# Patient Record
Sex: MALE | Race: BLACK OR AFRICAN AMERICAN | NOT HISPANIC OR LATINO | Employment: FULL TIME | URBAN - METROPOLITAN AREA
[De-identification: names, ages, dates, MRNs, and addresses within clinical notes are randomized per-mention and may not be internally consistent; named-entity substitution may affect disease eponyms.]

---

## 2018-06-17 ENCOUNTER — HOSPITAL ENCOUNTER (INPATIENT)
Facility: HOSPITAL | Age: 43
LOS: 6 days | Discharge: HOME | DRG: 247 | End: 2018-06-23
Attending: EMERGENCY MEDICINE | Admitting: INTERNAL MEDICINE
Payer: COMMERCIAL

## 2018-06-17 DIAGNOSIS — I21.02 ST ELEVATION MYOCARDIAL INFARCTION INVOLVING LEFT ANTERIOR DESCENDING (LAD) CORONARY ARTERY: ICD-10-CM

## 2018-06-17 DIAGNOSIS — I21.3 ST ELEVATION (STEMI) MYOCARDIAL INFARCTION: ICD-10-CM

## 2018-06-17 DIAGNOSIS — I21.4 NSTEMI (NON-ST ELEVATED MYOCARDIAL INFARCTION): ICD-10-CM

## 2018-06-17 DIAGNOSIS — I21.02 ST ELEVATION MYOCARDIAL INFARCTION INVOLVING LEFT ANTERIOR DESCENDING CORONARY ARTERY: ICD-10-CM

## 2018-06-17 DIAGNOSIS — I21.3 ST ELEVATION MYOCARDIAL INFARCTION (STEMI), UNSPECIFIED ARTERY: Primary | ICD-10-CM

## 2018-06-17 DIAGNOSIS — I21.3 STEMI (ST ELEVATION MYOCARDIAL INFARCTION): ICD-10-CM

## 2018-06-17 DIAGNOSIS — R07.9 CHEST PAIN: ICD-10-CM

## 2018-06-17 DIAGNOSIS — R07.89 CHEST DISCOMFORT: ICD-10-CM

## 2018-06-17 LAB
ALBUMIN SERPL BCP-MCNC: 3 G/DL
ALBUMIN SERPL BCP-MCNC: 3.4 G/DL
ALBUMIN SERPL BCP-MCNC: 3.7 G/DL
ALP SERPL-CCNC: 60 U/L
ALP SERPL-CCNC: 65 U/L
ALP SERPL-CCNC: 75 U/L
ALT SERPL W/O P-5'-P-CCNC: 21 U/L
ALT SERPL W/O P-5'-P-CCNC: 24 U/L
ALT SERPL W/O P-5'-P-CCNC: 26 U/L
ANION GAP SERPL CALC-SCNC: 12 MMOL/L
ANION GAP SERPL CALC-SCNC: 13 MMOL/L
ANION GAP SERPL CALC-SCNC: 14 MMOL/L
APTT BLDCRRT: 122.5 SEC
AST SERPL-CCNC: 46 U/L
AST SERPL-CCNC: 51 U/L
AST SERPL-CCNC: 52 U/L
BASOPHILS # BLD AUTO: 0.01 K/UL
BASOPHILS # BLD AUTO: 0.04 K/UL
BASOPHILS # BLD AUTO: 0.05 K/UL
BASOPHILS # BLD AUTO: 0.05 K/UL
BASOPHILS NFR BLD: 0.1 %
BASOPHILS NFR BLD: 0.5 %
BASOPHILS NFR BLD: 0.6 %
BASOPHILS NFR BLD: 0.6 %
BILIRUB SERPL-MCNC: 0.4 MG/DL
BILIRUB SERPL-MCNC: 0.5 MG/DL
BILIRUB SERPL-MCNC: 0.6 MG/DL
BNP SERPL-MCNC: 384 PG/ML
BUN SERPL-MCNC: 5 MG/DL
BUN SERPL-MCNC: 6 MG/DL
BUN SERPL-MCNC: 6 MG/DL
CALCIUM SERPL-MCNC: 10 MG/DL
CALCIUM SERPL-MCNC: 8.6 MG/DL
CALCIUM SERPL-MCNC: 9.4 MG/DL
CHLORIDE SERPL-SCNC: 94 MMOL/L
CHLORIDE SERPL-SCNC: 98 MMOL/L
CHLORIDE SERPL-SCNC: 99 MMOL/L
CO2 SERPL-SCNC: 22 MMOL/L
CO2 SERPL-SCNC: 23 MMOL/L
CO2 SERPL-SCNC: 25 MMOL/L
CREAT SERPL-MCNC: 0.8 MG/DL
CREAT SERPL-MCNC: 0.9 MG/DL
CREAT SERPL-MCNC: 1.2 MG/DL
D DIMER PPP IA.FEU-MCNC: 0.36 MG/L FEU
DIFFERENTIAL METHOD: ABNORMAL
EOSINOPHIL # BLD AUTO: 0.2 K/UL
EOSINOPHIL # BLD AUTO: 0.3 K/UL
EOSINOPHIL # BLD AUTO: 0.3 K/UL
EOSINOPHIL # BLD AUTO: 0.4 K/UL
EOSINOPHIL NFR BLD: 3 %
EOSINOPHIL NFR BLD: 3.3 %
EOSINOPHIL NFR BLD: 3.3 %
EOSINOPHIL NFR BLD: 4.5 %
ERYTHROCYTE [DISTWIDTH] IN BLOOD BY AUTOMATED COUNT: 12.2 %
ERYTHROCYTE [DISTWIDTH] IN BLOOD BY AUTOMATED COUNT: 12.2 %
ERYTHROCYTE [DISTWIDTH] IN BLOOD BY AUTOMATED COUNT: 12.3 %
ERYTHROCYTE [DISTWIDTH] IN BLOOD BY AUTOMATED COUNT: 12.5 %
EST. GFR  (AFRICAN AMERICAN): >60 ML/MIN/1.73 M^2
EST. GFR  (NON AFRICAN AMERICAN): >60 ML/MIN/1.73 M^2
FACT X PPP CHRO-ACNC: 0.23 IU/ML
FACT X PPP CHRO-ACNC: 0.81 IU/ML
GLUCOSE SERPL-MCNC: 360 MG/DL
GLUCOSE SERPL-MCNC: 385 MG/DL
GLUCOSE SERPL-MCNC: 478 MG/DL
HCT VFR BLD AUTO: 36.9 %
HCT VFR BLD AUTO: 36.9 %
HCT VFR BLD AUTO: 38.9 %
HCT VFR BLD AUTO: 42.2 %
HGB BLD-MCNC: 12.1 G/DL
HGB BLD-MCNC: 12.1 G/DL
HGB BLD-MCNC: 13.5 G/DL
HGB BLD-MCNC: 14.4 G/DL
IMM GRANULOCYTES # BLD AUTO: 0.04 K/UL
IMM GRANULOCYTES # BLD AUTO: 0.04 K/UL
IMM GRANULOCYTES NFR BLD AUTO: 0.5 %
IMM GRANULOCYTES NFR BLD AUTO: 0.5 %
INR PPP: 1.1
INR PPP: 1.1
LYMPHOCYTES # BLD AUTO: 2.4 K/UL
LYMPHOCYTES # BLD AUTO: 2.4 K/UL
LYMPHOCYTES # BLD AUTO: 2.6 K/UL
LYMPHOCYTES # BLD AUTO: 2.6 K/UL
LYMPHOCYTES NFR BLD: 28.2 %
LYMPHOCYTES NFR BLD: 28.2 %
LYMPHOCYTES NFR BLD: 31.9 %
LYMPHOCYTES NFR BLD: 32.2 %
MCH RBC QN AUTO: 28.1 PG
MCH RBC QN AUTO: 29 PG
MCHC RBC AUTO-ENTMCNC: 32.8 G/DL
MCHC RBC AUTO-ENTMCNC: 32.8 G/DL
MCHC RBC AUTO-ENTMCNC: 34.1 G/DL
MCHC RBC AUTO-ENTMCNC: 34.7 G/DL
MCV RBC AUTO: 81 FL
MCV RBC AUTO: 85 FL
MCV RBC AUTO: 86 FL
MCV RBC AUTO: 86 FL
MONOCYTES # BLD AUTO: 0.7 K/UL
MONOCYTES # BLD AUTO: 0.8 K/UL
MONOCYTES # BLD AUTO: 0.9 K/UL
MONOCYTES # BLD AUTO: 0.9 K/UL
MONOCYTES NFR BLD: 10.2 %
MONOCYTES NFR BLD: 10.2 %
MONOCYTES NFR BLD: 8.7 %
MONOCYTES NFR BLD: 9.2 %
NEUTROPHILS # BLD AUTO: 4.5 K/UL
NEUTROPHILS # BLD AUTO: 4.5 K/UL
NEUTROPHILS # BLD AUTO: 4.9 K/UL
NEUTROPHILS # BLD AUTO: 4.9 K/UL
NEUTROPHILS NFR BLD: 53.7 %
NEUTROPHILS NFR BLD: 56 %
NEUTROPHILS NFR BLD: 57.2 %
NEUTROPHILS NFR BLD: 57.2 %
NRBC BLD-RTO: 0 /100 WBC
NRBC BLD-RTO: 0 /100 WBC
PLATELET # BLD AUTO: 351 K/UL
PLATELET # BLD AUTO: 351 K/UL
PLATELET # BLD AUTO: 371 K/UL
PLATELET # BLD AUTO: 392 K/UL
PMV BLD AUTO: 10.1 FL
PMV BLD AUTO: 10.1 FL
PMV BLD AUTO: 10.2 FL
PMV BLD AUTO: 10.4 FL
POC ACTIVATED CLOTTING TIME K: 153 SEC (ref 74–137)
POCT GLUCOSE: 221 MG/DL (ref 70–110)
POCT GLUCOSE: 313 MG/DL (ref 70–110)
POCT GLUCOSE: 326 MG/DL (ref 70–110)
POCT GLUCOSE: 332 MG/DL (ref 70–110)
POTASSIUM SERPL-SCNC: 3.8 MMOL/L
POTASSIUM SERPL-SCNC: 4 MMOL/L
POTASSIUM SERPL-SCNC: 4 MMOL/L
PROT SERPL-MCNC: 6.4 G/DL
PROT SERPL-MCNC: 7 G/DL
PROT SERPL-MCNC: 8 G/DL
PROTHROMBIN TIME: 11 SEC
PROTHROMBIN TIME: 11.7 SEC
RBC # BLD AUTO: 4.3 M/UL
RBC # BLD AUTO: 4.3 M/UL
RBC # BLD AUTO: 4.8 M/UL
RBC # BLD AUTO: 4.97 M/UL
SAMPLE: ABNORMAL
SODIUM SERPL-SCNC: 133 MMOL/L
SODIUM SERPL-SCNC: 133 MMOL/L
SODIUM SERPL-SCNC: 134 MMOL/L
TROPONIN I SERPL DL<=0.01 NG/ML-MCNC: 4.64 NG/ML
TROPONIN I SERPL DL<=0.01 NG/ML-MCNC: 4.76 NG/ML
TROPONIN I SERPL DL<=0.01 NG/ML-MCNC: 8.74 NG/ML
WBC # BLD AUTO: 8.02 K/UL
WBC # BLD AUTO: 8.28 K/UL
WBC # BLD AUTO: 8.55 K/UL
WBC # BLD AUTO: 8.55 K/UL

## 2018-06-17 PROCEDURE — C1887 CATHETER, GUIDING: HCPCS

## 2018-06-17 PROCEDURE — 93010 ELECTROCARDIOGRAM REPORT: CPT | Mod: ,,, | Performed by: INTERNAL MEDICINE

## 2018-06-17 PROCEDURE — 84484 ASSAY OF TROPONIN QUANT: CPT | Mod: 91

## 2018-06-17 PROCEDURE — 93307 TTE W/O DOPPLER COMPLETE: CPT | Mod: 26,,, | Performed by: INTERNAL MEDICINE

## 2018-06-17 PROCEDURE — C8923 2D TTE W OR W/O FOL W/CON,CO: HCPCS

## 2018-06-17 PROCEDURE — 63600175 PHARM REV CODE 636 W HCPCS

## 2018-06-17 PROCEDURE — 25000003 PHARM REV CODE 250: Performed by: STUDENT IN AN ORGANIZED HEALTH CARE EDUCATION/TRAINING PROGRAM

## 2018-06-17 PROCEDURE — 99900035 HC TECH TIME PER 15 MIN (STAT)

## 2018-06-17 PROCEDURE — 25000003 PHARM REV CODE 250: Performed by: INTERNAL MEDICINE

## 2018-06-17 PROCEDURE — 85379 FIBRIN DEGRADATION QUANT: CPT

## 2018-06-17 PROCEDURE — 85610 PROTHROMBIN TIME: CPT

## 2018-06-17 PROCEDURE — 93005 ELECTROCARDIOGRAM TRACING: CPT

## 2018-06-17 PROCEDURE — 83880 ASSAY OF NATRIURETIC PEPTIDE: CPT

## 2018-06-17 PROCEDURE — 99152 MOD SED SAME PHYS/QHP 5/>YRS: CPT | Mod: ,,, | Performed by: INTERNAL MEDICINE

## 2018-06-17 PROCEDURE — 25500020 PHARM REV CODE 255

## 2018-06-17 PROCEDURE — 27000221 HC OXYGEN, UP TO 24 HOURS

## 2018-06-17 PROCEDURE — 85025 COMPLETE CBC W/AUTO DIFF WBC: CPT | Mod: 91

## 2018-06-17 PROCEDURE — 85025 COMPLETE CBC W/AUTO DIFF WBC: CPT

## 2018-06-17 PROCEDURE — 27000014 CATH LAB PROCEDURE

## 2018-06-17 PROCEDURE — 85730 THROMBOPLASTIN TIME PARTIAL: CPT

## 2018-06-17 PROCEDURE — 25000003 PHARM REV CODE 250: Performed by: EMERGENCY MEDICINE

## 2018-06-17 PROCEDURE — 85610 PROTHROMBIN TIME: CPT | Mod: 91

## 2018-06-17 PROCEDURE — 80053 COMPREHEN METABOLIC PANEL: CPT | Mod: 91

## 2018-06-17 PROCEDURE — 94761 N-INVAS EAR/PLS OXIMETRY MLT: CPT

## 2018-06-17 PROCEDURE — B2111ZZ FLUOROSCOPY OF MULTIPLE CORONARY ARTERIES USING LOW OSMOLAR CONTRAST: ICD-10-PCS | Performed by: INTERNAL MEDICINE

## 2018-06-17 PROCEDURE — 85520 HEPARIN ASSAY: CPT | Mod: 91

## 2018-06-17 PROCEDURE — 99291 CRITICAL CARE FIRST HOUR: CPT | Mod: 25

## 2018-06-17 PROCEDURE — 85520 HEPARIN ASSAY: CPT

## 2018-06-17 PROCEDURE — 99255 IP/OBS CONSLTJ NEW/EST HI 80: CPT | Mod: 25,,, | Performed by: INTERNAL MEDICINE

## 2018-06-17 PROCEDURE — 93454 CORONARY ARTERY ANGIO S&I: CPT | Mod: 26,,, | Performed by: INTERNAL MEDICINE

## 2018-06-17 PROCEDURE — 84484 ASSAY OF TROPONIN QUANT: CPT

## 2018-06-17 PROCEDURE — 20000000 HC ICU ROOM

## 2018-06-17 PROCEDURE — 80053 COMPREHEN METABOLIC PANEL: CPT

## 2018-06-17 PROCEDURE — 25000003 PHARM REV CODE 250

## 2018-06-17 PROCEDURE — 85347 COAGULATION TIME ACTIVATED: CPT

## 2018-06-17 PROCEDURE — 96374 THER/PROPH/DIAG INJ IV PUSH: CPT

## 2018-06-17 PROCEDURE — 63600175 PHARM REV CODE 636 W HCPCS: Performed by: INTERNAL MEDICINE

## 2018-06-17 PROCEDURE — 63600175 PHARM REV CODE 636 W HCPCS: Performed by: EMERGENCY MEDICINE

## 2018-06-17 RX ORDER — METFORMIN HYDROCHLORIDE 500 MG/1
1000 TABLET ORAL 2 TIMES DAILY WITH MEALS
Status: ON HOLD | COMMUNITY
End: 2018-06-23 | Stop reason: HOSPADM

## 2018-06-17 RX ORDER — ASPIRIN 81 MG/1
81 TABLET ORAL DAILY
COMMUNITY

## 2018-06-17 RX ORDER — LORAZEPAM 2 MG/ML
0.5 INJECTION INTRAMUSCULAR EVERY 6 HOURS PRN
Status: DISCONTINUED | OUTPATIENT
Start: 2018-06-17 | End: 2018-06-17

## 2018-06-17 RX ORDER — SODIUM CHLORIDE 9 MG/ML
INJECTION, SOLUTION INTRAVENOUS CONTINUOUS
Status: DISCONTINUED | OUTPATIENT
Start: 2018-06-17 | End: 2018-06-20

## 2018-06-17 RX ORDER — INSULIN ASPART 100 [IU]/ML
0-5 INJECTION, SOLUTION INTRAVENOUS; SUBCUTANEOUS EVERY 6 HOURS PRN
Status: DISCONTINUED | OUTPATIENT
Start: 2018-06-17 | End: 2018-06-18

## 2018-06-17 RX ORDER — INSULIN ASPART 100 [IU]/ML
INJECTION, SUSPENSION SUBCUTANEOUS
Status: ON HOLD | COMMUNITY
End: 2018-06-23 | Stop reason: HOSPADM

## 2018-06-17 RX ORDER — NITROGLYCERIN 0.4 MG/1
0.4 TABLET SUBLINGUAL
Status: DISCONTINUED | OUTPATIENT
Start: 2018-06-17 | End: 2018-06-23 | Stop reason: HOSPADM

## 2018-06-17 RX ORDER — SODIUM CHLORIDE 9 MG/ML
1000 INJECTION, SOLUTION INTRAVENOUS
Status: COMPLETED | OUTPATIENT
Start: 2018-06-17 | End: 2018-06-17

## 2018-06-17 RX ORDER — LISINOPRIL 2.5 MG/1
2.5 TABLET ORAL DAILY
Status: ON HOLD | COMMUNITY
End: 2018-06-23 | Stop reason: HOSPADM

## 2018-06-17 RX ORDER — MORPHINE SULFATE 10 MG/ML
4 INJECTION INTRAMUSCULAR; INTRAVENOUS; SUBCUTANEOUS
Status: DISCONTINUED | OUTPATIENT
Start: 2018-06-17 | End: 2018-06-17

## 2018-06-17 RX ORDER — HEPARIN SODIUM 10000 [USP'U]/100ML
15 INJECTION, SOLUTION INTRAVENOUS CONTINUOUS
Status: DISCONTINUED | OUTPATIENT
Start: 2018-06-17 | End: 2018-06-17

## 2018-06-17 RX ORDER — ASPIRIN 325 MG
325 TABLET ORAL
Status: COMPLETED | OUTPATIENT
Start: 2018-06-17 | End: 2018-06-17

## 2018-06-17 RX ORDER — MORPHINE SULFATE 10 MG/ML
2 INJECTION INTRAMUSCULAR; INTRAVENOUS; SUBCUTANEOUS EVERY 4 HOURS PRN
Status: DISCONTINUED | OUTPATIENT
Start: 2018-06-17 | End: 2018-06-23 | Stop reason: HOSPADM

## 2018-06-17 RX ORDER — HEPARIN SODIUM 10000 [USP'U]/100ML
5000 INJECTION, SOLUTION INTRAVENOUS CONTINUOUS
Status: DISCONTINUED | OUTPATIENT
Start: 2018-06-17 | End: 2018-06-17

## 2018-06-17 RX ORDER — GLUCAGON 1 MG
1 KIT INJECTION
Status: DISCONTINUED | OUTPATIENT
Start: 2018-06-17 | End: 2018-06-18

## 2018-06-17 RX ORDER — HEPARIN SODIUM 10000 [USP'U]/100ML
21 INJECTION, SOLUTION INTRAVENOUS CONTINUOUS
Status: DISCONTINUED | OUTPATIENT
Start: 2018-06-17 | End: 2018-06-22

## 2018-06-17 RX ORDER — NAPROXEN SODIUM 220 MG/1
81 TABLET, FILM COATED ORAL DAILY
Status: DISCONTINUED | OUTPATIENT
Start: 2018-06-18 | End: 2018-06-23 | Stop reason: HOSPADM

## 2018-06-17 RX ORDER — ATORVASTATIN CALCIUM 20 MG/1
80 TABLET, FILM COATED ORAL NIGHTLY
Status: DISCONTINUED | OUTPATIENT
Start: 2018-06-17 | End: 2018-06-23 | Stop reason: HOSPADM

## 2018-06-17 RX ORDER — ATORVASTATIN CALCIUM 20 MG/1
80 TABLET, FILM COATED ORAL DAILY
Status: DISCONTINUED | OUTPATIENT
Start: 2018-06-18 | End: 2018-06-17

## 2018-06-17 RX ORDER — INSULIN GLARGINE 100 [IU]/ML
INJECTION, SOLUTION SUBCUTANEOUS
Status: ON HOLD | COMMUNITY
End: 2018-06-23 | Stop reason: HOSPADM

## 2018-06-17 RX ORDER — SODIUM CHLORIDE 0.9 % (FLUSH) 0.9 %
3 SYRINGE (ML) INJECTION
Status: DISCONTINUED | OUTPATIENT
Start: 2018-06-17 | End: 2018-06-23 | Stop reason: HOSPADM

## 2018-06-17 RX ADMIN — NITROGLYCERIN 0.4 MG: 0.4 TABLET SUBLINGUAL at 01:06

## 2018-06-17 RX ADMIN — INSULIN ASPART 2 UNITS: 100 INJECTION, SOLUTION INTRAVENOUS; SUBCUTANEOUS at 11:06

## 2018-06-17 RX ADMIN — HEPARIN SODIUM AND DEXTROSE 15 UNITS/KG/HR: 10000; 5 INJECTION INTRAVENOUS at 07:06

## 2018-06-17 RX ADMIN — SODIUM CHLORIDE 1000 ML: 0.9 INJECTION, SOLUTION INTRAVENOUS at 01:06

## 2018-06-17 RX ADMIN — SODIUM CHLORIDE: 0.9 INJECTION, SOLUTION INTRAVENOUS at 09:06

## 2018-06-17 RX ADMIN — ASPIRIN 325 MG ORAL TABLET 325 MG: 325 PILL ORAL at 01:06

## 2018-06-17 RX ADMIN — MORPHINE SULFATE 4 MG: 10 INJECTION INTRAVENOUS at 01:06

## 2018-06-17 RX ADMIN — LORAZEPAM 0.5 MG: 2 INJECTION INTRAMUSCULAR; INTRAVENOUS at 03:06

## 2018-06-17 RX ADMIN — INSULIN ASPART 4 UNITS: 100 INJECTION, SOLUTION INTRAVENOUS; SUBCUTANEOUS at 04:06

## 2018-06-17 RX ADMIN — ATORVASTATIN CALCIUM 80 MG: 20 TABLET, FILM COATED ORAL at 11:06

## 2018-06-17 RX ADMIN — HEPARIN SODIUM AND DEXTROSE 15 UNITS/KG/HR: 10000; 5 INJECTION INTRAVENOUS at 03:06

## 2018-06-17 RX ADMIN — SODIUM CHLORIDE: 0.9 INJECTION, SOLUTION INTRAVENOUS at 03:06

## 2018-06-17 NOTE — ED TRIAGE NOTES
Pt. Ambulates to room with complaints of intermittent chest pain for the past 1 week. Pt. States that chest pain worsen while at [employer] today, and describe it as sharp pain to his left chest wall that radiates to his left shoulder and left arm. Pt. States that he thought he had gas discomfort and took  some GAS X to no relieve.Pt. States he had a negative stress test done in 2005.

## 2018-06-17 NOTE — ED PROVIDER NOTES
Encounter Date: 6/17/2018       History     Chief Complaint   Patient presents with    Chest Pain     States he feels pressure in his chest x 1 week.  States it hasn't gotten better     HPI   42-year-old gentleman with a past medical history of hypertension, diabetes presents with mild to moderate midsternal chest discomfort that radiates to the left arm.  It occurs intermittently ×1 week.  He initially noted it when he was walking.  But it now intermittently has the discomfort while even when he is eating.  Currently occurred today at [employer] and that is what concerned him enough to come into the emergency department.  Denies nausea, vomiting, shortness of breath, lightheadedness.    Review of patient's allergies indicates:  No Known Allergies  Past Medical History:   Diagnosis Date    Diabetes mellitus     Hypertension      Past Surgical History:   Procedure Laterality Date    HERNIA REPAIR       History reviewed. No pertinent family history.  Social History   Substance Use Topics    Smoking status: Never Smoker    Smokeless tobacco: Current User     Types: Snuff    Alcohol use No      Comment: occasion    chews tobacco  Review of Systems  ROS per history of present illness  Constitutional: Negative for activity change, appetite change, diaphoresis and unexpected weight change.   HENT: Negative for dental problem, drooling, ear pain and hearing loss.    Eyes: Negative for pain, discharge, redness and itching.   Musculoskeletal: Negative for arthralgias, gait problem, joint swelling and neck stiffness.   Skin: Negative for color change, pallor, rash and wound.   Allergic/Immunologic: Negative for environmental allergies, food allergies and immunocompromised state.   Neurological: Negative for tremors, seizures, facial asymmetry and speech difficulty.   Hematological: Negative for adenopathy. Does not bruise/bleed easily.   Psychiatric/Behavioral: Negative for agitation and dysphoric mood.   All other systems  "reviewed and are negative.      Physical Exam     Initial Vitals [06/17/18 1306]   BP Pulse Resp Temp SpO2   133/78 104 16 98.3 °F (36.8 °C) 97 %      MAP       --         Physical Exam  ED Triage Vitals [06/17/18 1306]   Enc Vitals Group      /78      Pulse 104      Resp 16      Temp 98.3 °F (36.8 °C)      Temp src Oral      SpO2 97 %      Weight 160 lb      Height 5' 7"      Head Circumference       Peak Flow       Pain Score       Pain Loc       Pain Edu?       Excl. in GC?        Vital signs and nursing assessment noted: tachycardia, relatively normal BP    GEN:   Mild distress, A & Ox3, atraumatic, well appearing, nontoxic appearing  HEENT:  PERRLA, EOMI, moist membranes, nl conjunctiva, no scleral icterus, no nystagmus, no nodes/nodules, soft, supple, FROM, no tracheal deviation, nexus negative  CV:   Tachycardia no m/r/g, 2+ radial pulses, <2sec cap refill, no obvious JVD  RESP:  CTA B, no w/r/r, equal and bilateral chest rise, no respiratory distress  ABD:   soft, Nontender, Nondistended, +BS, no guarding/rebound  BACK:  FROM, no midline tenderness, no paraspinal tenderness  :   Deferred  EXT:   FROM, STALEY x 4, no edema, no calf tenderness, no swelling  LYMPH:  no gross adenopathy  NEURO:  CN II-XII grossly intact, no obvious motor/sensory deficit, no tremor, negative Romberg,  nl gait/coordination  PSYCH:   no SI/HI, no anxiety, nl mood/affect, nl judgement/thought process  SKIN:  Warm, dry, intact, no rashes/lesions or masses, nl color, no pallor    ED Course   Procedures  Labs Reviewed   CBC W/ AUTO DIFFERENTIAL - Abnormal; Notable for the following:        Result Value    Platelets 392 (*)     All other components within normal limits   COMPREHENSIVE METABOLIC PANEL - Abnormal; Notable for the following:     Sodium 133 (*)     Chloride 94 (*)     Glucose 478 (*)     AST 52 (*)     All other components within normal limits    Narrative:        GLUCOSE critical result(s) called and verbal readback " obtained   from OUMAR ADEN. , 2018 14:16   TROPONIN I - Abnormal; Notable for the following:     Troponin I 4.644 (*)     All other components within normal limits   B-TYPE NATRIURETIC PEPTIDE - Abnormal; Notable for the following:      (*)     All other components within normal limits   POCT GLUCOSE - Abnormal; Notable for the following:     POCT Glucose 221 (*)     All other components within normal limits   D DIMER, QUANTITATIVE   POCT GLUCOSE MONITORING CONTINUOUS          X-Ray Chest PA And Lateral    (Results Pending)                   MEDICAL DECISION MAKINM PMHx hypertension, diabetes presents with midsternal chest pain on and off ×1 week.  Exam shows mild distress with tachycardia.    Chest pain differential includes but not exclusive to myocardial infarction, pulmonary embolism, aortic dissection, costochondritis, GERD, pneumonia, malingering    Treatment plan includes physical exam, cardiac monitoring, labs, imaging studies,  and supportive care.  Labs and imaging studies reviewed and independently interpreted:         ED Course as of  1502   Sun 2018   1305   +STEMI v2-v4 (possible v1) with TWI I, AvL  NSR with HR   Nl conduction, nl intervals  Evidence of LVH and repolarization  Qwave in inferior leads  +PVCs  Nl axis  No EKG to compare to.   EKG 12-lead [NO]   1310 Cardiology consult:  [NO]   1316 Per cardiology: Give ASA. Hold plavix and heparin. Is going to cath lab ASAP.  [NO]   1352 elevated POCT Glucose: (!) 221 [NO]   1352 Unremarkable WBC: 8.02 [NO]   1352 Unremarkable Hemoglobin: 14.4 [NO]   1353 Aspirin, glycerin, morphine, IV fluids ordered.  [NO]   1353 Unremarkable D-Dimer: 0.36 [NO]      ED Course User Index  [NO] Tasia Rowland MD    elevated troponin, elevated BNP  Critical care was necessary to treat or prevent imminent or life-threatening deterioration.   Critical care time: 31 min  Time spent at the bedside, reviewing test results, discussing  the case with staff and/or consult(s), documenting the medical record and time spent with family members discussing treatment and management decisions.    The time involved in the performance of separately reportable procedures was not counted toward critical care time.      Patient improved after treatment .    Family and patient updated on care.  Pt agrees with assessment, disposition and treatment plan and has no further questions or complaints at this time. Given return precautions and demonstrates understanding.    Reason for inpatient admission: heart cath, serial enzymes, cardiac monitoring. Further plan per inpatient team. Further medical treatment such as ACE, beta blockade, antiplatelet therapy per consult and inpatient team    Somewhat atypical and possibly related to subendocardial with hypertension  Patient has mild troponin elevation without any new EKG changes  Currently chest pain-free with advanced age and history of noncompliance and poor understanding would currently pursue conservative treatment      Clinical Impression:   The primary encounter diagnosis was ST elevation myocardial infarction (STEMI), unspecified artery. Diagnoses of Chest discomfort and Chest pain were also pertinent to this visit.      Disposition:   Disposition: Admitted  Condition: Atilio Rowland MD  06/17/18 7611

## 2018-06-17 NOTE — H&P
Chief Complaint   Patient presents with    Chest Pain       States he feels pressure in his chest x 1 week.  States it hasn't gotten better      HPI   42-year-old gentleman with a past medical history of hypertension, diabetes presents with mild to moderate midsternal chest discomfort that radiates to the left arm.  It occurs intermittently ×1 week.  He initially noted it when he was walking.  But it now intermittently has the discomfort while even when he is eating.  Currently occurred today at [employer] and that is what concerned him enough to come into the emergency department.  Denies nausea, vomiting, shortness of breath, lightheadedness.     Review of patient's allergies indicates:  No Known Allergies       Past Medical History:   Diagnosis Date    Diabetes mellitus      Hypertension              Past Surgical History:   Procedure Laterality Date    HERNIA REPAIR          History reviewed. No pertinent family history.         Social History   Substance Use Topics    Smoking status: Never Smoker    Smokeless tobacco: Current User       Types: Snuff    Alcohol use No         Comment: occasion    chews tobacco  Review of Systems  ROS per history of present illness  Constitutional: Negative for activity change, appetite change, diaphoresis and unexpected weight change.   HENT: Negative for dental problem, drooling, ear pain and hearing loss.    Eyes: Negative for pain, discharge, redness and itching.   Musculoskeletal: Negative for arthralgias, gait problem, joint swelling and neck stiffness.   Skin: Negative for color change, pallor, rash and wound.   Allergic/Immunologic: Negative for environmental allergies, food allergies and immunocompromised state.   Neurological: Negative for tremors, seizures, facial asymmetry and speech difficulty.   Hematological: Negative for adenopathy. Does not bruise/bleed easily.   Psychiatric/Behavioral: Negative for agitation and dysphoric mood.   All other systems  "reviewed and are negative.        Physical Exam             Initial Vitals [06/17/18 1306]   BP Pulse Resp Temp SpO2   133/78 104 16 98.3 °F (36.8 °C) 97 %       MAP           --              Physical Exam      ED Triage Vitals [06/17/18 1306]   Enc Vitals Group      /78      Pulse 104      Resp 16      Temp 98.3 °F (36.8 °C)      Temp src Oral      SpO2 97 %      Weight 160 lb      Height 5' 7"      Head Circumference        Peak Flow        Pain Score        Pain Loc        Pain Edu?        Excl. in GC?           Vital signs and nursing assessment noted: tachycardia, relatively normal BP     GEN:               Mild distress, A & Ox3, atraumatic, well appearing, nontoxic appearing  HEENT:           PERRLA, EOMI, moist membranes, nl conjunctiva, no scleral icterus, no nystagmus, no nodes/nodules, soft, supple, FROM, no tracheal deviation, nexus negative  CV:                  Tachycardia no m/r/g, 2+ radial pulses, <2sec cap refill, no obvious JVD  RESP:             CTA B, no w/r/r, equal and bilateral chest rise, no respiratory distress  ABD:                soft, Nontender, Nondistended, +BS, no guarding/rebound  BACK:             FROM, no midline tenderness, no paraspinal tenderness  :                  Deferred  EXT:                FROM, STALEY x 4, no edema, no calf tenderness, no swelling  LYMPH:           no gross adenopathy  NEURO:          CN II-XII grossly intact, no obvious motor/sensory deficit, no tremor, negative Romberg,  nl gait/coordination  PSYCH:           no SI/HI, no anxiety, nl mood/affect, nl judgement/thought process  SKIN:               Warm, dry, intact, no rashes/lesions or masses, nl color, no pallor     ED Course   Procedures        Labs Reviewed   CBC W/ AUTO DIFFERENTIAL - Abnormal; Notable for the following:        Result Value      Platelets 392 (*)       All other components within normal limits   COMPREHENSIVE METABOLIC PANEL - Abnormal; Notable for the following:      Sodium 133 " (*)       Chloride 94 (*)       Glucose 478 (*)       AST 52 (*)       All other components within normal limits     Narrative:         GLUCOSE critical result(s) called and verbal readback obtained   from OUMAR ADEN. , 06/17/2018 14:16   TROPONIN I - Abnormal; Notable for the following:      Troponin I 4.644 (*)       All other components within normal limits   B-TYPE NATRIURETIC PEPTIDE - Abnormal; Notable for the following:       (*)       All other components within normal limits   POCT GLUCOSE - Abnormal; Notable for the following:      POCT Glucose 221 (*)       All other components within normal limits   D DIMER, QUANTITATIVE   POCT GLUCOSE MONITORING CONTINUOUS          Clinical Impression:   The primary encounter diagnosis was ST elevation myocardial infarction (STEMI), unspecified artery.      Cardiac Cath:  Date of Procedure:  06/17/2018    A. Indication/Pre-Operative Diagnosis     The patient is a 42 year old male that was referred for emergency catheterization by Dr. Karel Patten for ACS (STEMI) and SFA/PFA saddle embolus..     B. Summary/Post-Operative Diagnosis       Three vessel coronary artery disease.    Saddle thrombus of right SFA/PFA bifurcation with normal distal flow.    Suspected LV Apical Thrombus, no LV gram performed, ECHO not done yet.    C. HPI     I have reviewed the history and physical completed on 06/17/2018. The patient has been examined and I concur with the findings from 06/17/2018.     Patient history was obtained from the patient.     Height: 67 in.      Weight: 160 lbs.      BMI: 25.10 kg/m2    Laboratory data revealed:     06/17/2018 CREAT  1.2, GLU  478, HCT  42.2, HGB  14.4, K  4.0, NA  133, PLT  392, WBCIR  8.02, BUN  6            ACS Enzymes:     06/17/2018 TROP  4.644              D. Angiographic Results     Diagnostic:        - Left Main Coronary Artery:             The LM has luminal irregularities. There is RAF 3 flow.     - Left Anterior Descending  Artery:             The proximal LAD is occluded (100). There is RAF 0 flow. Collateral filling from right coronary artery.     - Left Circumflex Artery:             The LCX has a 90% stenosis. There is RAF 3 flow.     - Right Coronary Artery:             The RCA has a 90% stenosis. There is RAF 3 flow.     - Common Femoral Artery:             The right CFA is normal.     - Superficial Femoral Artery:             The right proximal SFA has a 70% stenosis.                     Lesion Details:   There is severe thrombus. Saddle thrombus.     - Profunda Femoral Artery:             The right proximal PFA has a 70% stenosis.                     Lesion Details:   There is severe thrombus. Saddle Thrombus    E. Details of Procedure     PROCEDURES PERFORMED: Placement of Closure Device and Coronary Angio    ANESTHESIA: Conscious sedation was achieved with 50 mcg of FENTANYL and 1 mcg of MIDAZOLAM (VERSED) 1MG/ML. Local anesthesia was achieved with 20 ml of LIDOCAINE 1% 20ML . Moderate conscious sedation was performed and cardiorespiratory functions were   monitored the entire procedure by Kylie Trejo RN. Sedation began at 02:05 PM and concluded at 02:21 PM, totalling 15.9 minutes.     PRIMARY SURGEON: Hong Rodriguez MD    COMPLICATIONS: There were no complications.    Medications given on sterile field: Lidocaine 1% 20ml (20 ml).    Medications given during procedure: Heparin Bag 1000 Units/500ml, Midazolam (versed) 1mg/ml (1 mcg), Fentanyl (50 mcg), Heparin 1000 Units/ml (5000 units) and Heparin 20366 Units/250m (units/hr) (5000 ml/hr).    The patient was brought to the catheterization laboratory. The Acist Kit Syr Reusable was flushed and connected to contrast. The Acist Kit Hand Control High Pressur was flushed and connected to contrast. The Acist Kit Manifold Low Press Tubing was   flushed and connected to contrast. The Pads Defib / Rts were applied to chest. Right femoral artery prepped and draped. After  local anesthesia, a Sheath 6f 11cm was inserted into the right femoral artery. Angiography performed to evaluate for closure   device in the right femoral artery. A Wire Bmw J  .014 X 190 Cm was inserted into the Aorta. A Cath Dxterity 6fr Jl4 was inserted into the ostial LM. The Wire Bmw J  .014 X 190 Cm was removed. Angiography performed in multiple views in the left coronary   arteries. The Cath Dxterity 6fr Jl4 was removed. A Cath 6fr Jr4.0 was inserted into the ostial RCA. Angiography performed in multiple views in the right coronary arteries. The Cath 6fr Jr4.0 was removed. The Sheath 6f 11cm was removed. A Perclose   Proglide 6fr was inserted into the right femoral artery. A Perclose Proglide 6fr was deployed into the right femoral artery. The Tegaderm 4x4 was applied to right groin. Total Contrast Omni 350/ 150ml injected was 66.0 ml. Total Contrast Omni 350/ 150ml   used was 150.0 ml.       Fluoroscopy Time 1.1 minutes   Radiation Dose 606.5 mGy   Contrast Injected 66 ml Contrast Omni 350/ 150ml   Contrast Used  150 ml Contrast Omni 350/ 150ml            Procedure log documented by Laura Alarcon RN and verified by Hong Rodriguez MD    ESTIMATED BLOOD LOSS is < 50 cc.    SPECIMEN: No specimen.     F. Recommendations     1. Routine post-cath care.  2. Maximize medical management.  3. ASA 81mg.  4. Statin therapy.  5. Heparin Drip for ACS and for SFA/PFA thrombus.   6. ECHO.  7. Transfer to [facility].   8. Discussed with Dr. Saravia who will accept patient in transfer to the CCU team.   9. Discussed with Dr. Conner who will consult on patient for CABG as option 1. If not surgical candidate, consider LAD angioplasty with staged Cx and RCA angioplasty as option 2.                Disposition:   Results discussed with patient and family and patient transferred to [facility] for higher level of care.

## 2018-06-17 NOTE — ED NOTES
Pt. tranpsorted to floor via stretcher on portable monitors along with [place] monitors, on 2L oxygen with  Fluids infusing and  Emily RN to assist. pt. Belongings and Family at bedside. Pt is aaox4.

## 2018-06-17 NOTE — NURSING
1500-pt arrived to unit, bedside report received from Iraida. Placed on continuous cardiac monitoring. Denies chest pain and SOB, on 2 L nasal cannula. Told in handoff MD wanted pt on 5,000 units of heparin continuous.  Pt received with 25,000 unit in 250 cc concentration heparin infusing @ 50 cc/hr per Cath lab RN. No order noted. I called Cath lab called back verify order and verbal from Ingrid relayed from Dr. Patten to continue heparin at current rate for next 6 hours. After this conversation, I spoke with Pharmacy and order re-verified with Dr. Patten he would like gtt going at 15 units/kg/hr and only wanted a 5,000 unit bolus.  Notified of error. Stat labs ordered.  Will obtain ACT and pause heparin if > 400. Verified additional orders w/ MD. Per Dr. Patten cardiothoracic surgery to see pt tomorrow am, cardiac diet ordered. Notified MD glucose > 400 on admit, sliding scale ordered. IVF's started as well.   Pt tearful and very nervous, plan of care explained, notified of impending transfer to [facility]. Reassurance and support provided. Family at bedside updated.  1630-Labs results reviewed.  1700- Awaited control for ACT per RT, POCT ACT = 143 done @ bedside.

## 2018-06-17 NOTE — PLAN OF CARE
Problem: Patient Care Overview  Goal: Plan of Care Review  Outcome: Ongoing (interventions implemented as appropriate)  Pt received from cath lab, VSS/afebrile/ 2 L NC.  Received from cath lab with heparin gtt infusing (see RN note). Pt denies CP and SOB. Awaiting bed assignment from [facility]. Ok for cardiac diet per MD, able to void per urinal. R groin dressing CDI, without redness or hematoma. Safety and skin integrity maintained with precautions in place.

## 2018-06-18 PROBLEM — I10 HTN (HYPERTENSION): Status: ACTIVE | Noted: 2018-06-18

## 2018-06-18 PROBLEM — I21.3 STEMI (ST ELEVATION MYOCARDIAL INFARCTION): Status: ACTIVE | Noted: 2018-06-17

## 2018-06-18 PROBLEM — I21.3 STEMI (ST ELEVATION MYOCARDIAL INFARCTION): Status: ACTIVE | Noted: 2018-06-18

## 2018-06-18 LAB
ALBUMIN SERPL BCP-MCNC: 2.8 G/DL
ALP SERPL-CCNC: 64 U/L
ALT SERPL W/O P-5'-P-CCNC: 18 U/L
ANION GAP SERPL CALC-SCNC: 8 MMOL/L
AST SERPL-CCNC: 42 U/L
BASOPHILS # BLD AUTO: 0.03 K/UL
BASOPHILS NFR BLD: 0.4 %
BILIRUB SERPL-MCNC: 0.7 MG/DL
BUN SERPL-MCNC: 4 MG/DL
CALCIUM SERPL-MCNC: 8.6 MG/DL
CHLORIDE SERPL-SCNC: 102 MMOL/L
CO2 SERPL-SCNC: 25 MMOL/L
CORONARY STENOSIS: ABNORMAL
CREAT SERPL-MCNC: 0.6 MG/DL
DIFFERENTIAL METHOD: ABNORMAL
EOSINOPHIL # BLD AUTO: 0.2 K/UL
EOSINOPHIL NFR BLD: 2.7 %
ERYTHROCYTE [DISTWIDTH] IN BLOOD BY AUTOMATED COUNT: 12.3 %
EST. GFR  (AFRICAN AMERICAN): >60 ML/MIN/1.73 M^2
EST. GFR  (NON AFRICAN AMERICAN): >60 ML/MIN/1.73 M^2
ESTIMATED AVG GLUCOSE: 321 MG/DL
FACT X PPP CHRO-ACNC: 0.33 IU/ML
GLUCOSE SERPL-MCNC: 271 MG/DL
HBA1C MFR BLD HPLC: 12.8 %
HCT VFR BLD AUTO: 36 %
HGB BLD-MCNC: 12.1 G/DL
IMM GRANULOCYTES # BLD AUTO: 0.02 K/UL
IMM GRANULOCYTES NFR BLD AUTO: 0.3 %
LYMPHOCYTES # BLD AUTO: 2.1 K/UL
LYMPHOCYTES NFR BLD: 27.1 %
MAGNESIUM SERPL-MCNC: 1.6 MG/DL
MCH RBC QN AUTO: 28.4 PG
MCHC RBC AUTO-ENTMCNC: 33.6 G/DL
MCV RBC AUTO: 85 FL
MONOCYTES # BLD AUTO: 0.9 K/UL
MONOCYTES NFR BLD: 11.5 %
NEUTROPHILS # BLD AUTO: 4.6 K/UL
NEUTROPHILS NFR BLD: 58 %
NRBC BLD-RTO: 0 /100 WBC
PERIPHERAL STENOSIS: ABNORMAL
PHOSPHATE SERPL-MCNC: 2.5 MG/DL
PLATELET # BLD AUTO: 330 K/UL
PMV BLD AUTO: 10.5 FL
POCT GLUCOSE: 204 MG/DL (ref 70–110)
POCT GLUCOSE: 253 MG/DL (ref 70–110)
POCT GLUCOSE: 260 MG/DL (ref 70–110)
POCT GLUCOSE: 263 MG/DL (ref 70–110)
POCT GLUCOSE: 273 MG/DL (ref 70–110)
POTASSIUM SERPL-SCNC: 4 MMOL/L
PROT SERPL-MCNC: 6.2 G/DL
RBC # BLD AUTO: 4.26 M/UL
SODIUM SERPL-SCNC: 135 MMOL/L
TROPONIN I SERPL DL<=0.01 NG/ML-MCNC: 6.93 NG/ML
TROPONIN I SERPL DL<=0.01 NG/ML-MCNC: 7.6 NG/ML
TROPONIN I SERPL DL<=0.01 NG/ML-MCNC: 8.39 NG/ML
WBC # BLD AUTO: 7.9 K/UL

## 2018-06-18 PROCEDURE — 85025 COMPLETE CBC W/AUTO DIFF WBC: CPT

## 2018-06-18 PROCEDURE — 84100 ASSAY OF PHOSPHORUS: CPT

## 2018-06-18 PROCEDURE — 63600175 PHARM REV CODE 636 W HCPCS: Performed by: INTERNAL MEDICINE

## 2018-06-18 PROCEDURE — 80053 COMPREHEN METABOLIC PANEL: CPT

## 2018-06-18 PROCEDURE — 63600175 PHARM REV CODE 636 W HCPCS: Performed by: STUDENT IN AN ORGANIZED HEALTH CARE EDUCATION/TRAINING PROGRAM

## 2018-06-18 PROCEDURE — 94761 N-INVAS EAR/PLS OXIMETRY MLT: CPT

## 2018-06-18 PROCEDURE — 93306 TTE W/DOPPLER COMPLETE: CPT

## 2018-06-18 PROCEDURE — 83735 ASSAY OF MAGNESIUM: CPT

## 2018-06-18 PROCEDURE — 20000000 HC ICU ROOM

## 2018-06-18 PROCEDURE — 25000003 PHARM REV CODE 250: Performed by: STUDENT IN AN ORGANIZED HEALTH CARE EDUCATION/TRAINING PROGRAM

## 2018-06-18 PROCEDURE — 85520 HEPARIN ASSAY: CPT

## 2018-06-18 PROCEDURE — 99223 1ST HOSP IP/OBS HIGH 75: CPT | Mod: ,,, | Performed by: INTERNAL MEDICINE

## 2018-06-18 PROCEDURE — 93306 TTE W/DOPPLER COMPLETE: CPT | Mod: 26,,, | Performed by: INTERNAL MEDICINE

## 2018-06-18 PROCEDURE — 84484 ASSAY OF TROPONIN QUANT: CPT

## 2018-06-18 PROCEDURE — 83036 HEMOGLOBIN GLYCOSYLATED A1C: CPT

## 2018-06-18 RX ORDER — MAGNESIUM SULFATE HEPTAHYDRATE 40 MG/ML
2 INJECTION, SOLUTION INTRAVENOUS ONCE
Status: COMPLETED | OUTPATIENT
Start: 2018-06-18 | End: 2018-06-18

## 2018-06-18 RX ORDER — INSULIN ASPART 100 [IU]/ML
0-5 INJECTION, SOLUTION INTRAVENOUS; SUBCUTANEOUS
Status: DISCONTINUED | OUTPATIENT
Start: 2018-06-18 | End: 2018-06-20

## 2018-06-18 RX ORDER — LISINOPRIL 2.5 MG/1
2.5 TABLET ORAL DAILY
Status: DISCONTINUED | OUTPATIENT
Start: 2018-06-18 | End: 2018-06-20

## 2018-06-18 RX ORDER — IBUPROFEN 200 MG
24 TABLET ORAL
Status: DISCONTINUED | OUTPATIENT
Start: 2018-06-18 | End: 2018-06-20

## 2018-06-18 RX ORDER — INSULIN ASPART 100 [IU]/ML
1-10 INJECTION, SOLUTION INTRAVENOUS; SUBCUTANEOUS
Status: DISCONTINUED | OUTPATIENT
Start: 2018-06-18 | End: 2018-06-18

## 2018-06-18 RX ORDER — GLUCAGON 1 MG
1 KIT INJECTION
Status: DISCONTINUED | OUTPATIENT
Start: 2018-06-18 | End: 2018-06-20

## 2018-06-18 RX ORDER — IBUPROFEN 200 MG
16 TABLET ORAL
Status: DISCONTINUED | OUTPATIENT
Start: 2018-06-18 | End: 2018-06-20

## 2018-06-18 RX ORDER — IBUPROFEN 200 MG
24 TABLET ORAL
Status: DISCONTINUED | OUTPATIENT
Start: 2018-06-18 | End: 2018-06-18

## 2018-06-18 RX ORDER — GLUCAGON 1 MG
1 KIT INJECTION
Status: DISCONTINUED | OUTPATIENT
Start: 2018-06-18 | End: 2018-06-18

## 2018-06-18 RX ORDER — IBUPROFEN 200 MG
16 TABLET ORAL
Status: DISCONTINUED | OUTPATIENT
Start: 2018-06-18 | End: 2018-06-18

## 2018-06-18 RX ORDER — METOPROLOL TARTRATE 25 MG/1
12.5 TABLET ORAL 2 TIMES DAILY
Status: DISCONTINUED | OUTPATIENT
Start: 2018-06-18 | End: 2018-06-19

## 2018-06-18 RX ADMIN — ATORVASTATIN CALCIUM 80 MG: 20 TABLET, FILM COATED ORAL at 08:06

## 2018-06-18 RX ADMIN — INSULIN ASPART 3 UNITS: 100 INJECTION, SOLUTION INTRAVENOUS; SUBCUTANEOUS at 09:06

## 2018-06-18 RX ADMIN — MAGNESIUM SULFATE IN WATER 2 G: 40 INJECTION, SOLUTION INTRAVENOUS at 01:06

## 2018-06-18 RX ADMIN — HEPARIN SODIUM AND DEXTROSE 17 UNITS/KG/HR: 10000; 5 INJECTION INTRAVENOUS at 07:06

## 2018-06-18 RX ADMIN — INSULIN ASPART 1 UNITS: 100 INJECTION, SOLUTION INTRAVENOUS; SUBCUTANEOUS at 08:06

## 2018-06-18 RX ADMIN — Medication 12.5 MG: at 01:06

## 2018-06-18 RX ADMIN — Medication 12.5 MG: at 08:06

## 2018-06-18 RX ADMIN — HEPARIN SODIUM AND DEXTROSE 17 UNITS/KG/HR: 10000; 5 INJECTION INTRAVENOUS at 02:06

## 2018-06-18 RX ADMIN — INSULIN DETEMIR 9 UNITS: 100 INJECTION, SOLUTION SUBCUTANEOUS at 09:06

## 2018-06-18 RX ADMIN — INSULIN ASPART 3 UNITS: 100 INJECTION, SOLUTION INTRAVENOUS; SUBCUTANEOUS at 12:06

## 2018-06-18 RX ADMIN — Medication 12.5 MG: at 09:06

## 2018-06-18 RX ADMIN — LISINOPRIL 2.5 MG: 2.5 TABLET ORAL at 10:06

## 2018-06-18 RX ADMIN — ASPIRIN 81 MG CHEWABLE TABLET 81 MG: 81 TABLET CHEWABLE at 09:06

## 2018-06-18 RX ADMIN — INSULIN ASPART 2 UNITS: 100 INJECTION, SOLUTION INTRAVENOUS; SUBCUTANEOUS at 06:06

## 2018-06-18 NOTE — PLAN OF CARE
Problem: Patient Care Overview  Goal: Plan of Care Review  Outcome: Ongoing (interventions implemented as appropriate)  VSS. Afebrile. 2 L NC sats 99%. A&Ox4. Denies chest pain currently. 2D echo done by Dr. Escalona. CTS consult in AM. Heparin gtt currently at 17 units/kg/hour on heparin gtt titrating protocol. NS gtt at 110 ml/hr. Pt is a one person standby assist with bathroom privileges. Pt repositions self independently, small rash on LLE; no other skin breakdown noted. Plan of care reviewed with patient and spouse; all questions answered. Will continue to monitor closely.

## 2018-06-18 NOTE — HPI
Mr. Vinson is a 43yo M with PMH of HTN, DM who presented to the ED with chest pain. STEMI with LHC showing multivessel disease. CTS consulted to evaluate for CABG

## 2018-06-18 NOTE — ASSESSMENT & PLAN NOTE
41 y/o M with STEMI, triple vessel disease on cardiac cath including proximal LAD. On medical management, heparin, statin, ASA. Wall motion abnormalities noted on Echo, as well as poor R-wave progression on ECG.     -ASA 81mg after loading with ASA on admission.   -High dose lipitor   -continue heparin gtt   -trend troponin   -Lopressor 12.5mg Q12h  -Lisinopril 2.5mg daily   -2D echo with color flow revealed EF 28%  -CTS consulted for CABG evaluation. Appreciate their assistance.     Plan:  1. Cardiovascular: as above  2. Respiratory: no issues   3. Renal: trend creatinine   4. Neurology: no issues   5. Gi/Nutrition: cardiac, diabetic and low sodium diet   6. Hematology: no issues   7. Infectious disease: no issues   8. Muscleskeletal: no issues   9. Endocrine: POCT glucose Q6h and moderate dose sliding scale   10. Prophylaxis: On systemic AC with heparin gtt

## 2018-06-18 NOTE — NURSING
Pt arrived to SICU from [facility]. Connected to monitor and oriented to room. Pt alert and oriented x4. Heparin gtt currently at 15 units/kg/hr, NS gtt currently at 110 ml/hr. Small rash on left lower leg, ointment applied. No other skin breakdown noted. CTS and SI resident notified of patients arrival.

## 2018-06-18 NOTE — SUBJECTIVE & OBJECTIVE
No current facility-administered medications on file prior to encounter.      No current outpatient prescriptions on file prior to encounter.       Review of patient's allergies indicates:  No Known Allergies    Past Medical History:   Diagnosis Date    Diabetes mellitus     Hypertension      Past Surgical History:   Procedure Laterality Date    HERNIA REPAIR       Family History     None        Social History Main Topics    Smoking status: Never Smoker    Smokeless tobacco: Current User     Types: Snuff    Alcohol use No      Comment: occasion    Drug use: No    Sexual activity: Yes     Review of Systems   Constitutional: Negative for appetite change, chills and fever.   HENT: Negative for trouble swallowing.    Eyes: Negative for visual disturbance.   Respiratory: Negative for chest tightness and shortness of breath.    Cardiovascular: Negative for chest pain and palpitations.   Gastrointestinal: Negative for abdominal pain.   Genitourinary: Negative for difficulty urinating.   Neurological: Negative for seizures and light-headedness.     Objective:     Vital Signs (Most Recent):  Temp: 99.2 °F (37.3 °C) (06/18/18 0700)  Pulse: 98 (06/18/18 1015)  Resp: (!) 22 (06/18/18 1015)  BP: 119/86 (06/18/18 1000)  SpO2: 96 % (06/18/18 1015) Vital Signs (24h Range):  Temp:  [98.3 °F (36.8 °C)-99.4 °F (37.4 °C)] 99.2 °F (37.3 °C)  Pulse:  [] 98  Resp:  [8-48] 22  SpO2:  [92 %-100 %] 96 %  BP: ()/(55-86) 119/86     Weight: 89.4 kg (197 lb 1.5 oz)  Body mass index is 29.97 kg/m².    SpO2: 96 %  O2 Device (Oxygen Therapy): nasal cannula     Intake/Output - Last 3 Shifts       06/16 0700 - 06/17 0659 06/17 0700 - 06/18 0659 06/18 0700 - 06/19 0659    P.O.  240     I.V. (mL/kg)  393.9 (4.4)     Total Intake(mL/kg)  633.9 (7.1)     Urine (mL/kg/hr)  300     Total Output   300      Net   +333.9             Urine Occurrence  1 x 800 x           Lines/Drains/Airways     Peripheral Intravenous Line                  Peripheral IV - Single Lumen 06/17/18 1322 Left Antecubital less than 1 day         Peripheral IV - Single Lumen 06/17/18 1323 Right Antecubital less than 1 day                 STS Risk Score:   Procedure: CAB Only    Risk of Mortality: 0.409%    Morbidity or Mortality: 8.928%    Long Length of Stay: 2.445%    Short Length of Stay: 63.299%    Permanent Stroke: 0.715%    Prolonged Ventilation: 5.6%    DSW Infection: 0.339%    Renal Failure: 0.65%    Reoperation: 3.773%    Physical Exam   Constitutional: He is oriented to person, place, and time. He appears well-developed and well-nourished.   Eyes: Conjunctivae are normal.   Cardiovascular: Normal rate and regular rhythm.    Pulmonary/Chest: Effort normal.   Abdominal: Soft.   Scar at umbilicus   Neurological: He is alert and oriented to person, place, and time.   Skin: Skin is warm and dry.       Significant Labs:  Cardiac markers:   Recent Labs  Lab 06/18/18  0853   TROPONINI 8.389*     CBC:   Recent Labs  Lab 06/18/18  0531   WBC 7.90   RBC 4.26*   HGB 12.1*   HCT 36.0*      MCV 85   MCH 28.4   MCHC 33.6     CMP:   Recent Labs  Lab 06/18/18  0853   *   CALCIUM 8.6*   ALBUMIN 2.8*   PROT 6.2   *   K 4.0   CO2 25      BUN 4*   CREATININE 0.6   ALKPHOS 64   ALT 18   AST 42*   BILITOT 0.7       Significant Diagnostics:  ECHO 6/18/18:  1 - Moderately to severely depressed left ventricular systolic function (EF upper 20s to low 30s).     2 - Mildly depressed right ventricular systolic function    [facility] 6/17/18:     - Left Main Coronary Artery:             The LM has luminal irregularities. There is RAF 3 flow.     - Left Anterior Descending Artery:             The proximal LAD is occluded (100). There is RAF 0 flow. Collateral filling from right coronary artery.     - Left Circumflex Artery:             The LCX has a 90% stenosis. There is RAF 3 flow.     - Right Coronary Artery:             The RCA has a 90% stenosis. There is RAF 3  flow.     - Common Femoral Artery:             The right CFA is normal.     - Superficial Femoral Artery:             The right proximal SFA has a 70% stenosis.                     Lesion Details:   There is severe thrombus. Saddle thrombus.     - Profunda Femoral Artery:             The right proximal PFA has a 70% stenosis.                     Lesion Details:   There is severe thrombus. Saddle Thrombus

## 2018-06-18 NOTE — ASSESSMENT & PLAN NOTE
41yo M with STEMI and multivessel disease    - Dr. Kolb to review angio, please await addendum for plan  - Hgb A1c pending

## 2018-06-18 NOTE — PROGRESS NOTES
[facility]  Cardiology  Progress Note    Patient Name: Kathy Vinson  MRN: [id number]  Admission Date: 6/17/2018  Hospital Length of Stay: 1 days  Code Status: Full Code   Attending Physician: Palu Saravia MD   Primary Care Physician: Melida Martinez MD  Expected Discharge Date:   Principal Problem: STEMI    Subjective:     Hospital Course:   Admitted with STEMI after presenting with chest pain. LHC done on day of admission revealed triple vessel disease. No angioplasty was done and CTS was consulted for CABG. Repeat 2D echo with EF of 28%.     Interval History:   NAEON. Doing well this am. Denies any chest pain. Continues on heparin gtt. CTS to evaluate patient today for CABG.     ROS   Constitutional: no fever or chills  ENT: no nasal congestion or sore throat  Respiratory: no cough or shortness of breath  Cardiovascular: no chest pain or palpitations  Gastrointestinal: no nausea or vomiting, no abdominal pain or abdominal distention   Genitourinary: no hematuria or dysuria  Integument/Breast: no rash or pruritis  Hematologic/Lymphatic: no easy bruising or lymphadenopathy  Musculoskeletal: no arthralgias or myalgias  Neurological: no seizures or tremors  Endocrine: no heat or cold intolerance    Objective:     Vital Signs (Most Recent):  Temp: 99.2 °F (37.3 °C) (06/18/18 0700)  Pulse: 98 (06/18/18 1015)  Resp: (!) 22 (06/18/18 1015)  BP: 119/86 (06/18/18 1000)  SpO2: 96 % (06/18/18 1015) Vital Signs (24h Range):  Temp:  [98.3 °F (36.8 °C)-99.4 °F (37.4 °C)] 99.2 °F (37.3 °C)  Pulse:  [] 98  Resp:  [8-48] 22  SpO2:  [92 %-100 %] 96 %  BP: ()/(55-86) 119/86     Weight: 89.4 kg (197 lb 1.5 oz)  Body mass index is 29.97 kg/m².     SpO2: 96 %  O2 Device (Oxygen Therapy): nasal cannula      Intake/Output Summary (Last 24 hours) at 06/18/18 1104  Last data filed at 06/17/18 1900   Gross per 24 hour   Intake           633.87 ml   Output              300 ml   Net           333.87 ml        Lines/Drains/Airways     Peripheral Intravenous Line                 Peripheral IV - Single Lumen 06/17/18 1322 Left Antecubital less than 1 day         Peripheral IV - Single Lumen 06/17/18 1323 Right Antecubital less than 1 day                Physical Exam  General: well developed, well nourished, appears to be in NAD  Eyes: conjunctivae/corneas clear. PERRL. EOMI  Neck: supple, symmetrical, trachea midline, no JVD  Cardiovascular: Heart: regular rate and rhythm, S1, S2 normal, no murmur, click, rub or gallop.  Lungs: + bilateral basilar crackles, normal respiratory effort  Chest Wall: no tenderness  Abdomen/Rectal: Abdomen: Non distended. + BS. No masses. No TTP.   Extremities: no edema, no redness or tenderness in the calves or thighs. Pulses: 2+ and symmetric  Skin: Skin color, texture, turgor normal. No rashes or lesions  Musculoskeletal: full range of motion of joints  Lymph Nodes: No cervical or supraclavicular adenopathy  Psych/Behavioral: Normal. Alert and oriented, appropriate affect.    Significant Labs:   Recent Results (from the past 24 hour(s))   POCT glucose    Collection Time: 06/17/18  1:19 PM   Result Value Ref Range    POCT Glucose 221 (H) 70 - 110 mg/dL   CBC auto differential    Collection Time: 06/17/18  1:20 PM   Result Value Ref Range    WBC 8.02 3.90 - 12.70 K/uL    RBC 4.97 4.60 - 6.20 M/uL    Hemoglobin 14.4 14.0 - 18.0 g/dL    Hematocrit 42.2 40.0 - 54.0 %    MCV 85 82 - 98 fL    MCH 29.0 27.0 - 31.0 pg    MCHC 34.1 32.0 - 36.0 g/dL    RDW 12.5 11.5 - 14.5 %    Platelets 392 (H) 150 - 350 K/uL    MPV 10.4 9.2 - 12.9 fL    Gran # (ANC) 4.5 1.8 - 7.7 K/uL    Lymph # 2.6 1.0 - 4.8 K/uL    Mono # 0.7 0.3 - 1.0 K/uL    Eos # 0.2 0.0 - 0.5 K/uL    Baso # 0.01 0.00 - 0.20 K/uL    Gran% 56.0 38.0 - 73.0 %    Lymph% 32.2 18.0 - 48.0 %    Mono% 8.7 4.0 - 15.0 %    Eosinophil% 3.0 0.0 - 8.0 %    Basophil% 0.1 0.0 - 1.9 %    Differential Method Automated    Comprehensive metabolic panel     Collection Time: 06/17/18  1:20 PM   Result Value Ref Range    Sodium 133 (L) 136 - 145 mmol/L    Potassium 4.0 3.5 - 5.1 mmol/L    Chloride 94 (L) 95 - 110 mmol/L    CO2 25 23 - 29 mmol/L    Glucose 478 (HH) 70 - 110 mg/dL    BUN, Bld 6 6 - 20 mg/dL    Creatinine 1.2 0.5 - 1.4 mg/dL    Calcium 10.0 8.7 - 10.5 mg/dL    Total Protein 8.0 6.0 - 8.4 g/dL    Albumin 3.7 3.5 - 5.2 g/dL    Total Bilirubin 0.6 0.1 - 1.0 mg/dL    Alkaline Phosphatase 75 55 - 135 U/L    AST 52 (H) 10 - 40 U/L    ALT 26 10 - 44 U/L    Anion Gap 14 8 - 16 mmol/L    eGFR if African American >60 >60 mL/min/1.73 m^2    eGFR if non African American >60 >60 mL/min/1.73 m^2   Troponin I #1    Collection Time: 06/17/18  1:20 PM   Result Value Ref Range    Troponin I 4.644 (H) 0.000 - 0.026 ng/mL   B-Type natriuretic peptide (BNP)    Collection Time: 06/17/18  1:20 PM   Result Value Ref Range     (H) 0 - 99 pg/mL   D dimer, quantitative    Collection Time: 06/17/18  1:20 PM   Result Value Ref Range    D-Dimer 0.36 <0.50 mg/L FEU   Cath lab procedure    Collection Time: 06/17/18  2:00 PM   Result Value Ref Range    Coronary Stenosis >= 50% (A)     Peripheral Stenosis >= 50% (A)    APTT    Collection Time: 06/17/18  3:30 PM   Result Value Ref Range    aPTT 122.5 (H) 21.0 - 32.0 sec   CBC auto differential    Collection Time: 06/17/18  3:30 PM   Result Value Ref Range    WBC 8.28 3.90 - 12.70 K/uL    RBC 4.80 4.60 - 6.20 M/uL    Hemoglobin 13.5 (L) 14.0 - 18.0 g/dL    Hematocrit 38.9 (L) 40.0 - 54.0 %    MCV 81 (L) 82 - 98 fL    MCH 28.1 27.0 - 31.0 pg    MCHC 34.7 32.0 - 36.0 g/dL    RDW 12.3 11.5 - 14.5 %    Platelets 371 (H) 150 - 350 K/uL    MPV 10.2 9.2 - 12.9 fL    Gran # (ANC) 4.5 1.8 - 7.7 K/uL    Lymph # 2.6 1.0 - 4.8 K/uL    Mono # 0.8 0.3 - 1.0 K/uL    Eos # 0.4 0.0 - 0.5 K/uL    Baso # 0.04 0.00 - 0.20 K/uL    Gran% 53.7 38.0 - 73.0 %    Lymph% 31.9 18.0 - 48.0 %    Mono% 9.2 4.0 - 15.0 %    Eosinophil% 4.5 0.0 - 8.0 %    Basophil% 0.5  0.0 - 1.9 %    Differential Method Automated    Comprehensive metabolic panel    Collection Time: 06/17/18  3:30 PM   Result Value Ref Range    Sodium 134 (L) 136 - 145 mmol/L    Potassium 4.0 3.5 - 5.1 mmol/L    Chloride 99 95 - 110 mmol/L    CO2 22 (L) 23 - 29 mmol/L    Glucose 360 (H) 70 - 110 mg/dL    BUN, Bld 5 (L) 6 - 20 mg/dL    Creatinine 0.9 0.5 - 1.4 mg/dL    Calcium 9.4 8.7 - 10.5 mg/dL    Total Protein 7.0 6.0 - 8.4 g/dL    Albumin 3.4 (L) 3.5 - 5.2 g/dL    Total Bilirubin 0.5 0.1 - 1.0 mg/dL    Alkaline Phosphatase 65 55 - 135 U/L    AST 46 (H) 10 - 40 U/L    ALT 24 10 - 44 U/L    Anion Gap 13 8 - 16 mmol/L    eGFR if African American >60 >60 mL/min/1.73 m^2    eGFR if non African American >60 >60 mL/min/1.73 m^2   Protime-INR    Collection Time: 06/17/18  3:30 PM   Result Value Ref Range    Prothrombin Time 11.7 9.0 - 12.5 sec    INR 1.1 0.8 - 1.2   Troponin I #2    Collection Time: 06/17/18  3:54 PM   Result Value Ref Range    Troponin I 4.756 (H) 0.000 - 0.026 ng/mL   Anti-Xa Heparin Monitoring    Collection Time: 06/17/18  4:31 PM   Result Value Ref Range    Heparin Anti-Xa 0.81 (H) 0.30 - 0.70 IU/mL   POCT glucose    Collection Time: 06/17/18  4:50 PM   Result Value Ref Range    POCT Glucose 313 (H) 70 - 110 mg/dL   ISTAT ACT-K    Collection Time: 06/17/18  4:52 PM   Result Value Ref Range    POC ACTIVATED CLOTTING TIME K 153 (H) 74 - 137 sec    Sample UNK    CBC auto differential    Collection Time: 06/17/18 10:26 PM   Result Value Ref Range    WBC 8.55 3.90 - 12.70 K/uL    RBC 4.30 (L) 4.60 - 6.20 M/uL    Hemoglobin 12.1 (L) 14.0 - 18.0 g/dL    Hematocrit 36.9 (L) 40.0 - 54.0 %    MCV 86 82 - 98 fL    MCH 28.1 27.0 - 31.0 pg    MCHC 32.8 32.0 - 36.0 g/dL    RDW 12.2 11.5 - 14.5 %    Platelets 351 (H) 150 - 350 K/uL    MPV 10.1 9.2 - 12.9 fL    Immature Granulocytes 0.5 0.0 - 0.5 %    Gran # (ANC) 4.9 1.8 - 7.7 K/uL    Immature Grans (Abs) 0.04 0.00 - 0.04 K/uL    Lymph # 2.4 1.0 - 4.8 K/uL     Mono # 0.9 0.3 - 1.0 K/uL    Eos # 0.3 0.0 - 0.5 K/uL    Baso # 0.05 0.00 - 0.20 K/uL    nRBC 0 0 /100 WBC    Gran% 57.2 38.0 - 73.0 %    Lymph% 28.2 18.0 - 48.0 %    Mono% 10.2 4.0 - 15.0 %    Eosinophil% 3.3 0.0 - 8.0 %    Basophil% 0.6 0.0 - 1.9 %    Differential Method Automated    Comprehensive metabolic panel    Collection Time: 06/17/18 10:26 PM   Result Value Ref Range    Sodium 133 (L) 136 - 145 mmol/L    Potassium 3.8 3.5 - 5.1 mmol/L    Chloride 98 95 - 110 mmol/L    CO2 23 23 - 29 mmol/L    Glucose 385 (H) 70 - 110 mg/dL    BUN, Bld 6 6 - 20 mg/dL    Creatinine 0.8 0.5 - 1.4 mg/dL    Calcium 8.6 (L) 8.7 - 10.5 mg/dL    Total Protein 6.4 6.0 - 8.4 g/dL    Albumin 3.0 (L) 3.5 - 5.2 g/dL    Total Bilirubin 0.4 0.1 - 1.0 mg/dL    Alkaline Phosphatase 60 55 - 135 U/L    AST 51 (H) 10 - 40 U/L    ALT 21 10 - 44 U/L    Anion Gap 12 8 - 16 mmol/L    eGFR if African American >60.0 >60 mL/min/1.73 m^2    eGFR if non African American >60.0 >60 mL/min/1.73 m^2   Troponin I    Collection Time: 06/17/18 10:26 PM   Result Value Ref Range    Troponin I 8.739 (H) 0.000 - 0.026 ng/mL   Anti-Xa Heparin Monitoring    Collection Time: 06/17/18 10:26 PM   Result Value Ref Range    Heparin Anti-Xa 0.23 (L) 0.30 - 0.70 IU/mL   CBC auto differential    Collection Time: 06/17/18 10:26 PM   Result Value Ref Range    WBC 8.55 3.90 - 12.70 K/uL    RBC 4.30 (L) 4.60 - 6.20 M/uL    Hemoglobin 12.1 (L) 14.0 - 18.0 g/dL    Hematocrit 36.9 (L) 40.0 - 54.0 %    MCV 86 82 - 98 fL    MCH 28.1 27.0 - 31.0 pg    MCHC 32.8 32.0 - 36.0 g/dL    RDW 12.2 11.5 - 14.5 %    Platelets 351 (H) 150 - 350 K/uL    MPV 10.1 9.2 - 12.9 fL    Immature Granulocytes 0.5 0.0 - 0.5 %    Gran # (ANC) 4.9 1.8 - 7.7 K/uL    Immature Grans (Abs) 0.04 0.00 - 0.04 K/uL    Lymph # 2.4 1.0 - 4.8 K/uL    Mono # 0.9 0.3 - 1.0 K/uL    Eos # 0.3 0.0 - 0.5 K/uL    Baso # 0.05 0.00 - 0.20 K/uL    nRBC 0 0 /100 WBC    Gran% 57.2 38.0 - 73.0 %    Lymph% 28.2 18.0 - 48.0 %     Mono% 10.2 4.0 - 15.0 %    Eosinophil% 3.3 0.0 - 8.0 %    Basophil% 0.6 0.0 - 1.9 %    Differential Method Automated    Protime-INR    Collection Time: 06/17/18 10:26 PM   Result Value Ref Range    Prothrombin Time 11.0 9.0 - 12.5 sec    INR 1.1 0.8 - 1.2   POCT glucose    Collection Time: 06/17/18 10:53 PM   Result Value Ref Range    POCT Glucose 332 (H) 70 - 110 mg/dL   POCT glucose    Collection Time: 06/17/18 11:18 PM   Result Value Ref Range    POCT Glucose 326 (H) 70 - 110 mg/dL   Anti-Xa Heparin Monitoring    Collection Time: 06/18/18  5:31 AM   Result Value Ref Range    Heparin Anti-Xa 0.33 0.30 - 0.70 IU/mL   CBC auto differential    Collection Time: 06/18/18  5:31 AM   Result Value Ref Range    WBC 7.90 3.90 - 12.70 K/uL    RBC 4.26 (L) 4.60 - 6.20 M/uL    Hemoglobin 12.1 (L) 14.0 - 18.0 g/dL    Hematocrit 36.0 (L) 40.0 - 54.0 %    MCV 85 82 - 98 fL    MCH 28.4 27.0 - 31.0 pg    MCHC 33.6 32.0 - 36.0 g/dL    RDW 12.3 11.5 - 14.5 %    Platelets 330 150 - 350 K/uL    MPV 10.5 9.2 - 12.9 fL    Immature Granulocytes 0.3 0.0 - 0.5 %    Gran # (ANC) 4.6 1.8 - 7.7 K/uL    Immature Grans (Abs) 0.02 0.00 - 0.04 K/uL    Lymph # 2.1 1.0 - 4.8 K/uL    Mono # 0.9 0.3 - 1.0 K/uL    Eos # 0.2 0.0 - 0.5 K/uL    Baso # 0.03 0.00 - 0.20 K/uL    nRBC 0 0 /100 WBC    Gran% 58.0 38.0 - 73.0 %    Lymph% 27.1 18.0 - 48.0 %    Mono% 11.5 4.0 - 15.0 %    Eosinophil% 2.7 0.0 - 8.0 %    Basophil% 0.4 0.0 - 1.9 %    Differential Method Automated    POCT glucose    Collection Time: 06/18/18  5:31 AM   Result Value Ref Range    POCT Glucose 263 (H) 70 - 110 mg/dL   POCT glucose    Collection Time: 06/18/18  7:48 AM   Result Value Ref Range    POCT Glucose 273 (H) 70 - 110 mg/dL   2D echo with color flow doppler    Collection Time: 06/18/18  8:00 AM   Result Value Ref Range    EF 28 (A) 55 - 65   Troponin I    Collection Time: 06/18/18  8:53 AM   Result Value Ref Range    Troponin I 8.389 (H) 0.000 - 0.026 ng/mL   Comprehensive  metabolic panel    Collection Time: 06/18/18  8:53 AM   Result Value Ref Range    Sodium 135 (L) 136 - 145 mmol/L    Potassium 4.0 3.5 - 5.1 mmol/L    Chloride 102 95 - 110 mmol/L    CO2 25 23 - 29 mmol/L    Glucose 271 (H) 70 - 110 mg/dL    BUN, Bld 4 (L) 6 - 20 mg/dL    Creatinine 0.6 0.5 - 1.4 mg/dL    Calcium 8.6 (L) 8.7 - 10.5 mg/dL    Total Protein 6.2 6.0 - 8.4 g/dL    Albumin 2.8 (L) 3.5 - 5.2 g/dL    Total Bilirubin 0.7 0.1 - 1.0 mg/dL    Alkaline Phosphatase 64 55 - 135 U/L    AST 42 (H) 10 - 40 U/L    ALT 18 10 - 44 U/L    Anion Gap 8 8 - 16 mmol/L    eGFR if African American >60.0 >60 mL/min/1.73 m^2    eGFR if non African American >60.0 >60 mL/min/1.73 m^2         Significant Imaging:   Obtaining CXR today.    Assessment and Plan:     Brief HPI:       STEMI (ST elevation myocardial infarction)    43 y/o M with STEMI, triple vessel disease on cardiac cath including proximal LAD. On medical management, heparin, statin, ASA. Wall motion abnormalities noted on Echo, as well as poor R-wave progression on ECG.     -ASA 81mg after loading with ASA on admission.   -High dose lipitor   -continue heparin gtt   -trend troponin   -2D echo with color flow revealed EF 28%  -CTS consulted for CABG evaluation. Appreciate their assistance.   - observe for signs of ADHF. Started on lisinopril 2.5mg and lopressor 12.5mg bid.     Plan:  1. Cardiovascular: as above  2. Respiratory: no issues   3. Renal: trend creatinine   4. Neurology: no issues   5. Gi/Nutrition: cardiac, diabetic and low sodium diet   6. Hematology: no issues   7. Infectious disease: no issues   8. Muscleskeletal: no issues   9. Endocrine: POCT glucose Q6h and moderate dose sliding scale   10. Prophylaxis: On systemic AC with heparin gtt              VTE Risk Mitigation         Ordered     heparin 25,000 units in dextrose 5% 250 mL (100 units/mL) bolus from bag; ADDITIONAL PRN BOLUS  As needed (PRN)      06/17/18 2225     heparin 25,000 units in dextrose  5% 250 mL (100 units/mL) bolus from bag; ADDITIONAL PRN BOLUS  As needed (PRN)      06/17/18 2205     IP VTE LOW RISK PATIENT  Once      06/17/18 2202     heparin 25,000 units in dextrose 5% 250 mL (100 units/mL) infusion (heparin infusion)  Continuous      06/17/18 1924          Gunner Thomas MD  Cardiology  [facility]

## 2018-06-18 NOTE — SUBJECTIVE & OBJECTIVE
Interval History:   NAEON. Doing well this am. Denies any chest pain. Continues on heparin gtt. CTS to evaluate patient today for CABG.     ROS   Constitutional: no fever or chills  ENT: no nasal congestion or sore throat  Respiratory: no cough or shortness of breath  Cardiovascular: no chest pain or palpitations  Gastrointestinal: no nausea or vomiting, no abdominal pain or abdominal distention   Genitourinary: no hematuria or dysuria  Integument/Breast: no rash or pruritis  Hematologic/Lymphatic: no easy bruising or lymphadenopathy  Musculoskeletal: no arthralgias or myalgias  Neurological: no seizures or tremors  Endocrine: no heat or cold intolerance    Objective:     Vital Signs (Most Recent):  Temp: 99.2 °F (37.3 °C) (06/18/18 0700)  Pulse: 98 (06/18/18 1015)  Resp: (!) 22 (06/18/18 1015)  BP: 119/86 (06/18/18 1000)  SpO2: 96 % (06/18/18 1015) Vital Signs (24h Range):  Temp:  [98.3 °F (36.8 °C)-99.4 °F (37.4 °C)] 99.2 °F (37.3 °C)  Pulse:  [] 98  Resp:  [8-48] 22  SpO2:  [92 %-100 %] 96 %  BP: ()/(55-86) 119/86     Weight: 89.4 kg (197 lb 1.5 oz)  Body mass index is 29.97 kg/m².     SpO2: 96 %  O2 Device (Oxygen Therapy): nasal cannula      Intake/Output Summary (Last 24 hours) at 06/18/18 1104  Last data filed at 06/17/18 1900   Gross per 24 hour   Intake           633.87 ml   Output              300 ml   Net           333.87 ml       Lines/Drains/Airways     Peripheral Intravenous Line                 Peripheral IV - Single Lumen 06/17/18 1322 Left Antecubital less than 1 day         Peripheral IV - Single Lumen 06/17/18 1323 Right Antecubital less than 1 day                Physical Exam  General: well developed, well nourished, appears to be in NAD  Eyes: conjunctivae/corneas clear. PERRL. EOMI  Neck: supple, symmetrical, trachea midline, no JVD  Cardiovascular: Heart: regular rate and rhythm, S1, S2 normal, no murmur, click, rub or gallop.  Lungs: + bilateral basilar crackles, normal  respiratory effort  Chest Wall: no tenderness  Abdomen/Rectal: Abdomen: Non distended. + BS. No masses. No TTP.   Extremities: no edema, no redness or tenderness in the calves or thighs. Pulses: 2+ and symmetric  Skin: Skin color, texture, turgor normal. No rashes or lesions  Musculoskeletal: full range of motion of joints  Lymph Nodes: No cervical or supraclavicular adenopathy  Psych/Behavioral: Normal. Alert and oriented, appropriate affect.    Significant Labs:   Recent Results (from the past 24 hour(s))   POCT glucose    Collection Time: 06/17/18  1:19 PM   Result Value Ref Range    POCT Glucose 221 (H) 70 - 110 mg/dL   CBC auto differential    Collection Time: 06/17/18  1:20 PM   Result Value Ref Range    WBC 8.02 3.90 - 12.70 K/uL    RBC 4.97 4.60 - 6.20 M/uL    Hemoglobin 14.4 14.0 - 18.0 g/dL    Hematocrit 42.2 40.0 - 54.0 %    MCV 85 82 - 98 fL    MCH 29.0 27.0 - 31.0 pg    MCHC 34.1 32.0 - 36.0 g/dL    RDW 12.5 11.5 - 14.5 %    Platelets 392 (H) 150 - 350 K/uL    MPV 10.4 9.2 - 12.9 fL    Gran # (ANC) 4.5 1.8 - 7.7 K/uL    Lymph # 2.6 1.0 - 4.8 K/uL    Mono # 0.7 0.3 - 1.0 K/uL    Eos # 0.2 0.0 - 0.5 K/uL    Baso # 0.01 0.00 - 0.20 K/uL    Gran% 56.0 38.0 - 73.0 %    Lymph% 32.2 18.0 - 48.0 %    Mono% 8.7 4.0 - 15.0 %    Eosinophil% 3.0 0.0 - 8.0 %    Basophil% 0.1 0.0 - 1.9 %    Differential Method Automated    Comprehensive metabolic panel    Collection Time: 06/17/18  1:20 PM   Result Value Ref Range    Sodium 133 (L) 136 - 145 mmol/L    Potassium 4.0 3.5 - 5.1 mmol/L    Chloride 94 (L) 95 - 110 mmol/L    CO2 25 23 - 29 mmol/L    Glucose 478 (HH) 70 - 110 mg/dL    BUN, Bld 6 6 - 20 mg/dL    Creatinine 1.2 0.5 - 1.4 mg/dL    Calcium 10.0 8.7 - 10.5 mg/dL    Total Protein 8.0 6.0 - 8.4 g/dL    Albumin 3.7 3.5 - 5.2 g/dL    Total Bilirubin 0.6 0.1 - 1.0 mg/dL    Alkaline Phosphatase 75 55 - 135 U/L    AST 52 (H) 10 - 40 U/L    ALT 26 10 - 44 U/L    Anion Gap 14 8 - 16 mmol/L    eGFR if African American >60  >60 mL/min/1.73 m^2    eGFR if non African American >60 >60 mL/min/1.73 m^2   Troponin I #1    Collection Time: 06/17/18  1:20 PM   Result Value Ref Range    Troponin I 4.644 (H) 0.000 - 0.026 ng/mL   B-Type natriuretic peptide (BNP)    Collection Time: 06/17/18  1:20 PM   Result Value Ref Range     (H) 0 - 99 pg/mL   D dimer, quantitative    Collection Time: 06/17/18  1:20 PM   Result Value Ref Range    D-Dimer 0.36 <0.50 mg/L FEU   Cath lab procedure    Collection Time: 06/17/18  2:00 PM   Result Value Ref Range    Coronary Stenosis >= 50% (A)     Peripheral Stenosis >= 50% (A)    APTT    Collection Time: 06/17/18  3:30 PM   Result Value Ref Range    aPTT 122.5 (H) 21.0 - 32.0 sec   CBC auto differential    Collection Time: 06/17/18  3:30 PM   Result Value Ref Range    WBC 8.28 3.90 - 12.70 K/uL    RBC 4.80 4.60 - 6.20 M/uL    Hemoglobin 13.5 (L) 14.0 - 18.0 g/dL    Hematocrit 38.9 (L) 40.0 - 54.0 %    MCV 81 (L) 82 - 98 fL    MCH 28.1 27.0 - 31.0 pg    MCHC 34.7 32.0 - 36.0 g/dL    RDW 12.3 11.5 - 14.5 %    Platelets 371 (H) 150 - 350 K/uL    MPV 10.2 9.2 - 12.9 fL    Gran # (ANC) 4.5 1.8 - 7.7 K/uL    Lymph # 2.6 1.0 - 4.8 K/uL    Mono # 0.8 0.3 - 1.0 K/uL    Eos # 0.4 0.0 - 0.5 K/uL    Baso # 0.04 0.00 - 0.20 K/uL    Gran% 53.7 38.0 - 73.0 %    Lymph% 31.9 18.0 - 48.0 %    Mono% 9.2 4.0 - 15.0 %    Eosinophil% 4.5 0.0 - 8.0 %    Basophil% 0.5 0.0 - 1.9 %    Differential Method Automated    Comprehensive metabolic panel    Collection Time: 06/17/18  3:30 PM   Result Value Ref Range    Sodium 134 (L) 136 - 145 mmol/L    Potassium 4.0 3.5 - 5.1 mmol/L    Chloride 99 95 - 110 mmol/L    CO2 22 (L) 23 - 29 mmol/L    Glucose 360 (H) 70 - 110 mg/dL    BUN, Bld 5 (L) 6 - 20 mg/dL    Creatinine 0.9 0.5 - 1.4 mg/dL    Calcium 9.4 8.7 - 10.5 mg/dL    Total Protein 7.0 6.0 - 8.4 g/dL    Albumin 3.4 (L) 3.5 - 5.2 g/dL    Total Bilirubin 0.5 0.1 - 1.0 mg/dL    Alkaline Phosphatase 65 55 - 135 U/L    AST 46 (H) 10 - 40  U/L    ALT 24 10 - 44 U/L    Anion Gap 13 8 - 16 mmol/L    eGFR if African American >60 >60 mL/min/1.73 m^2    eGFR if non African American >60 >60 mL/min/1.73 m^2   Protime-INR    Collection Time: 06/17/18  3:30 PM   Result Value Ref Range    Prothrombin Time 11.7 9.0 - 12.5 sec    INR 1.1 0.8 - 1.2   Troponin I #2    Collection Time: 06/17/18  3:54 PM   Result Value Ref Range    Troponin I 4.756 (H) 0.000 - 0.026 ng/mL   Anti-Xa Heparin Monitoring    Collection Time: 06/17/18  4:31 PM   Result Value Ref Range    Heparin Anti-Xa 0.81 (H) 0.30 - 0.70 IU/mL   POCT glucose    Collection Time: 06/17/18  4:50 PM   Result Value Ref Range    POCT Glucose 313 (H) 70 - 110 mg/dL   ISTAT ACT-K    Collection Time: 06/17/18  4:52 PM   Result Value Ref Range    POC ACTIVATED CLOTTING TIME K 153 (H) 74 - 137 sec    Sample UNK    CBC auto differential    Collection Time: 06/17/18 10:26 PM   Result Value Ref Range    WBC 8.55 3.90 - 12.70 K/uL    RBC 4.30 (L) 4.60 - 6.20 M/uL    Hemoglobin 12.1 (L) 14.0 - 18.0 g/dL    Hematocrit 36.9 (L) 40.0 - 54.0 %    MCV 86 82 - 98 fL    MCH 28.1 27.0 - 31.0 pg    MCHC 32.8 32.0 - 36.0 g/dL    RDW 12.2 11.5 - 14.5 %    Platelets 351 (H) 150 - 350 K/uL    MPV 10.1 9.2 - 12.9 fL    Immature Granulocytes 0.5 0.0 - 0.5 %    Gran # (ANC) 4.9 1.8 - 7.7 K/uL    Immature Grans (Abs) 0.04 0.00 - 0.04 K/uL    Lymph # 2.4 1.0 - 4.8 K/uL    Mono # 0.9 0.3 - 1.0 K/uL    Eos # 0.3 0.0 - 0.5 K/uL    Baso # 0.05 0.00 - 0.20 K/uL    nRBC 0 0 /100 WBC    Gran% 57.2 38.0 - 73.0 %    Lymph% 28.2 18.0 - 48.0 %    Mono% 10.2 4.0 - 15.0 %    Eosinophil% 3.3 0.0 - 8.0 %    Basophil% 0.6 0.0 - 1.9 %    Differential Method Automated    Comprehensive metabolic panel    Collection Time: 06/17/18 10:26 PM   Result Value Ref Range    Sodium 133 (L) 136 - 145 mmol/L    Potassium 3.8 3.5 - 5.1 mmol/L    Chloride 98 95 - 110 mmol/L    CO2 23 23 - 29 mmol/L    Glucose 385 (H) 70 - 110 mg/dL    BUN, Bld 6 6 - 20 mg/dL     Creatinine 0.8 0.5 - 1.4 mg/dL    Calcium 8.6 (L) 8.7 - 10.5 mg/dL    Total Protein 6.4 6.0 - 8.4 g/dL    Albumin 3.0 (L) 3.5 - 5.2 g/dL    Total Bilirubin 0.4 0.1 - 1.0 mg/dL    Alkaline Phosphatase 60 55 - 135 U/L    AST 51 (H) 10 - 40 U/L    ALT 21 10 - 44 U/L    Anion Gap 12 8 - 16 mmol/L    eGFR if African American >60.0 >60 mL/min/1.73 m^2    eGFR if non African American >60.0 >60 mL/min/1.73 m^2   Troponin I    Collection Time: 06/17/18 10:26 PM   Result Value Ref Range    Troponin I 8.739 (H) 0.000 - 0.026 ng/mL   Anti-Xa Heparin Monitoring    Collection Time: 06/17/18 10:26 PM   Result Value Ref Range    Heparin Anti-Xa 0.23 (L) 0.30 - 0.70 IU/mL   CBC auto differential    Collection Time: 06/17/18 10:26 PM   Result Value Ref Range    WBC 8.55 3.90 - 12.70 K/uL    RBC 4.30 (L) 4.60 - 6.20 M/uL    Hemoglobin 12.1 (L) 14.0 - 18.0 g/dL    Hematocrit 36.9 (L) 40.0 - 54.0 %    MCV 86 82 - 98 fL    MCH 28.1 27.0 - 31.0 pg    MCHC 32.8 32.0 - 36.0 g/dL    RDW 12.2 11.5 - 14.5 %    Platelets 351 (H) 150 - 350 K/uL    MPV 10.1 9.2 - 12.9 fL    Immature Granulocytes 0.5 0.0 - 0.5 %    Gran # (ANC) 4.9 1.8 - 7.7 K/uL    Immature Grans (Abs) 0.04 0.00 - 0.04 K/uL    Lymph # 2.4 1.0 - 4.8 K/uL    Mono # 0.9 0.3 - 1.0 K/uL    Eos # 0.3 0.0 - 0.5 K/uL    Baso # 0.05 0.00 - 0.20 K/uL    nRBC 0 0 /100 WBC    Gran% 57.2 38.0 - 73.0 %    Lymph% 28.2 18.0 - 48.0 %    Mono% 10.2 4.0 - 15.0 %    Eosinophil% 3.3 0.0 - 8.0 %    Basophil% 0.6 0.0 - 1.9 %    Differential Method Automated    Protime-INR    Collection Time: 06/17/18 10:26 PM   Result Value Ref Range    Prothrombin Time 11.0 9.0 - 12.5 sec    INR 1.1 0.8 - 1.2   POCT glucose    Collection Time: 06/17/18 10:53 PM   Result Value Ref Range    POCT Glucose 332 (H) 70 - 110 mg/dL   POCT glucose    Collection Time: 06/17/18 11:18 PM   Result Value Ref Range    POCT Glucose 326 (H) 70 - 110 mg/dL   Anti-Xa Heparin Monitoring    Collection Time: 06/18/18  5:31 AM   Result  Value Ref Range    Heparin Anti-Xa 0.33 0.30 - 0.70 IU/mL   CBC auto differential    Collection Time: 06/18/18  5:31 AM   Result Value Ref Range    WBC 7.90 3.90 - 12.70 K/uL    RBC 4.26 (L) 4.60 - 6.20 M/uL    Hemoglobin 12.1 (L) 14.0 - 18.0 g/dL    Hematocrit 36.0 (L) 40.0 - 54.0 %    MCV 85 82 - 98 fL    MCH 28.4 27.0 - 31.0 pg    MCHC 33.6 32.0 - 36.0 g/dL    RDW 12.3 11.5 - 14.5 %    Platelets 330 150 - 350 K/uL    MPV 10.5 9.2 - 12.9 fL    Immature Granulocytes 0.3 0.0 - 0.5 %    Gran # (ANC) 4.6 1.8 - 7.7 K/uL    Immature Grans (Abs) 0.02 0.00 - 0.04 K/uL    Lymph # 2.1 1.0 - 4.8 K/uL    Mono # 0.9 0.3 - 1.0 K/uL    Eos # 0.2 0.0 - 0.5 K/uL    Baso # 0.03 0.00 - 0.20 K/uL    nRBC 0 0 /100 WBC    Gran% 58.0 38.0 - 73.0 %    Lymph% 27.1 18.0 - 48.0 %    Mono% 11.5 4.0 - 15.0 %    Eosinophil% 2.7 0.0 - 8.0 %    Basophil% 0.4 0.0 - 1.9 %    Differential Method Automated    POCT glucose    Collection Time: 06/18/18  5:31 AM   Result Value Ref Range    POCT Glucose 263 (H) 70 - 110 mg/dL   POCT glucose    Collection Time: 06/18/18  7:48 AM   Result Value Ref Range    POCT Glucose 273 (H) 70 - 110 mg/dL   2D echo with color flow doppler    Collection Time: 06/18/18  8:00 AM   Result Value Ref Range    EF 28 (A) 55 - 65   Troponin I    Collection Time: 06/18/18  8:53 AM   Result Value Ref Range    Troponin I 8.389 (H) 0.000 - 0.026 ng/mL   Comprehensive metabolic panel    Collection Time: 06/18/18  8:53 AM   Result Value Ref Range    Sodium 135 (L) 136 - 145 mmol/L    Potassium 4.0 3.5 - 5.1 mmol/L    Chloride 102 95 - 110 mmol/L    CO2 25 23 - 29 mmol/L    Glucose 271 (H) 70 - 110 mg/dL    BUN, Bld 4 (L) 6 - 20 mg/dL    Creatinine 0.6 0.5 - 1.4 mg/dL    Calcium 8.6 (L) 8.7 - 10.5 mg/dL    Total Protein 6.2 6.0 - 8.4 g/dL    Albumin 2.8 (L) 3.5 - 5.2 g/dL    Total Bilirubin 0.7 0.1 - 1.0 mg/dL    Alkaline Phosphatase 64 55 - 135 U/L    AST 42 (H) 10 - 40 U/L    ALT 18 10 - 44 U/L    Anion Gap 8 8 - 16 mmol/L    eGFR  if African American >60.0 >60 mL/min/1.73 m^2    eGFR if non African American >60.0 >60 mL/min/1.73 m^2         Significant Imaging:   Obtaining CXR today.

## 2018-06-18 NOTE — ASSESSMENT & PLAN NOTE
43 y/o M with NSTEMi, triple vessel disease on cardiac cath including proximal LAD. On medical management, heparin, statin, ASA. Wall motion abnormalities noted on Echo, as well as poor R-wave progression on ECG.     -ASA 81mg   -High dose lipitor   -Heparin Gtt   -troponin trend   -Lopressor 12.5mg Q12h  -Lisinopril 2.5mg daily   -2D echo with color flow  -CTS consult     Plan:  1. Cardiovascular: as above  2. Respiratory: no issues   3. Renal: trend creatinine   4. Neurology: no issues   5. Gi/Nutrition: cardiac diet   6. Hematology: no issues   7. Infectious disease: no issues   8. Muscleskeletal: no issues   9. Endocrine: POCT glucose Q6h and moderate dose sliding scale   10. Prophylaxis: On system AC

## 2018-06-18 NOTE — HPI
42-year-old gentleman admitted to CCU with NSTEMi, triple vessel disease.    Past medical history of hypertension, diabetes presents with mild to moderate midsternal chest discomfort that radiates to the left arm.  It occurs intermittently ×1 week, usually after meals lasting 1-2 hours.  He initially noted it when he was eating.  Since yesterday chest discomfort at rest while sleeping and continuing into today morning. Today at [facility] the CP continued and concerned him enough to come into the emergency department.  Denies nausea, vomiting, shortness of breath, lightheadedness.    Cardiac cath today   Left Main Coronary Artery:             The LM has luminal irregularities. There is RAF 3 flow.     - Left Anterior Descending Artery:             The proximal LAD is occluded (100). There is RAF 0 flow. Collateral filling from right coronary artery.     - Left Circumflex Artery:             The LCX has a 90% stenosis. There is RAF 3 flow.     - Right Coronary Artery:             The RCA has a 90% stenosis. There is RAF 3 flow.     - Common Femoral Artery:             The right CFA is normal.     - Superficial Femoral Artery:             The right proximal SFA has a 70% stenosis.                     Lesion Details:   There is severe thrombus. Saddle thrombus.     - Profunda Femoral Artery:             The right proximal PFA has a 70% stenosis.                     Lesion Details:   There is severe thrombus. Saddle Thrombus    Bedside echo done by me using contrast: EF 30-35%, RV normal function. Mid-anteroseptum akinetic, mid to distal inferospetum akinetic, apex akinetic and distal anterolateral wall is akinetic, LV apex appears aneurysmal.  No thrombus noted.

## 2018-06-18 NOTE — SUBJECTIVE & OBJECTIVE
Past Medical History:   Diagnosis Date    Diabetes mellitus     Hypertension        Past Surgical History:   Procedure Laterality Date    HERNIA REPAIR         Review of patient's allergies indicates:  No Known Allergies    No current facility-administered medications on file prior to encounter.      No current outpatient prescriptions on file prior to encounter.     Family History     None        Social History Main Topics    Smoking status: Never Smoker    Smokeless tobacco: Current User     Types: Snuff    Alcohol use No      Comment: occasion    Drug use: No    Sexual activity: Yes     Review of Systems   All other systems reviewed and are negative.    Objective:     Vital Signs (Most Recent):  Temp: 97.6 °F (36.4 °C) (06/17/18 2100)  Pulse: 102 (06/17/18 2322)  Resp: (!) 21 (06/17/18 2322)  BP: 132/83 (06/17/18 2100)  SpO2: 99 % (06/17/18 2322) Vital Signs (24h Range):  Temp:  [97.6 °F (36.4 °C)-98.8 °F (37.1 °C)] 97.6 °F (36.4 °C)  Pulse:  [100-130] 102  Resp:  [16-33] 21  SpO2:  [97 %-100 %] 99 %  BP: (105-140)/(62-85) 132/83     Weight: 89.4 kg (197 lb 1.5 oz)  Body mass index is 29.97 kg/m².    SpO2: 99 %  O2 Device (Oxygen Therapy): nasal cannula      Intake/Output Summary (Last 24 hours) at 06/17/18 2331  Last data filed at 06/17/18 1900   Gross per 24 hour   Intake           633.87 ml   Output              300 ml   Net           333.87 ml       Lines/Drains/Airways     Peripheral Intravenous Line                 Peripheral IV - Single Lumen 06/17/18 1322 Left Antecubital less than 1 day         Peripheral IV - Single Lumen 06/17/18 1323 Right Antecubital less than 1 day                Physical Exam   Constitutional: He is oriented to person, place, and time. He appears well-developed and well-nourished. No distress.   HENT:   Head: Normocephalic and atraumatic.   Eyes: No scleral icterus.   Cardiovascular: Regular rhythm and normal heart sounds.    Tachycardic    Pulmonary/Chest: Effort normal  and breath sounds normal. No respiratory distress. He has no wheezes. He has no rales.   Musculoskeletal: He exhibits no edema.   Neurological: He is alert and oriented to person, place, and time.   Skin: Skin is warm.       Significant Labs: All pertinent lab results from the last 24 hours have been reviewed.    Significant Imaging: Sinus tachycardia 101 bpm  BERNA in V3-V4, with PVCs

## 2018-06-18 NOTE — PLAN OF CARE
06/18/18 1059   Discharge Assessment   Assessment Type Discharge Planning Assessment   Confirmed/corrected address and phone number on facesheet? Yes   Assessment information obtained from? Patient;Medical Record   Expected Length of Stay (days) 3   Communicated expected length of stay with patient/caregiver yes   Prior to hospitilization cognitive status: Alert/Oriented   Prior to hospitalization functional status: Independent   Current cognitive status: Alert/Oriented   Current Functional Status: Independent   Facility Arrived From: [facility]   Lives With spouse;child(alyse), dependent   Able to Return to Prior Arrangements yes   Is patient able to care for self after discharge? Yes   Patient's perception of discharge disposition home or selfcare   Readmission Within The Last 30 Days no previous admission in last 30 days   Patient currently being followed by outpatient case management? No   Patient currently receives any other outside agency services? No   Equipment Currently Used at Home none   Do you have any problems affording any of your prescribed medications? TBD   Is the patient taking medications as prescribed? yes   Does the patient have transportation home? Yes   Transportation Available family or friend will provide   Does the patient receive services at the [facility]? No   Discharge Plan A Home with family   Patient/Family In Agreement With Plan yes   Transferred from [facility] with NSTEMI. Lives with wife and children and is independent in his ADLs. Plan is to DC home. No DC needs identified.

## 2018-06-18 NOTE — HOSPITAL COURSE
Admitted with STEMI after presenting with chest pain. LHC done on day of admission revealed triple vessel disease and a clot in the common femoral artery. Patient was started on anticoagulation. No angioplasty was done and CTS was consulted for CABG, who requested viability test.  Viability test revealed transient defect of the inferior wall, correlating with viable myocardium. Repeat 2D echo with EF of 28%. On 6/22, patient underwent  PCI and had RYLIE placed to RCA and LAD without any complication.  He was stable for discharge home on 6/23 without CP or SOB.

## 2018-06-18 NOTE — H&P
[facility]  Cardiology  History and Physical     Patient Name: Kathy Vinson  MRN: [id number]  Admission Date: 6/17/2018  Code Status: Full Code   Attending Provider: Paul Saravia MD   Primary Care Physician: Melida Martinez MD  Principal Problem:<principal problem not specified>    Patient information was obtained from patient and ER records.  I have reviewed the Fellow's note in its entirety, interviewed and examined the patient and agree with findings.    Subjective:     Chief Complaint:  NSTEMi     HPI:  42-year-old gentleman admitted to CCU with NSTEMi, triple vessel disease.    Past medical history of hypertension, diabetes presents with mild to moderate midsternal chest discomfort that radiates to the left arm.  It occurs intermittently ×1 week, usually after meals lasting 1-2 hours.  He initially noted it when he was eating.  Since yesterday chest discomfort at rest while sleeping and continuing into today morning. Today at [facility] the CP continued and concerned him enough to come into the emergency department.  Denies nausea, vomiting, shortness of breath, lightheadedness.    Cardiac cath today   Left Main Coronary Artery:             The LM has luminal irregularities. There is RAF 3 flow.     - Left Anterior Descending Artery:             The proximal LAD is occluded (100). There is RAF 0 flow. Collateral filling from right coronary artery.     - Left Circumflex Artery:             The LCX has a 90% stenosis. There is RAF 3 flow.     - Right Coronary Artery:             The RCA has a 90% stenosis. There is RAF 3 flow.     - Common Femoral Artery:             The right CFA is normal.     - Superficial Femoral Artery:             The right proximal SFA has a 70% stenosis.                     Lesion Details:   There is severe thrombus. Saddle thrombus.     - Profunda Femoral Artery:             The right proximal PFA has a 70% stenosis.                     Lesion Details:   There is  severe thrombus. Saddle Thrombus    Bedside echo done by me using contrast: EF 30-35%, RV normal function. Mid-anteroseptum akinetic, mid to distal inferospetum akinetic, apex akinetic and distal anterolateral wall is akinetic, LV apex appears aneurysmal.  No thrombus noted.    Past Medical History:   Diagnosis Date    Diabetes mellitus     Hypertension        Past Surgical History:   Procedure Laterality Date    HERNIA REPAIR         Review of patient's allergies indicates:  No Known Allergies    No current facility-administered medications on file prior to encounter.      No current outpatient prescriptions on file prior to encounter.     Family History     None        Social History Main Topics    Smoking status: Never Smoker    Smokeless tobacco: Current User     Types: Snuff    Alcohol use No      Comment: occasion    Drug use: No    Sexual activity: Yes     Review of Systems   All other systems reviewed and are negative.    Objective:     Vital Signs (Most Recent):  Temp: 97.6 °F (36.4 °C) (06/17/18 2100)  Pulse: 102 (06/17/18 2322)  Resp: (!) 21 (06/17/18 2322)  BP: 132/83 (06/17/18 2100)  SpO2: 99 % (06/17/18 2322) Vital Signs (24h Range):  Temp:  [97.6 °F (36.4 °C)-98.8 °F (37.1 °C)] 97.6 °F (36.4 °C)  Pulse:  [100-130] 102  Resp:  [16-33] 21  SpO2:  [97 %-100 %] 99 %  BP: (105-140)/(62-85) 132/83     Weight: 89.4 kg (197 lb 1.5 oz)  Body mass index is 29.97 kg/m².    SpO2: 99 %  O2 Device (Oxygen Therapy): nasal cannula      Intake/Output Summary (Last 24 hours) at 06/17/18 2331  Last data filed at 06/17/18 1900   Gross per 24 hour   Intake           633.87 ml   Output              300 ml   Net           333.87 ml       Lines/Drains/Airways     Peripheral Intravenous Line                 Peripheral IV - Single Lumen 06/17/18 1322 Left Antecubital less than 1 day         Peripheral IV - Single Lumen 06/17/18 1323 Right Antecubital less than 1 day                Physical Exam   Constitutional: He is  oriented to person, place, and time. He appears well-developed and well-nourished. No distress.   HENT:   Head: Normocephalic and atraumatic.   Eyes: No scleral icterus.   Cardiovascular: Regular rhythm and normal heart sounds.    Tachycardic    Pulmonary/Chest: Effort normal and breath sounds normal. No respiratory distress. He has no wheezes. He has no rales.   Musculoskeletal: He exhibits no edema.   Neurological: He is alert and oriented to person, place, and time.   Skin: Skin is warm.   Abdomen: Non-tender, normoactive BS, no hepatosplenomegaly    Significant Labs: All pertinent lab results from the last 24 hours have been reviewed.    Significant Imaging: Sinus tachycardia 101 bpm  BERNA in V3-V4, with PVCs  Poor R-wave progression    Assessment and Plan:     NSTEMI (non-ST elevated myocardial infarction)    41 y/o M with NSTEMi, triple vessel disease on cardiac cath including proximal LAD. On medical management, heparin, statin, ASA. Wall motion abnormalities noted on Echo, as well as poor R-wave progression on ECG.     -ASA 81mg   -High dose lipitor   -Heparin Gtt   -troponin trend   -Lopressor 12.5mg Q12h  -Lisinopril 2.5mg daily   -2D echo with color flow  -CTS consult     Plan:  1. Cardiovascular: as above  2. Respiratory: no issues   3. Renal: trend creatinine   4. Neurology: no issues   5. Gi/Nutrition: cardiac diet   6. Hematology: no issues   7. Infectious disease: no issues   8. Muscleskeletal: no issues   9. Endocrine: POCT glucose Q6h and moderate dose sliding scale   10. Prophylaxis: On system AC              VTE Risk Mitigation         Ordered     heparin 25,000 units in dextrose 5% 250 mL (100 units/mL) bolus from bag; ADDITIONAL PRN BOLUS  As needed (PRN)      06/17/18 2225     heparin 25,000 units in dextrose 5% 250 mL (100 units/mL) bolus from bag; ADDITIONAL PRN BOLUS  As needed (PRN)      06/17/18 2205     IP VTE LOW RISK PATIENT  Once      06/17/18 2202     heparin 25,000 units in dextrose  5% 250 mL (100 units/mL) infusion (heparin infusion)  Continuous      06/17/18 1924          Zeina Escalona MD  Cardiology   [facility]

## 2018-06-19 LAB
ANION GAP SERPL CALC-SCNC: 8 MMOL/L
BASOPHILS # BLD AUTO: 0.05 K/UL
BASOPHILS NFR BLD: 0.7 %
BUN SERPL-MCNC: 5 MG/DL
CALCIUM SERPL-MCNC: 9 MG/DL
CHLORIDE SERPL-SCNC: 104 MMOL/L
CO2 SERPL-SCNC: 25 MMOL/L
CREAT SERPL-MCNC: 0.8 MG/DL
DIFFERENTIAL METHOD: ABNORMAL
EOSINOPHIL # BLD AUTO: 0.1 K/UL
EOSINOPHIL NFR BLD: 2.1 %
ERYTHROCYTE [DISTWIDTH] IN BLOOD BY AUTOMATED COUNT: 12.3 %
EST. GFR  (AFRICAN AMERICAN): >60 ML/MIN/1.73 M^2
EST. GFR  (NON AFRICAN AMERICAN): >60 ML/MIN/1.73 M^2
FACT X PPP CHRO-ACNC: 0.2 IU/ML
FACT X PPP CHRO-ACNC: 0.27 IU/ML
FACT X PPP CHRO-ACNC: 1.64 IU/ML
GLUCOSE SERPL-MCNC: 187 MG/DL
HCT VFR BLD AUTO: 34.2 %
HGB BLD-MCNC: 11.2 G/DL
IMM GRANULOCYTES # BLD AUTO: 0.02 K/UL
IMM GRANULOCYTES NFR BLD AUTO: 0.3 %
LYMPHOCYTES # BLD AUTO: 2.3 K/UL
LYMPHOCYTES NFR BLD: 33.6 %
MAGNESIUM SERPL-MCNC: 1.7 MG/DL
MCH RBC QN AUTO: 28.2 PG
MCHC RBC AUTO-ENTMCNC: 32.7 G/DL
MCV RBC AUTO: 86 FL
MITRAL VALVE REGURGITATION: ABNORMAL
MONOCYTES # BLD AUTO: 0.7 K/UL
MONOCYTES NFR BLD: 10.6 %
NEUTROPHILS # BLD AUTO: 3.6 K/UL
NEUTROPHILS NFR BLD: 52.7 %
NRBC BLD-RTO: 0 /100 WBC
PLATELET # BLD AUTO: 305 K/UL
PMV BLD AUTO: 10.5 FL
POCT GLUCOSE: 267 MG/DL (ref 70–110)
POCT GLUCOSE: 273 MG/DL (ref 70–110)
POCT GLUCOSE: 283 MG/DL (ref 70–110)
POCT GLUCOSE: 304 MG/DL (ref 70–110)
POTASSIUM SERPL-SCNC: 3.7 MMOL/L
RBC # BLD AUTO: 3.97 M/UL
RETIRED EF AND QEF - SEE NOTES: 28 (ref 55–65)
RETIRED EF AND QEF - SEE NOTES: 28 (ref 55–65)
SODIUM SERPL-SCNC: 137 MMOL/L
TROPONIN I SERPL DL<=0.01 NG/ML-MCNC: 4.19 NG/ML
TROPONIN I SERPL DL<=0.01 NG/ML-MCNC: 4.63 NG/ML
TROPONIN I SERPL DL<=0.01 NG/ML-MCNC: 5.83 NG/ML
WBC # BLD AUTO: 6.82 K/UL

## 2018-06-19 PROCEDURE — 84484 ASSAY OF TROPONIN QUANT: CPT

## 2018-06-19 PROCEDURE — 84484 ASSAY OF TROPONIN QUANT: CPT | Mod: 91

## 2018-06-19 PROCEDURE — 27000221 HC OXYGEN, UP TO 24 HOURS

## 2018-06-19 PROCEDURE — 25000003 PHARM REV CODE 250: Performed by: STUDENT IN AN ORGANIZED HEALTH CARE EDUCATION/TRAINING PROGRAM

## 2018-06-19 PROCEDURE — 83735 ASSAY OF MAGNESIUM: CPT

## 2018-06-19 PROCEDURE — 85520 HEPARIN ASSAY: CPT | Mod: 91

## 2018-06-19 PROCEDURE — 20000000 HC ICU ROOM

## 2018-06-19 PROCEDURE — 85520 HEPARIN ASSAY: CPT

## 2018-06-19 PROCEDURE — 99232 SBSQ HOSP IP/OBS MODERATE 35: CPT | Mod: ,,, | Performed by: INTERNAL MEDICINE

## 2018-06-19 PROCEDURE — 63600175 PHARM REV CODE 636 W HCPCS: Performed by: INTERNAL MEDICINE

## 2018-06-19 PROCEDURE — 99233 SBSQ HOSP IP/OBS HIGH 50: CPT | Mod: ,,, | Performed by: THORACIC SURGERY (CARDIOTHORACIC VASCULAR SURGERY)

## 2018-06-19 PROCEDURE — 25000003 PHARM REV CODE 250: Performed by: INTERNAL MEDICINE

## 2018-06-19 PROCEDURE — 63600175 PHARM REV CODE 636 W HCPCS: Performed by: STUDENT IN AN ORGANIZED HEALTH CARE EDUCATION/TRAINING PROGRAM

## 2018-06-19 PROCEDURE — 85025 COMPLETE CBC W/AUTO DIFF WBC: CPT

## 2018-06-19 PROCEDURE — 94761 N-INVAS EAR/PLS OXIMETRY MLT: CPT

## 2018-06-19 PROCEDURE — 80048 BASIC METABOLIC PNL TOTAL CA: CPT

## 2018-06-19 RX ORDER — METOPROLOL TARTRATE 50 MG/1
50 TABLET ORAL 2 TIMES DAILY
Status: DISCONTINUED | OUTPATIENT
Start: 2018-06-19 | End: 2018-06-23 | Stop reason: HOSPADM

## 2018-06-19 RX ORDER — MAGNESIUM SULFATE HEPTAHYDRATE 40 MG/ML
2 INJECTION, SOLUTION INTRAVENOUS ONCE
Status: COMPLETED | OUTPATIENT
Start: 2018-06-19 | End: 2018-06-19

## 2018-06-19 RX ORDER — FUROSEMIDE 10 MG/ML
20 INJECTION INTRAMUSCULAR; INTRAVENOUS ONCE
Status: COMPLETED | OUTPATIENT
Start: 2018-06-19 | End: 2018-06-19

## 2018-06-19 RX ORDER — POTASSIUM CHLORIDE 20 MEQ/1
40 TABLET, EXTENDED RELEASE ORAL ONCE
Status: COMPLETED | OUTPATIENT
Start: 2018-06-19 | End: 2018-06-19

## 2018-06-19 RX ADMIN — SODIUM CHLORIDE: 0.9 INJECTION, SOLUTION INTRAVENOUS at 05:06

## 2018-06-19 RX ADMIN — LISINOPRIL 2.5 MG: 2.5 TABLET ORAL at 08:06

## 2018-06-19 RX ADMIN — INSULIN DETEMIR 9 UNITS: 100 INJECTION, SOLUTION SUBCUTANEOUS at 08:06

## 2018-06-19 RX ADMIN — FUROSEMIDE 20 MG: 10 INJECTION, SOLUTION INTRAVENOUS at 11:06

## 2018-06-19 RX ADMIN — POTASSIUM CHLORIDE 40 MEQ: 20 TABLET, EXTENDED RELEASE ORAL at 04:06

## 2018-06-19 RX ADMIN — INSULIN ASPART 2 UNITS: 100 INJECTION, SOLUTION INTRAVENOUS; SUBCUTANEOUS at 09:06

## 2018-06-19 RX ADMIN — INSULIN ASPART 3 UNITS: 100 INJECTION, SOLUTION INTRAVENOUS; SUBCUTANEOUS at 05:06

## 2018-06-19 RX ADMIN — MAGNESIUM SULFATE IN WATER 2 G: 40 INJECTION, SOLUTION INTRAVENOUS at 04:06

## 2018-06-19 RX ADMIN — INSULIN ASPART 3 UNITS: 100 INJECTION, SOLUTION INTRAVENOUS; SUBCUTANEOUS at 07:06

## 2018-06-19 RX ADMIN — METOPROLOL TARTRATE 50 MG: 50 TABLET, FILM COATED ORAL at 08:06

## 2018-06-19 RX ADMIN — ASPIRIN 81 MG CHEWABLE TABLET 81 MG: 81 TABLET CHEWABLE at 08:06

## 2018-06-19 RX ADMIN — HEPARIN SODIUM AND DEXTROSE 18 UNITS/KG/HR: 10000; 5 INJECTION INTRAVENOUS at 10:06

## 2018-06-19 RX ADMIN — METOPROLOL TARTRATE 50 MG: 50 TABLET, FILM COATED ORAL at 09:06

## 2018-06-19 RX ADMIN — INSULIN ASPART 3 UNITS: 100 INJECTION, SOLUTION INTRAVENOUS; SUBCUTANEOUS at 11:06

## 2018-06-19 RX ADMIN — ATORVASTATIN CALCIUM 80 MG: 20 TABLET, FILM COATED ORAL at 09:06

## 2018-06-19 NOTE — PLAN OF CARE
Problem: Patient Care Overview  Goal: Plan of Care Review  Outcome: Ongoing (interventions implemented as appropriate)  Pt remained free of falls or injuries this shift.  Pt remained comfortable and without chest pain throughout shift.  O2 no longer needed, pt sating 98-95 with no supplemental oxygen.  Heparin maintained at 17, labs to be drawn daily as per order.  BP medications given after consulting with MD.  CTS consult still in progress, Pt to be NPO after midnight in preparation for possible emergent stent placement tomorrow depending on CTS decision.  Pt comfortable, VSS, updated to POC.  Will continue to monitor closely

## 2018-06-19 NOTE — ASSESSMENT & PLAN NOTE
43 y/o M with STEMI, triple vessel disease on cardiac cath including proximal LAD. On medical management, heparin, statin, ASA. Wall motion abnormalities noted on Echo, as well as poor R-wave progression on ECG.     -ASA 81mg after loading with ASA on admission.   -High dose lipitor   -continue heparin gtt   -trend troponin   -Lopressor 12.5mg Q12h  -Lisinopril 2.5mg daily   -2D echo with color flow revealed EF 28%  - interventional PCI tommorow  - NPO at midnight     Plan:  1. Cardiovascular: as above  2. Respiratory: no issues   3. Renal: trend creatinine   4. Neurology: no issues   5. Gi/Nutrition: cardiac, diabetic and low sodium diet   6. Hematology: no issues   7. Infectious disease: no issues   8. Muscleskeletal: no issues   9. Endocrine: POCT glucose Q6h and moderate dose sliding scale   10. Prophylaxis: On systemic AC with heparin gtt

## 2018-06-19 NOTE — NURSING
Call to clarify to Dr. Odonnell regarding administering metoprolol with SBP ranging from 90's-110s and to clarify if heparin needs to be held in am. Okay to give metoprolol and do not hold heparin.

## 2018-06-19 NOTE — SUBJECTIVE & OBJECTIVE
Interval History:   NAEON. Doing well this am. Denies any chest pain. Continues on heparin gtt. Plan for PCI tommorow; NPO at midnight.    ROS     Constitutional: no fever or chills  ENT: no nasal congestion or sore throat  Respiratory: no cough or shortness of breath  Cardiovascular: no chest pain or palpitations  Gastrointestinal: no nausea or vomiting, no abdominal pain or abdominal distention   Genitourinary: no hematuria or dysuria  Integument/Breast: no rash or pruritis  Hematologic/Lymphatic: no easy bruising or lymphadenopathy  Musculoskeletal: no arthralgias or myalgias  Neurological: no seizures or tremors  Endocrine: no heat or cold intolerance    Objective:     Vital Signs (Most Recent):  Temp: 98.8 °F (37.1 °C) (06/19/18 0715)  Pulse: 85 (06/19/18 1030)  Resp: 18 (06/19/18 1030)  BP: (!) 105/59 (06/19/18 1030)  SpO2: (!) 94 % (06/19/18 1030) Vital Signs (24h Range):  Temp:  [98.6 °F (37 °C)-99 °F (37.2 °C)] 98.8 °F (37.1 °C)  Pulse:  [] 85  Resp:  [9-40] 18  SpO2:  [91 %-100 %] 94 %  BP: ()/(52-82) 105/59     Weight: 89.4 kg (197 lb 1.5 oz)  Body mass index is 29.97 kg/m².     SpO2: (!) 94 %  O2 Device (Oxygen Therapy): nasal cannula      Intake/Output Summary (Last 24 hours) at 06/19/18 1059  Last data filed at 06/19/18 0600   Gross per 24 hour   Intake          3110.81 ml   Output             1200 ml   Net          1910.81 ml       Lines/Drains/Airways     Peripheral Intravenous Line                 Peripheral IV - Single Lumen 06/17/18 1322 Left Antecubital 1 day         Peripheral IV - Single Lumen 06/17/18 1323 Right Antecubital 1 day                Physical Exam    General: well developed, well nourished, appears to be in NAD  Eyes: conjunctivae/corneas clear. PERRL. EOMI  Neck: supple, symmetrical, trachea midline, no JVD  Cardiovascular: Heart: regular rate and rhythm, S1, S2 normal, no murmur, click, rub or gallop.  Lungs: + bilateral basilar crackles, normal respiratory  effort  Chest Wall: no tenderness  Abdomen/Rectal: Abdomen: Non distended. + BS. No masses. No TTP.   Extremities: no edema, no redness or tenderness in the calves or thighs. Pulses: 2+ and symmetric  Skin: Skin color, texture, turgor normal. No rashes or lesions  Musculoskeletal: full range of motion of joints  Lymph Nodes: No cervical or supraclavicular adenopathy  Psych/Behavioral: Normal. Alert and oriented, appropriate affect.    Significant Labs:   Recent Results (from the past 24 hour(s))   Magnesium    Collection Time: 06/18/18 11:21 AM   Result Value Ref Range    Magnesium 1.6 1.6 - 2.6 mg/dL   Phosphorus    Collection Time: 06/18/18 11:21 AM   Result Value Ref Range    Phosphorus 2.5 (L) 2.7 - 4.5 mg/dL   POCT glucose    Collection Time: 06/18/18 12:30 PM   Result Value Ref Range    POCT Glucose 253 (H) 70 - 110 mg/dL   Troponin I    Collection Time: 06/18/18  2:17 PM   Result Value Ref Range    Troponin I 7.599 (H) 0.000 - 0.026 ng/mL   Troponin I    Collection Time: 06/18/18  5:17 PM   Result Value Ref Range    Troponin I 6.926 (H) 0.000 - 0.026 ng/mL   Hemoglobin A1c    Collection Time: 06/18/18  5:17 PM   Result Value Ref Range    Hemoglobin A1C 12.8 (H) 4.0 - 5.6 %    Estimated Avg Glucose 321 (H) 68 - 131 mg/dL   POCT glucose    Collection Time: 06/18/18  6:27 PM   Result Value Ref Range    POCT Glucose 204 (H) 70 - 110 mg/dL   POCT glucose    Collection Time: 06/18/18  8:45 PM   Result Value Ref Range    POCT Glucose 260 (H) 70 - 110 mg/dL   Troponin I    Collection Time: 06/19/18  5:47 AM   Result Value Ref Range    Troponin I 5.830 (H) 0.000 - 0.026 ng/mL   CBC auto differential    Collection Time: 06/19/18  5:47 AM   Result Value Ref Range    WBC 6.82 3.90 - 12.70 K/uL    RBC 3.97 (L) 4.60 - 6.20 M/uL    Hemoglobin 11.2 (L) 14.0 - 18.0 g/dL    Hematocrit 34.2 (L) 40.0 - 54.0 %    MCV 86 82 - 98 fL    MCH 28.2 27.0 - 31.0 pg    MCHC 32.7 32.0 - 36.0 g/dL    RDW 12.3 11.5 - 14.5 %    Platelets 305  150 - 350 K/uL    MPV 10.5 9.2 - 12.9 fL    Immature Granulocytes 0.3 0.0 - 0.5 %    Gran # (ANC) 3.6 1.8 - 7.7 K/uL    Immature Grans (Abs) 0.02 0.00 - 0.04 K/uL    Lymph # 2.3 1.0 - 4.8 K/uL    Mono # 0.7 0.3 - 1.0 K/uL    Eos # 0.1 0.0 - 0.5 K/uL    Baso # 0.05 0.00 - 0.20 K/uL    nRBC 0 0 /100 WBC    Gran% 52.7 38.0 - 73.0 %    Lymph% 33.6 18.0 - 48.0 %    Mono% 10.6 4.0 - 15.0 %    Eosinophil% 2.1 0.0 - 8.0 %    Basophil% 0.7 0.0 - 1.9 %    Differential Method Automated    Anti-Xa Heparin Monitoring    Collection Time: 06/19/18  5:47 AM   Result Value Ref Range    Heparin Anti-Xa 0.27 (L) 0.30 - 0.70 IU/mL   POCT glucose    Collection Time: 06/19/18  7:20 AM   Result Value Ref Range    POCT Glucose 267 (H) 70 - 110 mg/dL         Significant Imaging:   Obtaining CXR today.

## 2018-06-19 NOTE — PLAN OF CARE
Problem: Patient Care Overview  Goal: Individualization & Mutuality  6/17: Admitted to SICU, 2D Echo, heparin gtt     Nursing: Accucheck AC/HS   Outcome: Ongoing (interventions implemented as appropriate)  Alert and oriented. Remains tachycardic with occasional pvc's. 2l nc throughout night. PIV x 2 to right ac and left ac. Urinal without difficulty. Denies chest pain or sob. NPO since midnight. gtts : heparin and ns. Free from falls during shift.

## 2018-06-19 NOTE — PLAN OF CARE
Problem: Patient Care Overview  Goal: Plan of Care Review  Outcome: Ongoing (interventions implemented as appropriate)  Reviewed plan of care with patient and spouse. Pt AAOx4, moves all extremities and follows commands. Room air. Diabetic diet. Voids per urinal without difficulty. No BM. Free from skin breakdown. Pt independent, up in chair for most of day. See flow sheet for further assessment. VSS at this time, will continue to monitor.

## 2018-06-19 NOTE — NURSING
Contacted Dr. Odonnell regarding anti -Xa results. Order to continue heparin without bolus and increase rate 1 unit/kg

## 2018-06-19 NOTE — PROGRESS NOTES
[facility]  Cardiology  Progress Note    Patient Name: Kathy Vinson  MRN: [id number]  Admission Date: 6/17/2018  Hospital Length of Stay: 2 days  Code Status: Full Code   Attending Physician: Paul Saravia MD   Primary Care Physician: Melida Martinez MD  Expected Discharge Date:   Principal Problem:STEMI (ST elevation myocardial infarction)    Subjective:     Hospital Course:   Admitted with STEMI after presenting with chest pain. LHC done on day of admission revealed triple vessel disease. No angioplasty was done and CTS was consulted for CABG. Repeat 2D echo with EF of 28%.     Interval History:   NAEON. Doing well this am. Denies any chest pain. Continues on heparin gtt. Plan for PCI tommorow; NPO at midnight.    ROS     Constitutional: no fever or chills  ENT: no nasal congestion or sore throat  Respiratory: no cough or shortness of breath  Cardiovascular: no chest pain or palpitations  Gastrointestinal: no nausea or vomiting, no abdominal pain or abdominal distention   Genitourinary: no hematuria or dysuria  Integument/Breast: no rash or pruritis  Hematologic/Lymphatic: no easy bruising or lymphadenopathy  Musculoskeletal: no arthralgias or myalgias  Neurological: no seizures or tremors  Endocrine: no heat or cold intolerance    Objective:     Vital Signs (Most Recent):  Temp: 98.8 °F (37.1 °C) (06/19/18 0715)  Pulse: 85 (06/19/18 1030)  Resp: 18 (06/19/18 1030)  BP: (!) 105/59 (06/19/18 1030)  SpO2: (!) 94 % (06/19/18 1030) Vital Signs (24h Range):  Temp:  [98.6 °F (37 °C)-99 °F (37.2 °C)] 98.8 °F (37.1 °C)  Pulse:  [] 85  Resp:  [9-40] 18  SpO2:  [91 %-100 %] 94 %  BP: ()/(52-82) 105/59     Weight: 89.4 kg (197 lb 1.5 oz)  Body mass index is 29.97 kg/m².     SpO2: (!) 94 %  O2 Device (Oxygen Therapy): nasal cannula      Intake/Output Summary (Last 24 hours) at 06/19/18 1059  Last data filed at 06/19/18 0600   Gross per 24 hour   Intake          3110.81 ml   Output              1200 ml   Net          1910.81 ml       Lines/Drains/Airways     Peripheral Intravenous Line                 Peripheral IV - Single Lumen 06/17/18 1322 Left Antecubital 1 day         Peripheral IV - Single Lumen 06/17/18 1323 Right Antecubital 1 day                Physical Exam    General: well developed, well nourished, appears to be in NAD  Eyes: conjunctivae/corneas clear. PERRL. EOMI  Neck: supple, symmetrical, trachea midline, no JVD  Cardiovascular: Heart: regular rate and rhythm, S1, S2 normal, no murmur, click, rub or gallop.  Lungs: + bilateral basilar crackles, normal respiratory effort  Chest Wall: no tenderness  Abdomen/Rectal: Abdomen: Non distended. + BS. No masses. No TTP.   Extremities: no edema, no redness or tenderness in the calves or thighs. Pulses: 2+ and symmetric  Skin: Skin color, texture, turgor normal. No rashes or lesions  Musculoskeletal: full range of motion of joints  Lymph Nodes: No cervical or supraclavicular adenopathy  Psych/Behavioral: Normal. Alert and oriented, appropriate affect.    Significant Labs:   Recent Results (from the past 24 hour(s))   Magnesium    Collection Time: 06/18/18 11:21 AM   Result Value Ref Range    Magnesium 1.6 1.6 - 2.6 mg/dL   Phosphorus    Collection Time: 06/18/18 11:21 AM   Result Value Ref Range    Phosphorus 2.5 (L) 2.7 - 4.5 mg/dL   POCT glucose    Collection Time: 06/18/18 12:30 PM   Result Value Ref Range    POCT Glucose 253 (H) 70 - 110 mg/dL   Troponin I    Collection Time: 06/18/18  2:17 PM   Result Value Ref Range    Troponin I 7.599 (H) 0.000 - 0.026 ng/mL   Troponin I    Collection Time: 06/18/18  5:17 PM   Result Value Ref Range    Troponin I 6.926 (H) 0.000 - 0.026 ng/mL   Hemoglobin A1c    Collection Time: 06/18/18  5:17 PM   Result Value Ref Range    Hemoglobin A1C 12.8 (H) 4.0 - 5.6 %    Estimated Avg Glucose 321 (H) 68 - 131 mg/dL   POCT glucose    Collection Time: 06/18/18  6:27 PM   Result Value Ref Range    POCT Glucose 204 (H)  70 - 110 mg/dL   POCT glucose    Collection Time: 06/18/18  8:45 PM   Result Value Ref Range    POCT Glucose 260 (H) 70 - 110 mg/dL   Troponin I    Collection Time: 06/19/18  5:47 AM   Result Value Ref Range    Troponin I 5.830 (H) 0.000 - 0.026 ng/mL   CBC auto differential    Collection Time: 06/19/18  5:47 AM   Result Value Ref Range    WBC 6.82 3.90 - 12.70 K/uL    RBC 3.97 (L) 4.60 - 6.20 M/uL    Hemoglobin 11.2 (L) 14.0 - 18.0 g/dL    Hematocrit 34.2 (L) 40.0 - 54.0 %    MCV 86 82 - 98 fL    MCH 28.2 27.0 - 31.0 pg    MCHC 32.7 32.0 - 36.0 g/dL    RDW 12.3 11.5 - 14.5 %    Platelets 305 150 - 350 K/uL    MPV 10.5 9.2 - 12.9 fL    Immature Granulocytes 0.3 0.0 - 0.5 %    Gran # (ANC) 3.6 1.8 - 7.7 K/uL    Immature Grans (Abs) 0.02 0.00 - 0.04 K/uL    Lymph # 2.3 1.0 - 4.8 K/uL    Mono # 0.7 0.3 - 1.0 K/uL    Eos # 0.1 0.0 - 0.5 K/uL    Baso # 0.05 0.00 - 0.20 K/uL    nRBC 0 0 /100 WBC    Gran% 52.7 38.0 - 73.0 %    Lymph% 33.6 18.0 - 48.0 %    Mono% 10.6 4.0 - 15.0 %    Eosinophil% 2.1 0.0 - 8.0 %    Basophil% 0.7 0.0 - 1.9 %    Differential Method Automated    Anti-Xa Heparin Monitoring    Collection Time: 06/19/18  5:47 AM   Result Value Ref Range    Heparin Anti-Xa 0.27 (L) 0.30 - 0.70 IU/mL   POCT glucose    Collection Time: 06/19/18  7:20 AM   Result Value Ref Range    POCT Glucose 267 (H) 70 - 110 mg/dL         Significant Imaging:   Obtaining CXR today.    Assessment and Plan:     * STEMI (ST elevation myocardial infarction)    43 y/o M with STEMI, triple vessel disease on cardiac cath including proximal LAD. On medical management, heparin, statin, ASA. Wall motion abnormalities noted on Echo, as well as poor R-wave progression on ECG.     -ASA 81mg after loading with ASA on admission.   -High dose lipitor   -continue heparin gtt   -trend troponin   -Lopressor 12.5mg Q12h  -Lisinopril 2.5mg daily   -2D echo with color flow revealed EF 28%  - interventional PCI tommorow  - NPO at midnight      Plan:  1. Cardiovascular: as above  2. Respiratory: no issues   3. Renal: trend creatinine   4. Neurology: no issues   5. Gi/Nutrition: cardiac, diabetic and low sodium diet   6. Hematology: no issues   7. Infectious disease: no issues   8. Muscleskeletal: no issues   9. Endocrine: POCT glucose Q6h and moderate dose sliding scale   10. Prophylaxis: On systemic AC with heparin gtt              VTE Risk Mitigation         Ordered     heparin 25,000 units in dextrose 5% 250 mL (100 units/mL) bolus from bag; ADDITIONAL PRN BOLUS  As needed (PRN)      06/17/18 2225     heparin 25,000 units in dextrose 5% 250 mL (100 units/mL) bolus from bag; ADDITIONAL PRN BOLUS  As needed (PRN)      06/17/18 2205     IP VTE LOW RISK PATIENT  Once      06/17/18 2202     heparin 25,000 units in dextrose 5% 250 mL (100 units/mL) infusion (heparin infusion)  Continuous      06/17/18 1924          Umair Beltran MD  Cardiology  [facility]

## 2018-06-20 PROBLEM — E11.65 TYPE 2 DIABETES MELLITUS WITH HYPERGLYCEMIA: Status: ACTIVE | Noted: 2018-06-20

## 2018-06-20 PROBLEM — E11.9 DIABETES: Status: ACTIVE | Noted: 2018-06-20

## 2018-06-20 LAB
ANION GAP SERPL CALC-SCNC: 6 MMOL/L
BASOPHILS # BLD AUTO: 0.04 K/UL
BASOPHILS NFR BLD: 0.6 %
BUN SERPL-MCNC: 5 MG/DL
CALCIUM SERPL-MCNC: 8.4 MG/DL
CHLORIDE SERPL-SCNC: 104 MMOL/L
CO2 SERPL-SCNC: 25 MMOL/L
CREAT SERPL-MCNC: 0.7 MG/DL
DIFFERENTIAL METHOD: ABNORMAL
EOSINOPHIL # BLD AUTO: 0.2 K/UL
EOSINOPHIL NFR BLD: 2.7 %
ERYTHROCYTE [DISTWIDTH] IN BLOOD BY AUTOMATED COUNT: 12.4 %
EST. GFR  (AFRICAN AMERICAN): >60 ML/MIN/1.73 M^2
EST. GFR  (NON AFRICAN AMERICAN): >60 ML/MIN/1.73 M^2
FACT X PPP CHRO-ACNC: 0.21 IU/ML
FACT X PPP CHRO-ACNC: 0.25 IU/ML
FACT X PPP CHRO-ACNC: 0.28 IU/ML
GLUCOSE SERPL-MCNC: 266 MG/DL
HCT VFR BLD AUTO: 32.5 %
HGB BLD-MCNC: 10.8 G/DL
IMM GRANULOCYTES # BLD AUTO: 0.03 K/UL
IMM GRANULOCYTES NFR BLD AUTO: 0.4 %
LYMPHOCYTES # BLD AUTO: 2.7 K/UL
LYMPHOCYTES NFR BLD: 39.2 %
MAGNESIUM SERPL-MCNC: 1.7 MG/DL
MCH RBC QN AUTO: 28.4 PG
MCHC RBC AUTO-ENTMCNC: 33.2 G/DL
MCV RBC AUTO: 86 FL
MONOCYTES # BLD AUTO: 0.7 K/UL
MONOCYTES NFR BLD: 9.9 %
NEUTROPHILS # BLD AUTO: 3.2 K/UL
NEUTROPHILS NFR BLD: 47.2 %
NRBC BLD-RTO: 0 /100 WBC
PHOSPHATE SERPL-MCNC: 2.7 MG/DL
PLATELET # BLD AUTO: 282 K/UL
PMV BLD AUTO: 10.8 FL
POCT GLUCOSE: 222 MG/DL (ref 70–110)
POCT GLUCOSE: 284 MG/DL (ref 70–110)
POCT GLUCOSE: 317 MG/DL (ref 70–110)
POCT GLUCOSE: 328 MG/DL (ref 70–110)
POTASSIUM SERPL-SCNC: 3.9 MMOL/L
RBC # BLD AUTO: 3.8 M/UL
SODIUM SERPL-SCNC: 135 MMOL/L
TROPONIN I SERPL DL<=0.01 NG/ML-MCNC: 3.92 NG/ML
WBC # BLD AUTO: 6.79 K/UL

## 2018-06-20 PROCEDURE — 63600175 PHARM REV CODE 636 W HCPCS: Performed by: INTERNAL MEDICINE

## 2018-06-20 PROCEDURE — 63600175 PHARM REV CODE 636 W HCPCS: Performed by: NURSE PRACTITIONER

## 2018-06-20 PROCEDURE — 84484 ASSAY OF TROPONIN QUANT: CPT

## 2018-06-20 PROCEDURE — 25000003 PHARM REV CODE 250: Performed by: STUDENT IN AN ORGANIZED HEALTH CARE EDUCATION/TRAINING PROGRAM

## 2018-06-20 PROCEDURE — 84100 ASSAY OF PHOSPHORUS: CPT

## 2018-06-20 PROCEDURE — 27000221 HC OXYGEN, UP TO 24 HOURS

## 2018-06-20 PROCEDURE — 20000000 HC ICU ROOM

## 2018-06-20 PROCEDURE — 83735 ASSAY OF MAGNESIUM: CPT

## 2018-06-20 PROCEDURE — 85520 HEPARIN ASSAY: CPT | Mod: 91

## 2018-06-20 PROCEDURE — 25000003 PHARM REV CODE 250: Performed by: INTERNAL MEDICINE

## 2018-06-20 PROCEDURE — 63600175 PHARM REV CODE 636 W HCPCS: Performed by: STUDENT IN AN ORGANIZED HEALTH CARE EDUCATION/TRAINING PROGRAM

## 2018-06-20 PROCEDURE — 80048 BASIC METABOLIC PNL TOTAL CA: CPT

## 2018-06-20 PROCEDURE — 99223 1ST HOSP IP/OBS HIGH 75: CPT | Mod: ,,, | Performed by: NURSE PRACTITIONER

## 2018-06-20 PROCEDURE — 99223 1ST HOSP IP/OBS HIGH 75: CPT | Mod: ,,, | Performed by: INTERNAL MEDICINE

## 2018-06-20 PROCEDURE — 85025 COMPLETE CBC W/AUTO DIFF WBC: CPT

## 2018-06-20 PROCEDURE — 94761 N-INVAS EAR/PLS OXIMETRY MLT: CPT

## 2018-06-20 PROCEDURE — 85520 HEPARIN ASSAY: CPT

## 2018-06-20 RX ORDER — LISINOPRIL 5 MG/1
5 TABLET ORAL DAILY
Status: DISCONTINUED | OUTPATIENT
Start: 2018-06-21 | End: 2018-06-21

## 2018-06-20 RX ORDER — CLOPIDOGREL 300 MG/1
300 TABLET, FILM COATED ORAL ONCE
Status: COMPLETED | OUTPATIENT
Start: 2018-06-20 | End: 2018-06-20

## 2018-06-20 RX ORDER — FUROSEMIDE 20 MG/1
20 TABLET ORAL DAILY
Status: DISCONTINUED | OUTPATIENT
Start: 2018-06-21 | End: 2018-06-23 | Stop reason: HOSPADM

## 2018-06-20 RX ORDER — MAGNESIUM SULFATE HEPTAHYDRATE 40 MG/ML
2 INJECTION, SOLUTION INTRAVENOUS ONCE
Status: COMPLETED | OUTPATIENT
Start: 2018-06-20 | End: 2018-06-20

## 2018-06-20 RX ORDER — FUROSEMIDE 10 MG/ML
40 INJECTION INTRAMUSCULAR; INTRAVENOUS ONCE
Status: COMPLETED | OUTPATIENT
Start: 2018-06-20 | End: 2018-06-20

## 2018-06-20 RX ORDER — LISINOPRIL 2.5 MG/1
2.5 TABLET ORAL ONCE
Status: COMPLETED | OUTPATIENT
Start: 2018-06-20 | End: 2018-06-20

## 2018-06-20 RX ORDER — IBUPROFEN 200 MG
24 TABLET ORAL
Status: DISCONTINUED | OUTPATIENT
Start: 2018-06-20 | End: 2018-06-22

## 2018-06-20 RX ORDER — INSULIN ASPART 100 [IU]/ML
4 INJECTION, SOLUTION INTRAVENOUS; SUBCUTANEOUS
Status: DISCONTINUED | OUTPATIENT
Start: 2018-06-21 | End: 2018-06-21

## 2018-06-20 RX ORDER — CLOPIDOGREL BISULFATE 75 MG/1
75 TABLET ORAL DAILY
Status: DISCONTINUED | OUTPATIENT
Start: 2018-06-21 | End: 2018-06-23 | Stop reason: HOSPADM

## 2018-06-20 RX ORDER — INSULIN ASPART 100 [IU]/ML
1-10 INJECTION, SOLUTION INTRAVENOUS; SUBCUTANEOUS
Status: DISCONTINUED | OUTPATIENT
Start: 2018-06-20 | End: 2018-06-22

## 2018-06-20 RX ORDER — IBUPROFEN 200 MG
16 TABLET ORAL
Status: DISCONTINUED | OUTPATIENT
Start: 2018-06-20 | End: 2018-06-22

## 2018-06-20 RX ORDER — GLUCAGON 1 MG
1 KIT INJECTION
Status: DISCONTINUED | OUTPATIENT
Start: 2018-06-20 | End: 2018-06-22

## 2018-06-20 RX ADMIN — FUROSEMIDE 40 MG: 10 INJECTION, SOLUTION INTRAMUSCULAR; INTRAVENOUS at 11:06

## 2018-06-20 RX ADMIN — SPIRONOLACTONE 12.5 MG: 25 TABLET, FILM COATED ORAL at 11:06

## 2018-06-20 RX ADMIN — INSULIN ASPART 4 UNITS: 100 INJECTION, SOLUTION INTRAVENOUS; SUBCUTANEOUS at 07:06

## 2018-06-20 RX ADMIN — INSULIN DETEMIR 20 UNITS: 100 INJECTION, SOLUTION SUBCUTANEOUS at 09:06

## 2018-06-20 RX ADMIN — INSULIN DETEMIR 9 UNITS: 100 INJECTION, SOLUTION SUBCUTANEOUS at 08:06

## 2018-06-20 RX ADMIN — HEPARIN SODIUM AND DEXTROSE 17 UNITS/KG/HR: 10000; 5 INJECTION INTRAVENOUS at 04:06

## 2018-06-20 RX ADMIN — ASPIRIN 81 MG CHEWABLE TABLET 81 MG: 81 TABLET CHEWABLE at 08:06

## 2018-06-20 RX ADMIN — HEPARIN SODIUM AND DEXTROSE 15 UNITS/KG/HR: 10000; 5 INJECTION INTRAVENOUS at 10:06

## 2018-06-20 RX ADMIN — ATORVASTATIN CALCIUM 80 MG: 20 TABLET, FILM COATED ORAL at 09:06

## 2018-06-20 RX ADMIN — HEPARIN SODIUM AND DEXTROSE 21 UNITS/KG/HR: 10000; 5 INJECTION INTRAVENOUS at 05:06

## 2018-06-20 RX ADMIN — INSULIN ASPART 3 UNITS: 100 INJECTION, SOLUTION INTRAVENOUS; SUBCUTANEOUS at 11:06

## 2018-06-20 RX ADMIN — METOPROLOL TARTRATE 50 MG: 50 TABLET, FILM COATED ORAL at 09:06

## 2018-06-20 RX ADMIN — CLOPIDOGREL BISULFATE 300 MG: 300 TABLET, FILM COATED ORAL at 11:06

## 2018-06-20 RX ADMIN — LISINOPRIL 2.5 MG: 2.5 TABLET ORAL at 11:06

## 2018-06-20 RX ADMIN — METOPROLOL TARTRATE 50 MG: 50 TABLET, FILM COATED ORAL at 08:06

## 2018-06-20 RX ADMIN — INSULIN ASPART 8 UNITS: 100 INJECTION, SOLUTION INTRAVENOUS; SUBCUTANEOUS at 06:06

## 2018-06-20 RX ADMIN — INSULIN ASPART 2 UNITS: 100 INJECTION, SOLUTION INTRAVENOUS; SUBCUTANEOUS at 11:06

## 2018-06-20 RX ADMIN — MAGNESIUM SULFATE IN WATER 2 G: 40 INJECTION, SOLUTION INTRAVENOUS at 08:06

## 2018-06-20 RX ADMIN — SODIUM CHLORIDE: 0.9 INJECTION, SOLUTION INTRAVENOUS at 04:06

## 2018-06-20 RX ADMIN — POTASSIUM BICARBONATE 50 MEQ: 25 TABLET, EFFERVESCENT ORAL at 08:06

## 2018-06-20 RX ADMIN — LISINOPRIL 2.5 MG: 2.5 TABLET ORAL at 08:06

## 2018-06-20 RX ADMIN — HEPARIN SODIUM AND DEXTROSE 23 UNITS/KG/HR: 10000; 5 INJECTION INTRAVENOUS at 10:06

## 2018-06-20 NOTE — PLAN OF CARE
Problem: Patient Care Overview  Goal: Plan of Care Review  Outcome: Ongoing (interventions implemented as appropriate)  Reviewed plan of care with patient and spouse. Pt AAOx4, moves all extremities and follows commands. Room air. Heparin infusing and titrated per algorithm. UO adequate, voids per urinal without difficulty. Diabetic diet, tolerating well. Pt independent, turns and repositions self. Free from skin breakdown. See flow sheet for further assessment, VSS at this time will continue to monitor.

## 2018-06-20 NOTE — ASSESSMENT & PLAN NOTE
Bg goal 140-180    Increase Levemir to 20 units nightly, first dose tonight  Start Novolog 4 units with meals if eating >25%.   BG monitoring before dinner and at bedtime then change to q6 hours with moderate dose correction scale.     Discharge planning- TBD; will diabetes education - not consistently taking insulin.

## 2018-06-20 NOTE — PLAN OF CARE
Problem: Patient Care Overview  Goal: Plan of Care Review  Outcome: Ongoing (interventions implemented as appropriate)  Pt remains free from falls and injuries. Heparin gtt infusing and adjusted per protocol. Blood glucose monitored as ordered; supplemental insulin given as ordered. Pt repositioned independently. Plan of care discussed with pt and family. VSS, no acute issues overnight.

## 2018-06-20 NOTE — ASSESSMENT & PLAN NOTE
41 y/o M with STEMI, triple vessel disease on cardiac cath including proximal LAD. On medical management, heparin, statin, ASA. Wall motion abnormalities noted on Echo, as well as poor R-wave progression on ECG.     -ASA 81mg after loading with ASA on admission.   -High dose lipitor   - added plavix loaded; will continue christos   -continue heparin gtt   -Lopressor 12.5mg Q12h  - increase Lisinopril 5mg daily   -2D echo with color flow revealed EF 28%  - lasix IV 40mg x 1 dose  - PO lasix 20mg start christos   - NM scan today       Plan:  1. Cardiovascular: as above  2. Respiratory: no issues   3. Renal: trend creatinine   4. Neurology: no issues   5. Gi/Nutrition: cardiac, diabetic and low sodium diet   6. Hematology: no issues   7. Infectious disease: no issues   8. Muscleskeletal: no issues   9. Endocrine: POCT glucose Q6h and moderate dose sliding scale   10. Prophylaxis: On systemic AC with heparin gtt

## 2018-06-20 NOTE — PLAN OF CARE
Problem: Spiritual Distress, Risk/Actual (Adult,Obstetrics,Pediatric)  Intervention: Facilitate Personal Exploration/Expression of Spirituality  Provided initial visit. Pt and family present. Introduced and offered pastoral support with prayer upon request. No further needs expressed at this time. Pt and family made aware of 's presence as needed.

## 2018-06-20 NOTE — PROGRESS NOTES
[facility]  Adult Nutrition  Progress Note    Per policy, RD to provide diabetic diet education to patients with A1c > 9.0. Patient with A1c 12.8 but NPO and not appropriate for education at this time. RD will follow-up.

## 2018-06-20 NOTE — CONSULTS
[facility]  Endocrinology  Diabetes Consult Note    Consult Requested by: Paul Saravia MD   Reason for admit: STEMI (ST elevation myocardial infarction)    HISTORY OF PRESENT ILLNESS:  Reason for Consult: Management of type 2 DM, Hyperglycemia     Surgical Procedure and Date: n/a    Diabetes diagnosis year:     Home Diabetes Medications:  Toujeo 30-36 units daily; metformin 1000 mg BID  Lab Results   Component Value Date    HGBA1C 12.8 (H) 2018       How often checking glucose at home? Once; would occasionally not check bg if not feeling well   BG readings on regimen: 140-200s   Hypoglycemia on the regimen?  previously but rare  Missed doses on regimen?  Yes, about 2 times a week     Diabetes Complications include:     Hyperglycemia, Diabetic retinopathy  and Diabetic peripheral neuropathy     Complicating diabetes co morbidities:   History of MI      HPI:   Patient is a 42 y.o. male with a diagnosis of type 2 DM managed with metformin and basal insulin. Also with HTN. Patient presents with mild to moderate midsternal chest discomfort that radiates to the left arm.  It occurs intermittently ×1 week, usually after meals lasting 1-2 hours.  He initially noted it when he was eating.  Also with chest discomfort at rest while sleeping and continuing into today morning. Patient presented to ED with persistent chest pain. Endocrinology consulted for BG and DM management.     Interval HPI:   Overnight events:  BG globally elevated since admit with basal and correction scale coverage. Nuclear medicine scan today per cardiology note.   Eatin%; has been NPO intermittently for tests; will be NPO again after midnight.   Nausea: No  Hypoglycemia and intervention: No  Fever: No  TPN and/or TF: No    PMH, PSH, FH, SH  reviewed     Review of Systems   Constitutional: Negative for weight changes.  Eyes: + for visual disturbance.  Respiratory: Negative for cough.   Cardiovascular: Negative  for chest pain.  Gastrointestinal: Negative for nausea.  Endocrine: Negative for polyuria, polydipsia.  Musculoskeletal: Negative for back pain.  Skin: Negative for rash.  Neurological: Negative for syncope.  Psychiatric/Behavioral: Negative for depression.      Hyperglycemia/Diabetes Medications:   Antihyperglycemics     Start     Stop Route Frequency Ordered    06/21/18 0900  insulin detemir U-100 pen 13 Units      -- SubQ Daily 06/20/18 1002    06/18/18 1009  insulin aspart U-100 pen 0-5 Units      -- SubQ Before meals & nightly PRN 06/18/18 0910             PHYSICAL EXAMINATION:  Vitals:    06/20/18 1500   BP: 109/62   Pulse: 86   Resp: (!) 36   Temp:      Body mass index is 29.97 kg/m².    Physical Exam   Constitutional:  Well developed, well nourished, NAD.  ENT: External ears no masses with nose patent; normal hearing.   Neck:  Supple; trachea midline.   Cardiovascular: Normal heart sounds, no LE edema.     Lungs:  Normal effort; lungs anterior bilaterally clear to auscultation.  Abdomen:  Soft, no masses,  no hernias.  MS: No clubbing or cyanosis of nails noted;unable to assess gait.  Skin: No rashes, lesions, or ulcers; no nodules.  Psychiatric: Good judgement and insight; normal mood and affect.  Neurological: Cranial nerves are grossly intact.         Labs Reviewed and Include     Recent Labs  Lab 06/20/18  0440   *   CALCIUM 8.4*   *   K 3.9   CO2 25      BUN 5*   CREATININE 0.7     Lab Results   Component Value Date    WBC 6.79 06/20/2018    HGB 10.8 (L) 06/20/2018    HCT 32.5 (L) 06/20/2018    MCV 86 06/20/2018     06/20/2018     No results for input(s): TSH, FREET4 in the last 168 hours.  Lab Results   Component Value Date    HGBA1C 12.8 (H) 06/18/2018       Nutritional status:   Body mass index is 29.97 kg/m².  Lab Results   Component Value Date    ALBUMIN 2.8 (L) 06/18/2018    ALBUMIN 3.0 (L) 06/17/2018    ALBUMIN 3.4 (L) 06/17/2018     No results found for:  PREALBUMIN    Estimated Creatinine Clearance: 146.3 mL/min (based on SCr of 0.7 mg/dL).    Accu-Checks  Recent Labs      06/18/18   0748  06/18/18   1230  06/18/18   1827  06/18/18   2045  06/19/18   0720  06/19/18   1108  06/19/18   1708  06/19/18   2129  06/20/18   0724  06/20/18   1109   POCTGLUCOSE  273*  253*  204*  260*  267*  273*  283*  304*  317*  284*        ASSESSMENT and PLAN    * STEMI (ST elevation myocardial infarction)    Managed per primary.   Avoid hypoglycemia.         Type 2 diabetes mellitus with hyperglycemia    Bg goal 140-180    Increase Levemir to 20 units nightly, first dose tonight  Start Novolog 4 units with meals if eating >25%.   BG monitoring before dinner and at bedtime then change to q6 hours with moderate dose correction scale.     Discharge planning- TBD; will diabetes education - not consistently taking insulin.           HTN (hypertension)    Managed per primary.  If uncontrolled may increase insulin resistance.             Plan discussed with patient and family at bedside.     Celeste Kerns NP  Endocrinology  [facility]

## 2018-06-20 NOTE — SUBJECTIVE & OBJECTIVE
Interval History:   Patient reports mild SOB denies CP. Plan for NM exam today. Continue heparin ggt. Added plavix. Diuresis lasix 40IV.     ROS     Constitutional: no fever or chills  ENT: no nasal congestion or sore throat  Respiratory: no cough or shortness of breath  Cardiovascular: no chest pain or palpitations  Gastrointestinal: no nausea or vomiting, no abdominal pain or abdominal distention   Genitourinary: no hematuria or dysuria  Integument/Breast: no rash or pruritis  Hematologic/Lymphatic: no easy bruising or lymphadenopathy  Musculoskeletal: no arthralgias or myalgias  Neurological: no seizures or tremors  Endocrine: no heat or cold intolerance    Objective:     Vital Signs (Most Recent):  Temp: 99 °F (37.2 °C) (06/20/18 0400)  Pulse: 86 (06/20/18 1316)  Resp: (!) 24 (06/20/18 1316)  BP: 111/69 (06/20/18 1104)  SpO2: 98 % (06/20/18 1316) Vital Signs (24h Range):  Temp:  [98.6 °F (37 °C)-99.8 °F (37.7 °C)] 99 °F (37.2 °C)  Pulse:  [] 86  Resp:  [19-35] 24  SpO2:  [91 %-100 %] 98 %  BP: ()/(57-77) 111/69     Weight: 89.4 kg (197 lb 1.5 oz)  Body mass index is 29.97 kg/m².     SpO2: 98 %  O2 Device (Oxygen Therapy): nasal cannula      Intake/Output Summary (Last 24 hours) at 06/20/18 1405  Last data filed at 06/20/18 1100   Gross per 24 hour   Intake          3037.64 ml   Output             2350 ml   Net           687.64 ml       Lines/Drains/Airways     Peripheral Intravenous Line                 Peripheral IV - Single Lumen 06/17/18 1322 Left Antecubital 3 days         Peripheral IV - Single Lumen 06/17/18 1323 Right Antecubital 3 days                Physical Exam    General: well developed, well nourished, appears to be in NAD  Eyes: conjunctivae/corneas clear. PERRL. EOMI  Neck: supple, symmetrical, trachea midline, no JVD  Cardiovascular: Heart: regular rate and rhythm, S1, S2 normal, no murmur, click, rub or gallop.  Lungs: + bilateral basilar crackles, normal respiratory effort  Chest  Wall: no tenderness  Abdomen/Rectal: Abdomen: Non distended. + BS. No masses. No TTP.   Extremities: no edema, no redness or tenderness in the calves or thighs. Pulses: 2+ and symmetric  Skin: Skin color, texture, turgor normal. No rashes or lesions  Musculoskeletal: full range of motion of joints  Lymph Nodes: No cervical or supraclavicular adenopathy  Psych/Behavioral: Normal. Alert and oriented, appropriate affect.    Significant Labs:   Recent Results (from the past 24 hour(s))   Basic metabolic panel    Collection Time: 06/19/18  3:16 PM   Result Value Ref Range    Sodium 137 136 - 145 mmol/L    Potassium 3.7 3.5 - 5.1 mmol/L    Chloride 104 95 - 110 mmol/L    CO2 25 23 - 29 mmol/L    Glucose 187 (H) 70 - 110 mg/dL    BUN, Bld 5 (L) 6 - 20 mg/dL    Creatinine 0.8 0.5 - 1.4 mg/dL    Calcium 9.0 8.7 - 10.5 mg/dL    Anion Gap 8 8 - 16 mmol/L    eGFR if African American >60.0 >60 mL/min/1.73 m^2    eGFR if non African American >60.0 >60 mL/min/1.73 m^2   Magnesium    Collection Time: 06/19/18  3:16 PM   Result Value Ref Range    Magnesium 1.7 1.6 - 2.6 mg/dL   POCT glucose    Collection Time: 06/19/18  5:08 PM   Result Value Ref Range    POCT Glucose 283 (H) 70 - 110 mg/dL   Troponin I    Collection Time: 06/19/18  6:00 PM   Result Value Ref Range    Troponin I 4.192 (H) 0.000 - 0.026 ng/mL   Anti-Xa Heparin Monitoring    Collection Time: 06/19/18  9:20 PM   Result Value Ref Range    Heparin Anti-Xa 0.20 (L) 0.30 - 0.70 IU/mL   POCT glucose    Collection Time: 06/19/18  9:29 PM   Result Value Ref Range    POCT Glucose 304 (H) 70 - 110 mg/dL   CBC auto differential    Collection Time: 06/20/18  4:39 AM   Result Value Ref Range    WBC 6.79 3.90 - 12.70 K/uL    RBC 3.80 (L) 4.60 - 6.20 M/uL    Hemoglobin 10.8 (L) 14.0 - 18.0 g/dL    Hematocrit 32.5 (L) 40.0 - 54.0 %    MCV 86 82 - 98 fL    MCH 28.4 27.0 - 31.0 pg    MCHC 33.2 32.0 - 36.0 g/dL    RDW 12.4 11.5 - 14.5 %    Platelets 282 150 - 350 K/uL    MPV 10.8 9.2 -  12.9 fL    Immature Granulocytes 0.4 0.0 - 0.5 %    Gran # (ANC) 3.2 1.8 - 7.7 K/uL    Immature Grans (Abs) 0.03 0.00 - 0.04 K/uL    Lymph # 2.7 1.0 - 4.8 K/uL    Mono # 0.7 0.3 - 1.0 K/uL    Eos # 0.2 0.0 - 0.5 K/uL    Baso # 0.04 0.00 - 0.20 K/uL    nRBC 0 0 /100 WBC    Gran% 47.2 38.0 - 73.0 %    Lymph% 39.2 18.0 - 48.0 %    Mono% 9.9 4.0 - 15.0 %    Eosinophil% 2.7 0.0 - 8.0 %    Basophil% 0.6 0.0 - 1.9 %    Differential Method Automated    Anti-Xa Heparin Monitoring    Collection Time: 06/20/18  4:39 AM   Result Value Ref Range    Heparin Anti-Xa 0.28 (L) 0.30 - 0.70 IU/mL   Basic metabolic panel    Collection Time: 06/20/18  4:40 AM   Result Value Ref Range    Sodium 135 (L) 136 - 145 mmol/L    Potassium 3.9 3.5 - 5.1 mmol/L    Chloride 104 95 - 110 mmol/L    CO2 25 23 - 29 mmol/L    Glucose 266 (H) 70 - 110 mg/dL    BUN, Bld 5 (L) 6 - 20 mg/dL    Creatinine 0.7 0.5 - 1.4 mg/dL    Calcium 8.4 (L) 8.7 - 10.5 mg/dL    Anion Gap 6 (L) 8 - 16 mmol/L    eGFR if African American >60.0 >60 mL/min/1.73 m^2    eGFR if non African American >60.0 >60 mL/min/1.73 m^2   Magnesium    Collection Time: 06/20/18  4:40 AM   Result Value Ref Range    Magnesium 1.7 1.6 - 2.6 mg/dL   Phosphorus    Collection Time: 06/20/18  4:40 AM   Result Value Ref Range    Phosphorus 2.7 2.7 - 4.5 mg/dL   POCT glucose    Collection Time: 06/20/18  7:24 AM   Result Value Ref Range    POCT Glucose 317 (H) 70 - 110 mg/dL   Troponin I    Collection Time: 06/20/18  8:21 AM   Result Value Ref Range    Troponin I 3.915 (H) 0.000 - 0.026 ng/mL   POCT glucose    Collection Time: 06/20/18 11:09 AM   Result Value Ref Range    POCT Glucose 284 (H) 70 - 110 mg/dL

## 2018-06-20 NOTE — PROGRESS NOTES
[facility]  Cardiology  Progress Note    Patient Name: Kathy Vinson  MRN: [id number]  Admission Date: 6/17/2018  Hospital Length of Stay: 3 days  Code Status: Full Code   Attending Physician: Paul Saravia MD   Primary Care Physician: Melida Martinez MD  Expected Discharge Date:   Principal Problem:STEMI (ST elevation myocardial infarction)    Subjective:     Hospital Course:   Admitted with STEMI after presenting with chest pain. LHC done on day of admission revealed triple vessel disease. No angioplasty was done and CTS was consulted for CABG. Repeat 2D echo with EF of 28%.     Interval History:   Patient reports mild SOB denies CP. Plan for NM exam today. Continue heparin ggt. Added plavix. Diuresis lasix 40IV.     ROS     Constitutional: no fever or chills  ENT: no nasal congestion or sore throat  Respiratory: no cough or shortness of breath  Cardiovascular: no chest pain or palpitations  Gastrointestinal: no nausea or vomiting, no abdominal pain or abdominal distention   Genitourinary: no hematuria or dysuria  Integument/Breast: no rash or pruritis  Hematologic/Lymphatic: no easy bruising or lymphadenopathy  Musculoskeletal: no arthralgias or myalgias  Neurological: no seizures or tremors  Endocrine: no heat or cold intolerance    Objective:     Vital Signs (Most Recent):  Temp: 99 °F (37.2 °C) (06/20/18 0400)  Pulse: 86 (06/20/18 1316)  Resp: (!) 24 (06/20/18 1316)  BP: 111/69 (06/20/18 1104)  SpO2: 98 % (06/20/18 1316) Vital Signs (24h Range):  Temp:  [98.6 °F (37 °C)-99.8 °F (37.7 °C)] 99 °F (37.2 °C)  Pulse:  [] 86  Resp:  [19-35] 24  SpO2:  [91 %-100 %] 98 %  BP: ()/(57-77) 111/69     Weight: 89.4 kg (197 lb 1.5 oz)  Body mass index is 29.97 kg/m².     SpO2: 98 %  O2 Device (Oxygen Therapy): nasal cannula      Intake/Output Summary (Last 24 hours) at 06/20/18 1405  Last data filed at 06/20/18 1100   Gross per 24 hour   Intake          3037.64 ml   Output             2350  ml   Net           687.64 ml       Lines/Drains/Airways     Peripheral Intravenous Line                 Peripheral IV - Single Lumen 06/17/18 1322 Left Antecubital 3 days         Peripheral IV - Single Lumen 06/17/18 1323 Right Antecubital 3 days                Physical Exam    General: well developed, well nourished, appears to be in NAD  Eyes: conjunctivae/corneas clear. PERRL. EOMI  Neck: supple, symmetrical, trachea midline, no JVD  Cardiovascular: Heart: regular rate and rhythm, S1, S2 normal, no murmur, click, rub or gallop.  Lungs: + bilateral basilar crackles, normal respiratory effort  Chest Wall: no tenderness  Abdomen/Rectal: Abdomen: Non distended. + BS. No masses. No TTP.   Extremities: no edema, no redness or tenderness in the calves or thighs. Pulses: 2+ and symmetric  Skin: Skin color, texture, turgor normal. No rashes or lesions  Musculoskeletal: full range of motion of joints  Lymph Nodes: No cervical or supraclavicular adenopathy  Psych/Behavioral: Normal. Alert and oriented, appropriate affect.    Significant Labs:   Recent Results (from the past 24 hour(s))   Basic metabolic panel    Collection Time: 06/19/18  3:16 PM   Result Value Ref Range    Sodium 137 136 - 145 mmol/L    Potassium 3.7 3.5 - 5.1 mmol/L    Chloride 104 95 - 110 mmol/L    CO2 25 23 - 29 mmol/L    Glucose 187 (H) 70 - 110 mg/dL    BUN, Bld 5 (L) 6 - 20 mg/dL    Creatinine 0.8 0.5 - 1.4 mg/dL    Calcium 9.0 8.7 - 10.5 mg/dL    Anion Gap 8 8 - 16 mmol/L    eGFR if African American >60.0 >60 mL/min/1.73 m^2    eGFR if non African American >60.0 >60 mL/min/1.73 m^2   Magnesium    Collection Time: 06/19/18  3:16 PM   Result Value Ref Range    Magnesium 1.7 1.6 - 2.6 mg/dL   POCT glucose    Collection Time: 06/19/18  5:08 PM   Result Value Ref Range    POCT Glucose 283 (H) 70 - 110 mg/dL   Troponin I    Collection Time: 06/19/18  6:00 PM   Result Value Ref Range    Troponin I 4.192 (H) 0.000 - 0.026 ng/mL   Anti-Xa Heparin Monitoring     Collection Time: 06/19/18  9:20 PM   Result Value Ref Range    Heparin Anti-Xa 0.20 (L) 0.30 - 0.70 IU/mL   POCT glucose    Collection Time: 06/19/18  9:29 PM   Result Value Ref Range    POCT Glucose 304 (H) 70 - 110 mg/dL   CBC auto differential    Collection Time: 06/20/18  4:39 AM   Result Value Ref Range    WBC 6.79 3.90 - 12.70 K/uL    RBC 3.80 (L) 4.60 - 6.20 M/uL    Hemoglobin 10.8 (L) 14.0 - 18.0 g/dL    Hematocrit 32.5 (L) 40.0 - 54.0 %    MCV 86 82 - 98 fL    MCH 28.4 27.0 - 31.0 pg    MCHC 33.2 32.0 - 36.0 g/dL    RDW 12.4 11.5 - 14.5 %    Platelets 282 150 - 350 K/uL    MPV 10.8 9.2 - 12.9 fL    Immature Granulocytes 0.4 0.0 - 0.5 %    Gran # (ANC) 3.2 1.8 - 7.7 K/uL    Immature Grans (Abs) 0.03 0.00 - 0.04 K/uL    Lymph # 2.7 1.0 - 4.8 K/uL    Mono # 0.7 0.3 - 1.0 K/uL    Eos # 0.2 0.0 - 0.5 K/uL    Baso # 0.04 0.00 - 0.20 K/uL    nRBC 0 0 /100 WBC    Gran% 47.2 38.0 - 73.0 %    Lymph% 39.2 18.0 - 48.0 %    Mono% 9.9 4.0 - 15.0 %    Eosinophil% 2.7 0.0 - 8.0 %    Basophil% 0.6 0.0 - 1.9 %    Differential Method Automated    Anti-Xa Heparin Monitoring    Collection Time: 06/20/18  4:39 AM   Result Value Ref Range    Heparin Anti-Xa 0.28 (L) 0.30 - 0.70 IU/mL   Basic metabolic panel    Collection Time: 06/20/18  4:40 AM   Result Value Ref Range    Sodium 135 (L) 136 - 145 mmol/L    Potassium 3.9 3.5 - 5.1 mmol/L    Chloride 104 95 - 110 mmol/L    CO2 25 23 - 29 mmol/L    Glucose 266 (H) 70 - 110 mg/dL    BUN, Bld 5 (L) 6 - 20 mg/dL    Creatinine 0.7 0.5 - 1.4 mg/dL    Calcium 8.4 (L) 8.7 - 10.5 mg/dL    Anion Gap 6 (L) 8 - 16 mmol/L    eGFR if African American >60.0 >60 mL/min/1.73 m^2    eGFR if non African American >60.0 >60 mL/min/1.73 m^2   Magnesium    Collection Time: 06/20/18  4:40 AM   Result Value Ref Range    Magnesium 1.7 1.6 - 2.6 mg/dL   Phosphorus    Collection Time: 06/20/18  4:40 AM   Result Value Ref Range    Phosphorus 2.7 2.7 - 4.5 mg/dL   POCT glucose    Collection Time: 06/20/18   7:24 AM   Result Value Ref Range    POCT Glucose 317 (H) 70 - 110 mg/dL   Troponin I    Collection Time: 06/20/18  8:21 AM   Result Value Ref Range    Troponin I 3.915 (H) 0.000 - 0.026 ng/mL   POCT glucose    Collection Time: 06/20/18 11:09 AM   Result Value Ref Range    POCT Glucose 284 (H) 70 - 110 mg/dL             Assessment and Plan:     * STEMI (ST elevation myocardial infarction)    43 y/o M with STEMI, triple vessel disease on cardiac cath including proximal LAD. On medical management, heparin, statin, ASA. Wall motion abnormalities noted on Echo, as well as poor R-wave progression on ECG.     -ASA 81mg after loading with ASA on admission.   -High dose lipitor   - added plavix loaded; will continue christos   -continue heparin gtt   -Lopressor 12.5mg Q12h  - increase Lisinopril 5mg daily   -2D echo with color flow revealed EF 28%  - lasix IV 40mg x 1 dose  - PO lasix 20mg start christos   - NM scan today       Plan:  1. Cardiovascular: as above  2. Respiratory: no issues   3. Renal: trend creatinine   4. Neurology: no issues   5. Gi/Nutrition: cardiac, diabetic and low sodium diet   6. Hematology: no issues   7. Infectious disease: no issues   8. Muscleskeletal: no issues   9. Endocrine: POCT glucose Q6h and moderate dose sliding scale   10. Prophylaxis: On systemic AC with heparin gtt          Diabetes    A1c 12.8%    - increase Longacting insulin 13U (previoulsy 9U)  - consult endocrinology             VTE Risk Mitigation         Ordered     heparin 25,000 units in dextrose 5% 250 mL (100 units/mL) bolus from bag; ADDITIONAL PRN BOLUS  As needed (PRN)      06/17/18 2225     heparin 25,000 units in dextrose 5% 250 mL (100 units/mL) bolus from bag; ADDITIONAL PRN BOLUS  As needed (PRN)      06/17/18 2205     IP VTE LOW RISK PATIENT  Once      06/17/18 2202     heparin 25,000 units in dextrose 5% 250 mL (100 units/mL) infusion (heparin infusion)  Continuous      06/17/18 1924          Umair Beltran  MD  Cardiology  [facility]

## 2018-06-20 NOTE — HPI
Reason for Consult: Management of type 2 DM, Hyperglycemia     Surgical Procedure and Date: n/a    Diabetes diagnosis year: 2012    Home Diabetes Medications:  Toujeo 30-36 units daily; metformin 1000 mg BID  Lab Results   Component Value Date    HGBA1C 12.8 (H) 06/18/2018       How often checking glucose at home? Once; would occasionally not check bg if not feeling well   BG readings on regimen: 140-200s   Hypoglycemia on the regimen?  previously but rare  Missed doses on regimen?  Yes, about 2 times a week     Diabetes Complications include:     Hyperglycemia, Diabetic retinopathy  and Diabetic peripheral neuropathy     Complicating diabetes co morbidities:   History of MI      HPI:   Patient is a 42 y.o. male with a diagnosis of type 2 DM managed with metformin and basal insulin. Also with HTN. Patient presents with mild to moderate midsternal chest discomfort that radiates to the left arm.  It occurs intermittently ×1 week, usually after meals lasting 1-2 hours.  He initially noted it when he was eating.  Also with chest discomfort at rest while sleeping and continuing into today morning. Patient presented to ED with persistent chest pain. Endocrinology consulted for BG and DM management.

## 2018-06-20 NOTE — SUBJECTIVE & OBJECTIVE
Interval HPI:   Overnight events:  BG globally elevated since admit with basal and correction scale coverage. Nuclear medicine scan today per cardiology note.   Eatin%; has been NPO intermittently for tests; will be NPO again after midnight.   Nausea: No  Hypoglycemia and intervention: No  Fever: No  TPN and/or TF: No    PMH, PSH, FH, SH  reviewed     Review of Systems   Constitutional: Negative for weight changes.  Eyes: + for visual disturbance.  Respiratory: Negative for cough.   Cardiovascular: Negative for chest pain.  Gastrointestinal: Negative for nausea.  Endocrine: Negative for polyuria, polydipsia.  Musculoskeletal: Negative for back pain.  Skin: Negative for rash.  Neurological: Negative for syncope.  Psychiatric/Behavioral: Negative for depression.      Hyperglycemia/Diabetes Medications:   Antihyperglycemics     Start     Stop Route Frequency Ordered    18 0900  insulin detemir U-100 pen 13 Units      -- SubQ Daily 18 1002    18 1009  insulin aspart U-100 pen 0-5 Units      -- SubQ Before meals & nightly PRN 18 0910             PHYSICAL EXAMINATION:  Vitals:    18 1500   BP: 109/62   Pulse: 86   Resp: (!) 36   Temp:      Body mass index is 29.97 kg/m².    Physical Exam   Constitutional:  Well developed, well nourished, NAD.  ENT: External ears no masses with nose patent; normal hearing.   Neck:  Supple; trachea midline.   Cardiovascular: Normal heart sounds, no LE edema.     Lungs:  Normal effort; lungs anterior bilaterally clear to auscultation.  Abdomen:  Soft, no masses,  no hernias.  MS: No clubbing or cyanosis of nails noted;unable to assess gait.  Skin: No rashes, lesions, or ulcers; no nodules.  Psychiatric: Good judgement and insight; normal mood and affect.  Neurological: Cranial nerves are grossly intact.

## 2018-06-21 LAB
ANION GAP SERPL CALC-SCNC: 9 MMOL/L
BASOPHILS # BLD AUTO: 0.06 K/UL
BASOPHILS NFR BLD: 0.9 %
BUN SERPL-MCNC: 7 MG/DL
CALCIUM SERPL-MCNC: 9.5 MG/DL
CHLORIDE SERPL-SCNC: 101 MMOL/L
CO2 SERPL-SCNC: 25 MMOL/L
CREAT SERPL-MCNC: 0.8 MG/DL
DIFFERENTIAL METHOD: ABNORMAL
EOSINOPHIL # BLD AUTO: 0.2 K/UL
EOSINOPHIL NFR BLD: 3.6 %
ERYTHROCYTE [DISTWIDTH] IN BLOOD BY AUTOMATED COUNT: 12.4 %
EST. GFR  (AFRICAN AMERICAN): >60 ML/MIN/1.73 M^2
EST. GFR  (NON AFRICAN AMERICAN): >60 ML/MIN/1.73 M^2
FACT X PPP CHRO-ACNC: 0.47 IU/ML
GLUCOSE SERPL-MCNC: 214 MG/DL
HCT VFR BLD AUTO: 33.3 %
HGB BLD-MCNC: 11 G/DL
IMM GRANULOCYTES # BLD AUTO: 0.02 K/UL
IMM GRANULOCYTES NFR BLD AUTO: 0.3 %
LYMPHOCYTES # BLD AUTO: 2.7 K/UL
LYMPHOCYTES NFR BLD: 40.5 %
MAGNESIUM SERPL-MCNC: 1.4 MG/DL
MCH RBC QN AUTO: 28.1 PG
MCHC RBC AUTO-ENTMCNC: 33 G/DL
MCV RBC AUTO: 85 FL
MONOCYTES # BLD AUTO: 0.7 K/UL
MONOCYTES NFR BLD: 11.2 %
NEUTROPHILS # BLD AUTO: 2.9 K/UL
NEUTROPHILS NFR BLD: 43.5 %
NRBC BLD-RTO: 0 /100 WBC
PLATELET # BLD AUTO: 302 K/UL
PMV BLD AUTO: 10.6 FL
POCT GLUCOSE: 146 MG/DL (ref 70–110)
POCT GLUCOSE: 191 MG/DL (ref 70–110)
POCT GLUCOSE: 232 MG/DL (ref 70–110)
POCT GLUCOSE: 304 MG/DL (ref 70–110)
POTASSIUM SERPL-SCNC: 3.9 MMOL/L
RBC # BLD AUTO: 3.91 M/UL
SODIUM SERPL-SCNC: 135 MMOL/L
WBC # BLD AUTO: 6.6 K/UL

## 2018-06-21 PROCEDURE — 25000003 PHARM REV CODE 250: Performed by: INTERNAL MEDICINE

## 2018-06-21 PROCEDURE — 99232 SBSQ HOSP IP/OBS MODERATE 35: CPT | Mod: ,,, | Performed by: NURSE PRACTITIONER

## 2018-06-21 PROCEDURE — 85520 HEPARIN ASSAY: CPT

## 2018-06-21 PROCEDURE — 80048 BASIC METABOLIC PNL TOTAL CA: CPT

## 2018-06-21 PROCEDURE — 85025 COMPLETE CBC W/AUTO DIFF WBC: CPT

## 2018-06-21 PROCEDURE — 25000003 PHARM REV CODE 250: Performed by: STUDENT IN AN ORGANIZED HEALTH CARE EDUCATION/TRAINING PROGRAM

## 2018-06-21 PROCEDURE — 63600175 PHARM REV CODE 636 W HCPCS: Performed by: NURSE PRACTITIONER

## 2018-06-21 PROCEDURE — 83735 ASSAY OF MAGNESIUM: CPT

## 2018-06-21 PROCEDURE — 63600175 PHARM REV CODE 636 W HCPCS: Performed by: STUDENT IN AN ORGANIZED HEALTH CARE EDUCATION/TRAINING PROGRAM

## 2018-06-21 PROCEDURE — 99231 SBSQ HOSP IP/OBS SF/LOW 25: CPT | Mod: ,,, | Performed by: INTERNAL MEDICINE

## 2018-06-21 PROCEDURE — 20000000 HC ICU ROOM

## 2018-06-21 RX ORDER — INSULIN ASPART 100 [IU]/ML
6 INJECTION, SOLUTION INTRAVENOUS; SUBCUTANEOUS
Status: DISCONTINUED | OUTPATIENT
Start: 2018-06-21 | End: 2018-06-22

## 2018-06-21 RX ORDER — LISINOPRIL 10 MG/1
10 TABLET ORAL DAILY
Status: DISCONTINUED | OUTPATIENT
Start: 2018-06-22 | End: 2018-06-23 | Stop reason: HOSPADM

## 2018-06-21 RX ORDER — SPIRONOLACTONE 25 MG/1
25 TABLET ORAL DAILY
Status: DISCONTINUED | OUTPATIENT
Start: 2018-06-22 | End: 2018-06-21

## 2018-06-21 RX ORDER — MAGNESIUM SULFATE HEPTAHYDRATE 40 MG/ML
2 INJECTION, SOLUTION INTRAVENOUS
Status: COMPLETED | OUTPATIENT
Start: 2018-06-21 | End: 2018-06-21

## 2018-06-21 RX ORDER — INSULIN ASPART 100 [IU]/ML
6 INJECTION, SOLUTION INTRAVENOUS; SUBCUTANEOUS
Status: DISCONTINUED | OUTPATIENT
Start: 2018-06-21 | End: 2018-06-21

## 2018-06-21 RX ADMIN — INSULIN ASPART 2 UNITS: 100 INJECTION, SOLUTION INTRAVENOUS; SUBCUTANEOUS at 12:06

## 2018-06-21 RX ADMIN — CLOPIDOGREL 75 MG: 75 TABLET, FILM COATED ORAL at 08:06

## 2018-06-21 RX ADMIN — MAGNESIUM SULFATE IN WATER 2 G: 40 INJECTION, SOLUTION INTRAVENOUS at 08:06

## 2018-06-21 RX ADMIN — ATORVASTATIN CALCIUM 80 MG: 20 TABLET, FILM COATED ORAL at 08:06

## 2018-06-21 RX ADMIN — METOPROLOL TARTRATE 50 MG: 50 TABLET, FILM COATED ORAL at 08:06

## 2018-06-21 RX ADMIN — INSULIN ASPART 4 UNITS: 100 INJECTION, SOLUTION INTRAVENOUS; SUBCUTANEOUS at 11:06

## 2018-06-21 RX ADMIN — FUROSEMIDE 20 MG: 20 TABLET ORAL at 08:06

## 2018-06-21 RX ADMIN — INSULIN ASPART 4 UNITS: 100 INJECTION, SOLUTION INTRAVENOUS; SUBCUTANEOUS at 05:06

## 2018-06-21 RX ADMIN — INSULIN ASPART 6 UNITS: 100 INJECTION, SOLUTION INTRAVENOUS; SUBCUTANEOUS at 04:06

## 2018-06-21 RX ADMIN — MAGNESIUM SULFATE IN WATER 2 G: 40 INJECTION, SOLUTION INTRAVENOUS at 11:06

## 2018-06-21 RX ADMIN — LISINOPRIL 5 MG: 5 TABLET ORAL at 08:06

## 2018-06-21 RX ADMIN — HEPARIN SODIUM AND DEXTROSE 23 UNITS/KG/HR: 10000; 5 INJECTION INTRAVENOUS at 06:06

## 2018-06-21 RX ADMIN — SPIRONOLACTONE 12.5 MG: 25 TABLET, FILM COATED ORAL at 08:06

## 2018-06-21 RX ADMIN — HEPARIN SODIUM AND DEXTROSE 23 UNITS/KG/HR: 10000; 5 INJECTION INTRAVENOUS at 05:06

## 2018-06-21 RX ADMIN — ASPIRIN 81 MG CHEWABLE TABLET 81 MG: 81 TABLET CHEWABLE at 08:06

## 2018-06-21 NOTE — PLAN OF CARE
No acute events  Vitals stable  Taken to nuclear medicine for scan, tolerated well  Heparin gtt continued  Patient placed back on diet, plan for NPO at midnight  Plan for heart cath and stent placement x2 tomorrow  Patient and Wife updated on plan of care  Will continue to monitor

## 2018-06-21 NOTE — ASSESSMENT & PLAN NOTE
Bg goal 140-180    Increase Levemir to 24 units nightly.  Increase Novolog 6 units with meals if eating >25%.   Change bg monitoring to ac/hs and moderate dose correction scale.     Discharge planning- TBD; will diabetes education - not consistently taking insulin. Would prefer to see provider on [facility] if possible.

## 2018-06-21 NOTE — PROGRESS NOTES
M.4 from this am. Pt gets 20 mg Po lasix daily. Dr. Odonnell (Cardiology) notified. Received an order for 4g IV Mg once. Will follow the order and continue to monitor the patient.

## 2018-06-21 NOTE — ASSESSMENT & PLAN NOTE
43 y/o M with STEMI, triple vessel disease on cardiac cath including proximal LAD. On medical management, heparin, statin, ASA. Wall motion abnormalities noted on Echo, as well as poor R-wave progression on ECG.     -ASA 81mg after loading with ASA on admission.   -High dose lipitor   - continue plavix loaded; will continue christos   -continue heparin gtt   -continue Lopressor 12.5mg Q12h  - continue Lisinopril 5mg daily    -2D echo with color flow revealed EF 28%  - PO lasix 20mg start christos   - NM scan today       Plan:  1. Cardiovascular: as above  2. Respiratory: no issues   3. Renal: trend creatinine   4. Neurology: no issues   5. Gi/Nutrition: cardiac, diabetic and low sodium diet   6. Hematology: no issues   7. Infectious disease: no issues   8. Muscleskeletal: no issues   9. Endocrine: POCT glucose Q6h and moderate dose sliding scale   10. Prophylaxis: On systemic AC with heparin gtt

## 2018-06-21 NOTE — SUBJECTIVE & OBJECTIVE
Interval History:   NAEON. Patient denies SOB/CP.   Plan for NM exam today.   Continue heparin ggt.   Added plavix.   Possible intervention pending NM results.      ROS     Constitutional: no fever or chills  ENT: no nasal congestion or sore throat  Respiratory: no cough or shortness of breath  Cardiovascular: no chest pain or palpitations  Gastrointestinal: no nausea or vomiting, no abdominal pain or abdominal distention   Genitourinary: no hematuria or dysuria  Integument/Breast: no rash or pruritis  Hematologic/Lymphatic: no easy bruising or lymphadenopathy  Musculoskeletal: no arthralgias or myalgias  Neurological: no seizures or tremors  Endocrine: no heat or cold intolerance    Objective:     Vital Signs (Most Recent):  Temp: 98.9 °F (37.2 °C) (06/21/18 1100)  Pulse: 80 (06/21/18 1130)  Resp: (!) 35 (06/21/18 1130)  BP: (!) 96/58 (06/21/18 1100)  SpO2: 99 % (06/21/18 1130) Vital Signs (24h Range):  Temp:  [98.8 °F (37.1 °C)-99.4 °F (37.4 °C)] 98.9 °F (37.2 °C)  Pulse:  [75-96] 80  Resp:  [18-73] 35  SpO2:  [91 %-100 %] 99 %  BP: ()/(57-72) 96/58     Weight: 89.4 kg (197 lb 1.5 oz)  Body mass index is 29.97 kg/m².     SpO2: 99 %  O2 Device (Oxygen Therapy): room air      Intake/Output Summary (Last 24 hours) at 06/21/18 1300  Last data filed at 06/21/18 0523   Gross per 24 hour   Intake          2457.13 ml   Output             1325 ml   Net          1132.13 ml       Lines/Drains/Airways     Peripheral Intravenous Line                 Peripheral IV - Single Lumen 06/17/18 1322 Left Antecubital 3 days         Peripheral IV - Single Lumen 06/17/18 1323 Right Antecubital 3 days                Physical Exam    General: well developed, well nourished, appears to be in NAD  Eyes: conjunctivae/corneas clear. PERRL. EOMI  Neck: supple, symmetrical, trachea midline, no JVD  Cardiovascular: Heart: regular rate and rhythm, S1, S2 normal, no murmur, click, rub or gallop.  Lungs: + bilateral basilar crackles, normal  respiratory effort  Chest Wall: no tenderness  Abdomen/Rectal: Abdomen: Non distended. + BS. No masses. No TTP.   Extremities: no edema, no redness or tenderness in the calves or thighs. Pulses: 2+ and symmetric  Skin: Skin color, texture, turgor normal. No rashes or lesions  Musculoskeletal: full range of motion of joints  Lymph Nodes: No cervical or supraclavicular adenopathy  Psych/Behavioral: Normal. Alert and oriented, appropriate affect.    Significant Labs:   Recent Results (from the past 24 hour(s))   Anti-Xa Heparin Monitoring    Collection Time: 06/20/18  1:48 PM   Result Value Ref Range    Heparin Anti-Xa 0.25 (L) 0.30 - 0.70 IU/mL   POCT glucose    Collection Time: 06/20/18  6:32 PM   Result Value Ref Range    POCT Glucose 328 (H) 70 - 110 mg/dL   Anti-Xa Heparin Monitoring    Collection Time: 06/20/18  9:37 PM   Result Value Ref Range    Heparin Anti-Xa 0.21 (L) 0.30 - 0.70 IU/mL   POCT glucose    Collection Time: 06/20/18 11:34 PM   Result Value Ref Range    POCT Glucose 222 (H) 70 - 110 mg/dL   POCT glucose    Collection Time: 06/21/18  5:24 AM   Result Value Ref Range    POCT Glucose 232 (H) 70 - 110 mg/dL   CBC auto differential    Collection Time: 06/21/18  5:32 AM   Result Value Ref Range    WBC 6.60 3.90 - 12.70 K/uL    RBC 3.91 (L) 4.60 - 6.20 M/uL    Hemoglobin 11.0 (L) 14.0 - 18.0 g/dL    Hematocrit 33.3 (L) 40.0 - 54.0 %    MCV 85 82 - 98 fL    MCH 28.1 27.0 - 31.0 pg    MCHC 33.0 32.0 - 36.0 g/dL    RDW 12.4 11.5 - 14.5 %    Platelets 302 150 - 350 K/uL    MPV 10.6 9.2 - 12.9 fL    Immature Granulocytes 0.3 0.0 - 0.5 %    Gran # (ANC) 2.9 1.8 - 7.7 K/uL    Immature Grans (Abs) 0.02 0.00 - 0.04 K/uL    Lymph # 2.7 1.0 - 4.8 K/uL    Mono # 0.7 0.3 - 1.0 K/uL    Eos # 0.2 0.0 - 0.5 K/uL    Baso # 0.06 0.00 - 0.20 K/uL    nRBC 0 0 /100 WBC    Gran% 43.5 38.0 - 73.0 %    Lymph% 40.5 18.0 - 48.0 %    Mono% 11.2 4.0 - 15.0 %    Eosinophil% 3.6 0.0 - 8.0 %    Basophil% 0.9 0.0 - 1.9 %     Differential Method Automated    Basic metabolic panel    Collection Time: 06/21/18  5:32 AM   Result Value Ref Range    Sodium 135 (L) 136 - 145 mmol/L    Potassium 3.9 3.5 - 5.1 mmol/L    Chloride 101 95 - 110 mmol/L    CO2 25 23 - 29 mmol/L    Glucose 214 (H) 70 - 110 mg/dL    BUN, Bld 7 6 - 20 mg/dL    Creatinine 0.8 0.5 - 1.4 mg/dL    Calcium 9.5 8.7 - 10.5 mg/dL    Anion Gap 9 8 - 16 mmol/L    eGFR if African American >60.0 >60 mL/min/1.73 m^2    eGFR if non African American >60.0 >60 mL/min/1.73 m^2   Magnesium    Collection Time: 06/21/18  5:32 AM   Result Value Ref Range    Magnesium 1.4 (L) 1.6 - 2.6 mg/dL   Anti-Xa Heparin Monitoring    Collection Time: 06/21/18  5:32 AM   Result Value Ref Range    Heparin Anti-Xa 0.47 0.30 - 0.70 IU/mL   POCT glucose    Collection Time: 06/21/18 12:12 PM   Result Value Ref Range    POCT Glucose 191 (H) 70 - 110 mg/dL

## 2018-06-21 NOTE — ASSESSMENT & PLAN NOTE
A1c 12.8%    - increase Longacting insulin 24U, 6U TID w/ meals novolog  - Low dose sliding scale   - consult endocrinology; appreciate rec's

## 2018-06-21 NOTE — PLAN OF CARE
Problem: Patient Care Overview  Goal: Plan of Care Review  Outcome: Ongoing (interventions implemented as appropriate)  Pt denies Chest pain, SOB or nausea. VSS. Heparin infusing at 23 units/kg/hr. No falls, trauma or injury noted. NPO since MN. Acuucheck every 6hr, covered with Sliding scale. 1st dose of nuclear medicine completed yesterday, plan for second dose in am. CTS consulted. Possible stents Vs CABG. Plan of care reviewed with patient and spouse. Spouse at bedside. No further questions at this time. No significant events. Will continue to monitor.

## 2018-06-21 NOTE — SUBJECTIVE & OBJECTIVE
"Interval HPI:   Overnight events: remains in ICU. NAEON. BG remains elevated. Nuclear medicine scan today. Wife at bedside.   Eatin%; now NPO for testing   Nausea: No  Hypoglycemia and intervention: No  Fever: No  TPN and/or TF: No    /71   Pulse 76   Temp 98.9 °F (37.2 °C) (Oral)   Resp (!) 23   Ht 5' 8" (1.727 m)   Wt 89.4 kg (197 lb 1.5 oz)   SpO2 100%   BMI 29.97 kg/m²     Labs Reviewed and Include      Recent Labs  Lab 18  0532   *   CALCIUM 9.5   *   K 3.9   CO2 25      BUN 7   CREATININE 0.8     Lab Results   Component Value Date    WBC 6.60 2018    HGB 11.0 (L) 2018    HCT 33.3 (L) 2018    MCV 85 2018     2018     No results for input(s): TSH, FREET4 in the last 168 hours.  Lab Results   Component Value Date    HGBA1C 12.8 (H) 2018       Nutritional status:   Body mass index is 29.97 kg/m².  Lab Results   Component Value Date    ALBUMIN 2.8 (L) 2018    ALBUMIN 3.0 (L) 2018    ALBUMIN 3.4 (L) 2018     No results found for: PREALBUMIN    Estimated Creatinine Clearance: 128 mL/min (based on SCr of 0.8 mg/dL).    Accu-Checks  Recent Labs      18   2045  18   0720  18   1108  18   1708  18   2129  18   0724  18   1109  18   1832  18   2334  18   0524   POCTGLUCOSE  260*  267*  273*  283*  304*  317*  284*  328*  222*  232*       Hyperglycemia/Diabetes Medications: Antihyperglycemics     Start     Stop Route Frequency Ordered    18 0715  insulin aspart U-100 pen 4 Units      -- SubQ 3 times daily with meals 18 1653    18 2100  insulin detemir U-100 pen 20 Units      -- SubQ Nightly 18 1653    06/20/18 1746  insulin aspart U-100 pen 1-10 Units      -- SubQ Before meals & nightly PRN 18 1653        "

## 2018-06-21 NOTE — PROGRESS NOTES
"[facility]  Endocrinology  Progress Note    Admit Date: 2018     Reason for Consult: Management of type 2 DM, Hyperglycemia     Surgical Procedure and Date: n/a    Diabetes diagnosis year:     Home Diabetes Medications:  Toujeo 30-36 units daily; metformin 1000 mg BID  Lab Results   Component Value Date    HGBA1C 12.8 (H) 2018       How often checking glucose at home? Once; would occasionally not check bg if not feeling well   BG readings on regimen: 140-200s   Hypoglycemia on the regimen?  previously but rare  Missed doses on regimen?  Yes, about 2 times a week     Diabetes Complications include:     Hyperglycemia, Diabetic retinopathy  and Diabetic peripheral neuropathy     Complicating diabetes co morbidities:   History of MI      HPI:   Patient is a 42 y.o. male with a diagnosis of type 2 DM managed with metformin and basal insulin. Also with HTN. Patient presents with mild to moderate midsternal chest discomfort that radiates to the left arm.  It occurs intermittently ×1 week, usually after meals lasting 1-2 hours.  He initially noted it when he was eating.  Also with chest discomfort at rest while sleeping and continuing into today morning. Patient presented to ED with persistent chest pain. Endocrinology consulted for BG and DM management.     Interval HPI:   Overnight events: remains in ICU. NAEON. BG remains elevated. Nuclear medicine scan today. Wife at bedside.   Eatin%; now NPO for testing   Nausea: No  Hypoglycemia and intervention: No  Fever: No  TPN and/or TF: No    /71   Pulse 76   Temp 98.9 °F (37.2 °C) (Oral)   Resp (!) 23   Ht 5' 8" (1.727 m)   Wt 89.4 kg (197 lb 1.5 oz)   SpO2 100%   BMI 29.97 kg/m²       Labs Reviewed and Include      Recent Labs  Lab 18  0532   *   CALCIUM 9.5   *   K 3.9   CO2 25      BUN 7   CREATININE 0.8     Lab Results   Component Value Date    WBC 6.60 2018    HGB 11.0 (L) 2018    " HCT 33.3 (L) 06/21/2018    MCV 85 06/21/2018     06/21/2018     No results for input(s): TSH, FREET4 in the last 168 hours.  Lab Results   Component Value Date    HGBA1C 12.8 (H) 06/18/2018       Nutritional status:   Body mass index is 29.97 kg/m².  Lab Results   Component Value Date    ALBUMIN 2.8 (L) 06/18/2018    ALBUMIN 3.0 (L) 06/17/2018    ALBUMIN 3.4 (L) 06/17/2018     No results found for: PREALBUMIN    Estimated Creatinine Clearance: 128 mL/min (based on SCr of 0.8 mg/dL).    Accu-Checks  Recent Labs      06/18/18   2045  06/19/18   0720  06/19/18   1108  06/19/18   1708  06/19/18   2129  06/20/18   0724  06/20/18   1109  06/20/18   1832  06/20/18   2334  06/21/18   0524   POCTGLUCOSE  260*  267*  273*  283*  304*  317*  284*  328*  222*  232*       Hyperglycemia/Diabetes Medications: Antihyperglycemics     Start     Stop Route Frequency Ordered    06/21/18 0715  insulin aspart U-100 pen 4 Units      -- SubQ 3 times daily with meals 06/20/18 1653    06/20/18 2100  insulin detemir U-100 pen 20 Units      -- SubQ Nightly 06/20/18 1653    06/20/18 1746  insulin aspart U-100 pen 1-10 Units      -- SubQ Before meals & nightly PRN 06/20/18 1653          ASSESSMENT and PLAN    * STEMI (ST elevation myocardial infarction)    Managed per primary.   Avoid hypoglycemia.         Type 2 diabetes mellitus with hyperglycemia    Bg goal 140-180    Increase Levemir to 24 units nightly.  Increase Novolog 6 units with meals if eating >25%.   Change bg monitoring to ac/hs and moderate dose correction scale.     Discharge planning- TBD; will diabetes education - not consistently taking insulin. Would prefer to see provider on [facility] if possible.           HTN (hypertension)    Managed per primary.  If uncontrolled may increase insulin resistance.             Celeste Kerns NP  Endocrinology  [facility]

## 2018-06-21 NOTE — PROGRESS NOTES
[facility]  Cardiology  Progress Note    Patient Name: Kathy Vinson  MRN: [id number]  Admission Date: 6/17/2018  Hospital Length of Stay: 4 days  Code Status: Full Code   Attending Physician: Paul Saravia MD   Primary Care Physician: Melida Martinez MD  Expected Discharge Date:   Principal Problem:STEMI (ST elevation myocardial infarction)    Subjective:     Hospital Course:   Admitted with STEMI after presenting with chest pain. LHC done on day of admission revealed triple vessel disease. No angioplasty was done and CTS was consulted for CABG. Repeat 2D echo with EF of 28%.     Interval History:   NAEON. Patient denies SOB/CP.   Plan for NM exam today.   Continue heparin ggt.   Added plavix.   Possible intervention pending NM results.      ROS     Constitutional: no fever or chills  ENT: no nasal congestion or sore throat  Respiratory: no cough or shortness of breath  Cardiovascular: no chest pain or palpitations  Gastrointestinal: no nausea or vomiting, no abdominal pain or abdominal distention   Genitourinary: no hematuria or dysuria  Integument/Breast: no rash or pruritis  Hematologic/Lymphatic: no easy bruising or lymphadenopathy  Musculoskeletal: no arthralgias or myalgias  Neurological: no seizures or tremors  Endocrine: no heat or cold intolerance    Objective:     Vital Signs (Most Recent):  Temp: 98.9 °F (37.2 °C) (06/21/18 1100)  Pulse: 80 (06/21/18 1130)  Resp: (!) 35 (06/21/18 1130)  BP: (!) 96/58 (06/21/18 1100)  SpO2: 99 % (06/21/18 1130) Vital Signs (24h Range):  Temp:  [98.8 °F (37.1 °C)-99.4 °F (37.4 °C)] 98.9 °F (37.2 °C)  Pulse:  [75-96] 80  Resp:  [18-73] 35  SpO2:  [91 %-100 %] 99 %  BP: ()/(57-72) 96/58     Weight: 89.4 kg (197 lb 1.5 oz)  Body mass index is 29.97 kg/m².     SpO2: 99 %  O2 Device (Oxygen Therapy): room air      Intake/Output Summary (Last 24 hours) at 06/21/18 1300  Last data filed at 06/21/18 0523   Gross per 24 hour   Intake          2457.13 ml    Output             1325 ml   Net          1132.13 ml       Lines/Drains/Airways     Peripheral Intravenous Line                 Peripheral IV - Single Lumen 06/17/18 1322 Left Antecubital 3 days         Peripheral IV - Single Lumen 06/17/18 1323 Right Antecubital 3 days                Physical Exam    General: well developed, well nourished, appears to be in NAD  Eyes: conjunctivae/corneas clear. PERRL. EOMI  Neck: supple, symmetrical, trachea midline, no JVD  Cardiovascular: Heart: regular rate and rhythm, S1, S2 normal, no murmur, click, rub or gallop.  Lungs: + bilateral basilar crackles, normal respiratory effort  Chest Wall: no tenderness  Abdomen/Rectal: Abdomen: Non distended. + BS. No masses. No TTP.   Extremities: no edema, no redness or tenderness in the calves or thighs. Pulses: 2+ and symmetric  Skin: Skin color, texture, turgor normal. No rashes or lesions  Musculoskeletal: full range of motion of joints  Lymph Nodes: No cervical or supraclavicular adenopathy  Psych/Behavioral: Normal. Alert and oriented, appropriate affect.    Significant Labs:   Recent Results (from the past 24 hour(s))   Anti-Xa Heparin Monitoring    Collection Time: 06/20/18  1:48 PM   Result Value Ref Range    Heparin Anti-Xa 0.25 (L) 0.30 - 0.70 IU/mL   POCT glucose    Collection Time: 06/20/18  6:32 PM   Result Value Ref Range    POCT Glucose 328 (H) 70 - 110 mg/dL   Anti-Xa Heparin Monitoring    Collection Time: 06/20/18  9:37 PM   Result Value Ref Range    Heparin Anti-Xa 0.21 (L) 0.30 - 0.70 IU/mL   POCT glucose    Collection Time: 06/20/18 11:34 PM   Result Value Ref Range    POCT Glucose 222 (H) 70 - 110 mg/dL   POCT glucose    Collection Time: 06/21/18  5:24 AM   Result Value Ref Range    POCT Glucose 232 (H) 70 - 110 mg/dL   CBC auto differential    Collection Time: 06/21/18  5:32 AM   Result Value Ref Range    WBC 6.60 3.90 - 12.70 K/uL    RBC 3.91 (L) 4.60 - 6.20 M/uL    Hemoglobin 11.0 (L) 14.0 - 18.0 g/dL     Hematocrit 33.3 (L) 40.0 - 54.0 %    MCV 85 82 - 98 fL    MCH 28.1 27.0 - 31.0 pg    MCHC 33.0 32.0 - 36.0 g/dL    RDW 12.4 11.5 - 14.5 %    Platelets 302 150 - 350 K/uL    MPV 10.6 9.2 - 12.9 fL    Immature Granulocytes 0.3 0.0 - 0.5 %    Gran # (ANC) 2.9 1.8 - 7.7 K/uL    Immature Grans (Abs) 0.02 0.00 - 0.04 K/uL    Lymph # 2.7 1.0 - 4.8 K/uL    Mono # 0.7 0.3 - 1.0 K/uL    Eos # 0.2 0.0 - 0.5 K/uL    Baso # 0.06 0.00 - 0.20 K/uL    nRBC 0 0 /100 WBC    Gran% 43.5 38.0 - 73.0 %    Lymph% 40.5 18.0 - 48.0 %    Mono% 11.2 4.0 - 15.0 %    Eosinophil% 3.6 0.0 - 8.0 %    Basophil% 0.9 0.0 - 1.9 %    Differential Method Automated    Basic metabolic panel    Collection Time: 06/21/18  5:32 AM   Result Value Ref Range    Sodium 135 (L) 136 - 145 mmol/L    Potassium 3.9 3.5 - 5.1 mmol/L    Chloride 101 95 - 110 mmol/L    CO2 25 23 - 29 mmol/L    Glucose 214 (H) 70 - 110 mg/dL    BUN, Bld 7 6 - 20 mg/dL    Creatinine 0.8 0.5 - 1.4 mg/dL    Calcium 9.5 8.7 - 10.5 mg/dL    Anion Gap 9 8 - 16 mmol/L    eGFR if African American >60.0 >60 mL/min/1.73 m^2    eGFR if non African American >60.0 >60 mL/min/1.73 m^2   Magnesium    Collection Time: 06/21/18  5:32 AM   Result Value Ref Range    Magnesium 1.4 (L) 1.6 - 2.6 mg/dL   Anti-Xa Heparin Monitoring    Collection Time: 06/21/18  5:32 AM   Result Value Ref Range    Heparin Anti-Xa 0.47 0.30 - 0.70 IU/mL   POCT glucose    Collection Time: 06/21/18 12:12 PM   Result Value Ref Range    POCT Glucose 191 (H) 70 - 110 mg/dL             Assessment and Plan:     * STEMI (ST elevation myocardial infarction)    43 y/o M with STEMI, triple vessel disease on cardiac cath including proximal LAD. On medical management, heparin, statin, ASA. Wall motion abnormalities noted on Echo, as well as poor R-wave progression on ECG.     -ASA 81mg after loading with ASA on admission.   -High dose lipitor   - continue plavix loaded; will continue christos   -continue heparin gtt   -continue Lopressor 12.5mg  Q12h  - continue Lisinopril 5mg daily    -2D echo with color flow revealed EF 28%  - PO lasix 20mg start christos   - NM scan today       Plan:  1. Cardiovascular: as above  2. Respiratory: no issues   3. Renal: trend creatinine   4. Neurology: no issues   5. Gi/Nutrition: cardiac, diabetic and low sodium diet   6. Hematology: no issues   7. Infectious disease: no issues   8. Muscleskeletal: no issues   9. Endocrine: POCT glucose Q6h and moderate dose sliding scale   10. Prophylaxis: On systemic AC with heparin gtt          Type 2 diabetes mellitus with hyperglycemia    A1c 12.8%    - increase Longacting insulin 24U, 6U TID w/ meals novolog  - Low dose sliding scale   - consult endocrinology; appreciate rec's              VTE Risk Mitigation         Ordered     heparin 25,000 units in dextrose 5% 250 mL (100 units/mL) bolus from bag; ADDITIONAL PRN BOLUS  As needed (PRN)      06/17/18 2225     heparin 25,000 units in dextrose 5% 250 mL (100 units/mL) bolus from bag; ADDITIONAL PRN BOLUS  As needed (PRN)      06/17/18 2205     IP VTE LOW RISK PATIENT  Once      06/17/18 2202     heparin 25,000 units in dextrose 5% 250 mL (100 units/mL) infusion (heparin infusion)  Continuous      06/17/18 1924          Umair Beltran MD  Cardiology  [facility]

## 2018-06-22 DIAGNOSIS — I21.3 ST ELEVATION MYOCARDIAL INFARCTION (STEMI), UNSPECIFIED ARTERY: Primary | ICD-10-CM

## 2018-06-22 PROBLEM — I74.9: Status: ACTIVE | Noted: 2018-06-22

## 2018-06-22 LAB
ANION GAP SERPL CALC-SCNC: 11 MMOL/L
BASOPHILS # BLD AUTO: 0.05 K/UL
BASOPHILS NFR BLD: 0.8 %
BUN SERPL-MCNC: 5 MG/DL
CALCIUM SERPL-MCNC: 9.4 MG/DL
CHLORIDE SERPL-SCNC: 103 MMOL/L
CO2 SERPL-SCNC: 20 MMOL/L
CORONARY STENOSIS: ABNORMAL
CORONARY STENT: YES
CREAT SERPL-MCNC: 0.8 MG/DL
DIFFERENTIAL METHOD: ABNORMAL
EOSINOPHIL # BLD AUTO: 0.2 K/UL
EOSINOPHIL NFR BLD: 3.9 %
ERYTHROCYTE [DISTWIDTH] IN BLOOD BY AUTOMATED COUNT: 14 %
EST. GFR  (AFRICAN AMERICAN): >60 ML/MIN/1.73 M^2
EST. GFR  (NON AFRICAN AMERICAN): >60 ML/MIN/1.73 M^2
FACT X PPP CHRO-ACNC: 0.26 IU/ML
GLUCOSE SERPL-MCNC: 206 MG/DL
HCT VFR BLD AUTO: 31.9 %
HGB BLD-MCNC: 10.3 G/DL
IMM GRANULOCYTES # BLD AUTO: 0.02 K/UL
IMM GRANULOCYTES NFR BLD AUTO: 0.3 %
LYMPHOCYTES # BLD AUTO: 2.5 K/UL
LYMPHOCYTES NFR BLD: 41.4 %
MAGNESIUM SERPL-MCNC: 1.6 MG/DL
MAGNESIUM SERPL-MCNC: 2 MG/DL
MCH RBC QN AUTO: 28.3 PG
MCHC RBC AUTO-ENTMCNC: 32.3 G/DL
MCV RBC AUTO: 88 FL
MONOCYTES # BLD AUTO: 0.5 K/UL
MONOCYTES NFR BLD: 8.2 %
NEUTROPHILS # BLD AUTO: 2.7 K/UL
NEUTROPHILS NFR BLD: 45.4 %
NRBC BLD-RTO: 0 /100 WBC
PHOSPHATE SERPL-MCNC: 3.8 MG/DL
PLATELET # BLD AUTO: 374 K/UL
PMV BLD AUTO: 11 FL
POCT GLUCOSE: 153 MG/DL (ref 70–110)
POCT GLUCOSE: 200 MG/DL (ref 70–110)
POCT GLUCOSE: 259 MG/DL (ref 70–110)
POTASSIUM SERPL-SCNC: 4.2 MMOL/L
RBC # BLD AUTO: 3.64 M/UL
SODIUM SERPL-SCNC: 134 MMOL/L
WBC # BLD AUTO: 5.94 K/UL

## 2018-06-22 PROCEDURE — 63600175 PHARM REV CODE 636 W HCPCS

## 2018-06-22 PROCEDURE — B2111ZZ FLUOROSCOPY OF MULTIPLE CORONARY ARTERIES USING LOW OSMOLAR CONTRAST: ICD-10-PCS | Performed by: INTERNAL MEDICINE

## 2018-06-22 PROCEDURE — 99232 SBSQ HOSP IP/OBS MODERATE 35: CPT | Mod: ,,, | Performed by: INTERNAL MEDICINE

## 2018-06-22 PROCEDURE — C1769 GUIDE WIRE: HCPCS

## 2018-06-22 PROCEDURE — 027136Z DILATION OF CORONARY ARTERY, TWO ARTERIES WITH THREE DRUG-ELUTING INTRALUMINAL DEVICES, PERCUTANEOUS APPROACH: ICD-10-PCS | Performed by: INTERNAL MEDICINE

## 2018-06-22 PROCEDURE — 83735 ASSAY OF MAGNESIUM: CPT | Mod: 91

## 2018-06-22 PROCEDURE — 85025 COMPLETE CBC W/AUTO DIFF WBC: CPT

## 2018-06-22 PROCEDURE — 83735 ASSAY OF MAGNESIUM: CPT

## 2018-06-22 PROCEDURE — 92928 PRQ TCAT PLMT NTRAC ST 1 LES: CPT | Mod: RC,59,, | Performed by: INTERNAL MEDICINE

## 2018-06-22 PROCEDURE — 63600175 PHARM REV CODE 636 W HCPCS: Performed by: INTERNAL MEDICINE

## 2018-06-22 PROCEDURE — 25000003 PHARM REV CODE 250: Performed by: INTERNAL MEDICINE

## 2018-06-22 PROCEDURE — 80048 BASIC METABOLIC PNL TOTAL CA: CPT

## 2018-06-22 PROCEDURE — 84100 ASSAY OF PHOSPHORUS: CPT

## 2018-06-22 PROCEDURE — 99152 MOD SED SAME PHYS/QHP 5/>YRS: CPT | Mod: ,,, | Performed by: INTERNAL MEDICINE

## 2018-06-22 PROCEDURE — 20000000 HC ICU ROOM

## 2018-06-22 PROCEDURE — 99232 SBSQ HOSP IP/OBS MODERATE 35: CPT | Mod: ,,, | Performed by: NURSE PRACTITIONER

## 2018-06-22 PROCEDURE — 92943 PRQ TRLUML REVSC CH OCC ANT: CPT | Mod: LC,52,, | Performed by: INTERNAL MEDICINE

## 2018-06-22 PROCEDURE — 85520 HEPARIN ASSAY: CPT

## 2018-06-22 PROCEDURE — 25000003 PHARM REV CODE 250: Performed by: STUDENT IN AN ORGANIZED HEALTH CARE EDUCATION/TRAINING PROGRAM

## 2018-06-22 PROCEDURE — G0269 OCCLUSIVE DEVICE IN VEIN ART: HCPCS

## 2018-06-22 PROCEDURE — 63600175 PHARM REV CODE 636 W HCPCS: Performed by: NURSE PRACTITIONER

## 2018-06-22 PROCEDURE — 94761 N-INVAS EAR/PLS OXIMETRY MLT: CPT

## 2018-06-22 PROCEDURE — 25000003 PHARM REV CODE 250

## 2018-06-22 RX ORDER — IBUPROFEN 200 MG
24 TABLET ORAL
Status: DISCONTINUED | OUTPATIENT
Start: 2018-06-22 | End: 2018-06-23 | Stop reason: HOSPADM

## 2018-06-22 RX ORDER — DIPHENHYDRAMINE HCL 25 MG
50 CAPSULE ORAL ONCE
Status: COMPLETED | OUTPATIENT
Start: 2018-06-22 | End: 2018-06-22

## 2018-06-22 RX ORDER — INSULIN ASPART 100 [IU]/ML
6 INJECTION, SOLUTION INTRAVENOUS; SUBCUTANEOUS
Status: DISCONTINUED | OUTPATIENT
Start: 2018-06-22 | End: 2018-06-23

## 2018-06-22 RX ORDER — IBUPROFEN 200 MG
16 TABLET ORAL
Status: DISCONTINUED | OUTPATIENT
Start: 2018-06-22 | End: 2018-06-23 | Stop reason: HOSPADM

## 2018-06-22 RX ORDER — GLUCAGON 1 MG
1 KIT INJECTION
Status: DISCONTINUED | OUTPATIENT
Start: 2018-06-22 | End: 2018-06-23 | Stop reason: HOSPADM

## 2018-06-22 RX ORDER — SODIUM CHLORIDE 9 MG/ML
3 INJECTION, SOLUTION INTRAVENOUS CONTINUOUS
Status: ACTIVE | OUTPATIENT
Start: 2018-06-22 | End: 2018-06-22

## 2018-06-22 RX ORDER — INSULIN ASPART 100 [IU]/ML
1-10 INJECTION, SOLUTION INTRAVENOUS; SUBCUTANEOUS
Status: DISCONTINUED | OUTPATIENT
Start: 2018-06-22 | End: 2018-06-23 | Stop reason: HOSPADM

## 2018-06-22 RX ORDER — MAGNESIUM SULFATE HEPTAHYDRATE 40 MG/ML
2 INJECTION, SOLUTION INTRAVENOUS ONCE
Status: COMPLETED | OUTPATIENT
Start: 2018-06-22 | End: 2018-06-22

## 2018-06-22 RX ORDER — ACETAMINOPHEN 325 MG/1
650 TABLET ORAL EVERY 6 HOURS PRN
Status: DISCONTINUED | OUTPATIENT
Start: 2018-06-22 | End: 2018-06-23 | Stop reason: HOSPADM

## 2018-06-22 RX ADMIN — APIXABAN 5 MG: 5 TABLET, FILM COATED ORAL at 05:06

## 2018-06-22 RX ADMIN — ACETAMINOPHEN 650 MG: 325 TABLET, FILM COATED ORAL at 07:06

## 2018-06-22 RX ADMIN — METOPROLOL TARTRATE 50 MG: 50 TABLET, FILM COATED ORAL at 10:06

## 2018-06-22 RX ADMIN — INSULIN ASPART 2 UNITS: 100 INJECTION, SOLUTION INTRAVENOUS; SUBCUTANEOUS at 03:06

## 2018-06-22 RX ADMIN — CLOPIDOGREL 75 MG: 75 TABLET, FILM COATED ORAL at 07:06

## 2018-06-22 RX ADMIN — ATORVASTATIN CALCIUM 80 MG: 20 TABLET, FILM COATED ORAL at 09:06

## 2018-06-22 RX ADMIN — FUROSEMIDE 20 MG: 20 TABLET ORAL at 10:06

## 2018-06-22 RX ADMIN — LISINOPRIL 10 MG: 10 TABLET ORAL at 10:06

## 2018-06-22 RX ADMIN — SPIRONOLACTONE 12.5 MG: 25 TABLET, FILM COATED ORAL at 10:06

## 2018-06-22 RX ADMIN — INSULIN DETEMIR 28 UNITS: 100 INJECTION, SOLUTION SUBCUTANEOUS at 09:06

## 2018-06-22 RX ADMIN — INSULIN ASPART 2 UNITS: 100 INJECTION, SOLUTION INTRAVENOUS; SUBCUTANEOUS at 09:06

## 2018-06-22 RX ADMIN — SODIUM CHLORIDE 1.5 ML/KG/HR: 0.9 INJECTION, SOLUTION INTRAVENOUS at 10:06

## 2018-06-22 RX ADMIN — MAGNESIUM SULFATE IN WATER 2 G: 40 INJECTION, SOLUTION INTRAVENOUS at 07:06

## 2018-06-22 RX ADMIN — METOPROLOL TARTRATE 50 MG: 50 TABLET, FILM COATED ORAL at 09:06

## 2018-06-22 RX ADMIN — ASPIRIN 81 MG CHEWABLE TABLET 81 MG: 81 TABLET CHEWABLE at 07:06

## 2018-06-22 RX ADMIN — INSULIN ASPART 6 UNITS: 100 INJECTION, SOLUTION INTRAVENOUS; SUBCUTANEOUS at 06:06

## 2018-06-22 RX ADMIN — DIPHENHYDRAMINE HYDROCHLORIDE 50 MG: 25 CAPSULE ORAL at 07:06

## 2018-06-22 RX ADMIN — INSULIN ASPART 6 UNITS: 100 INJECTION, SOLUTION INTRAVENOUS; SUBCUTANEOUS at 03:06

## 2018-06-22 NOTE — NURSING
Dr. Adorno called regarding heparin infusion prior to [facility] today. States to continue infusion until on-call for procedure from Cath Lab.

## 2018-06-22 NOTE — SUBJECTIVE & OBJECTIVE
Interval History:  Patient not examined at bedside today. At time of evaluation patient down for procedure.     ROS  Objective:     Vital Signs (Most Recent):  Temp: 98 °F (36.7 °C) (06/22/18 1015)  Pulse: 78 (06/22/18 1500)  Resp: (!) 30 (06/22/18 1500)  BP: 109/72 (06/22/18 1500)  SpO2: (!) 93 % (06/22/18 1500) Vital Signs (24h Range):  Temp:  [98 °F (36.7 °C)-98.9 °F (37.2 °C)] 98 °F (36.7 °C)  Pulse:  [] 78  Resp:  [7-37] 30  SpO2:  [90 %-100 %] 93 %  BP: ()/(58-85) 109/72     Weight: 89.4 kg (197 lb 1.5 oz)  Body mass index is 29.97 kg/m².     SpO2: (!) 93 %  O2 Device (Oxygen Therapy): room air      Intake/Output Summary (Last 24 hours) at 06/22/18 1545  Last data filed at 06/22/18 1523   Gross per 24 hour   Intake          2044.71 ml   Output             3000 ml   Net          -955.29 ml       Lines/Drains/Airways     Peripheral Intravenous Line                 Peripheral IV - Single Lumen 06/17/18 1322 Left Antecubital 5 days         Peripheral IV - Single Lumen 06/17/18 1323 Right Antecubital 5 days         Peripheral IV - Single Lumen 06/22/18 0643 Left Wrist less than 1 day                Physical Exam   Not performed as patient is down with interventional radiology for PCI/stent placement.     Significant Labs:   BMP:   Recent Labs  Lab 06/21/18  0532 06/22/18  0618   * 206*   * 134*   K 3.9 4.2    103   CO2 25 20*   BUN 7 5*   CREATININE 0.8 0.8   CALCIUM 9.5 9.4   MG 1.4* 2.0   , CMP   Recent Labs  Lab 06/21/18  0532 06/22/18  0618   * 134*   K 3.9 4.2    103   CO2 25 20*   * 206*   BUN 7 5*   CREATININE 0.8 0.8   CALCIUM 9.5 9.4   ANIONGAP 9 11   ESTGFRAFRICA >60.0 >60.0   EGFRNONAA >60.0 >60.0   , CBC   Recent Labs  Lab 06/21/18  0532 06/22/18  0453   WBC 6.60 5.94   HGB 11.0* 10.3*   HCT 33.3* 31.9*    374*    and All pertinent lab results from the last 24 hours have been reviewed.

## 2018-06-22 NOTE — NURSING
Patient returned from Cath Lab. Groin site assessment complete with RN handoff. Patient educated on bedrest x2 hours and frequent monitoring of vital signs, pulse checks, and groin site. Denies chest pain at this time. Resting quietly.

## 2018-06-22 NOTE — OP NOTE
"    Post Cath Note  Referring Physician: Senthil Devi MD  Procedure: Left heart cath (N/A)       Access: Right CFA  Thrombus present in CFA and PFA  See full report for further details    Intervention:   RYLIE PCI to RCA and LAD  Unable to cross Left Cx   Closure device: Mynx    Post Cath Exam:   /78 (BP Location: Left arm, Patient Position: Sitting)   Pulse 80   Temp 98.6 °F (37 °C) (Oral)   Resp 18   Ht 5' 8" (1.727 m)   Wt 89.4 kg (197 lb 1.5 oz)   SpO2 95%   BMI 29.97 kg/m²   No unusual pain, hematoma, thrill or bruit at vascular access site.  Distal pulse present without signs of ischemia.    Recommendations:   - Routine post-cath care  - IVF at 1.5 cc/kg/hr  for 4 hrs  - Triple therapy with DAPT + DOAC, Cardiac rehab referral, Continue medical management  -Follow up with Dr Patten in 1 month with cardiac PET  CHING CHAUHAN  PGY 4  "

## 2018-06-22 NOTE — PROGRESS NOTES
[facility]  Cardiology  Progress Note    Patient Name: Kathy Vinson  MRN: [id number]  Admission Date: 6/17/2018  Hospital Length of Stay: 5 days  Code Status: Full Code   Attending Physician: Senthil Devi MD   Primary Care Physician: Melida Martinez MD  Expected Discharge Date:   Principal Problem:STEMI (ST elevation myocardial infarction)    Subjective:     Hospital Course:   Admitted with STEMI after presenting with chest pain. LHC done on day of admission revealed triple vessel disease. No angioplasty was done and CTS was consulted for CABG. Repeat 2D echo with EF of 28%.     Interval History:  Patient not examined at bedside today. At time of evaluation patient down for procedure.     ROS  Objective:     Vital Signs (Most Recent):  Temp: 98 °F (36.7 °C) (06/22/18 1015)  Pulse: 78 (06/22/18 1500)  Resp: (!) 30 (06/22/18 1500)  BP: 109/72 (06/22/18 1500)  SpO2: (!) 93 % (06/22/18 1500) Vital Signs (24h Range):  Temp:  [98 °F (36.7 °C)-98.9 °F (37.2 °C)] 98 °F (36.7 °C)  Pulse:  [] 78  Resp:  [7-37] 30  SpO2:  [90 %-100 %] 93 %  BP: ()/(58-85) 109/72     Weight: 89.4 kg (197 lb 1.5 oz)  Body mass index is 29.97 kg/m².     SpO2: (!) 93 %  O2 Device (Oxygen Therapy): room air      Intake/Output Summary (Last 24 hours) at 06/22/18 1545  Last data filed at 06/22/18 1523   Gross per 24 hour   Intake          2044.71 ml   Output             3000 ml   Net          -955.29 ml       Lines/Drains/Airways     Peripheral Intravenous Line                 Peripheral IV - Single Lumen 06/17/18 1322 Left Antecubital 5 days         Peripheral IV - Single Lumen 06/17/18 1323 Right Antecubital 5 days         Peripheral IV - Single Lumen 06/22/18 0643 Left Wrist less than 1 day                Physical Exam   Not performed as patient is down with interventional radiology for PCI/stent placement.     Significant Labs:   BMP:   Recent Labs  Lab 06/21/18  0532 06/22/18  0618   * 206*   *  134*   K 3.9 4.2    103   CO2 25 20*   BUN 7 5*   CREATININE 0.8 0.8   CALCIUM 9.5 9.4   MG 1.4* 2.0   , CMP   Recent Labs  Lab 06/21/18  0532 06/22/18  0618   * 134*   K 3.9 4.2    103   CO2 25 20*   * 206*   BUN 7 5*   CREATININE 0.8 0.8   CALCIUM 9.5 9.4   ANIONGAP 9 11   ESTGFRAFRICA >60.0 >60.0   EGFRNONAA >60.0 >60.0   , CBC   Recent Labs  Lab 06/21/18  0532 06/22/18  0453   WBC 6.60 5.94   HGB 11.0* 10.3*   HCT 33.3* 31.9*    374*    and All pertinent lab results from the last 24 hours have been reviewed.        Assessment and Plan:       * STEMI (ST elevation myocardial infarction)    43 y/o M with STEMI, triple vessel disease on cardiac cath including proximal LAD. On medical management, heparin, statin, ASA. Wall motion abnormalities noted on Echo, as well as poor R-wave progression on ECG.     -ASA 81mg after loading with ASA on admission.   -High dose lipitor   - continue plavix loaded; will continue [facility]   -continue heparin gtt   -continue Lopressor 12.5mg Q12h  - continue Lisinopril 5mg daily    -2D echo with color flow revealed EF 28%  - PO lasix 20mg start christos   - NM scan today       Plan:  1. Cardiovascular: as above  2. Respiratory: no issues   3. Renal: trend creatinine   4. Neurology: no issues   5. Gi/Nutrition: cardiac, diabetic and low sodium diet   6. Hematology: no issues   7. Infectious disease: no issues   8. Muscleskeletal: no issues   9. Endocrine: POCT glucose Q6h and moderate dose sliding scale   10. Prophylaxis: On systemic AC with heparin gtt          Arterial occlusion due to thromboembolism    - will start on AC today ~ 4pm         Type 2 diabetes mellitus with hyperglycemia    A1c 12.8%    - increase Longacting insulin 24U, 6U TID w/ meals novolog  - Low dose sliding scale   - consult endocrinology; appreciate rec's              VTE Risk Mitigation         Ordered     apixaban tablet 5 mg  2 times daily      06/22/18 1411     heparin 25,000 units in  dextrose 5% 250 mL (100 units/mL) bolus from bag; ADDITIONAL PRN BOLUS  As needed (PRN)      06/17/18 2225     heparin 25,000 units in dextrose 5% 250 mL (100 units/mL) bolus from bag; ADDITIONAL PRN BOLUS  As needed (PRN)      06/17/18 2205     IP VTE LOW RISK PATIENT  Once      06/17/18 2202          Umair Beltran MD  Cardiology  [facility]

## 2018-06-22 NOTE — NURSING
Medication education given to patient and his wife at his bedside. Adult patient education handouts given for Aspirin, Plavix, Apixiban, Lisinopril, Metoprolol, and Atorvastatin. Most important information highlighted on handouts and explained including indications and side effects. Patient and wife both state understanding and deny questions at this time. I encouraged reading through the packets and asking any questions that may arise.

## 2018-06-22 NOTE — SUBJECTIVE & OBJECTIVE
Past Medical History:   Diagnosis Date    Diabetes mellitus     Hypertension        Past Surgical History:   Procedure Laterality Date    HERNIA REPAIR         Review of patient's allergies indicates:  No Known Allergies    PTA Medications   Medication Sig    aspirin (ECOTRIN) 81 MG EC tablet Take 81 mg by mouth once daily.    insulin aspart protamine-insulin aspart (NOVOLOG 70/30) 100 unit/mL (70-30) InPn pen Inject into the skin 2 (two) times daily before meals.    insulin glargine (LANTUS SOLOSTAR) 100 unit/mL (3 mL) InPn pen Inject into the skin.    lisinopril (PRINIVIL,ZESTRIL) 2.5 MG tablet Take 2.5 mg by mouth once daily.    metFORMIN (GLUCOPHAGE) 500 MG tablet Take 1,000 mg by mouth 2 (two) times daily with meals.      Family History     None        Social History Main Topics    Smoking status: Never Smoker    Smokeless tobacco: Current User     Types: Snuff    Alcohol use No      Comment: occasion    Drug use: No    Sexual activity: Yes     Review of Systems   All other systems reviewed and are negative.    Objective:     Vital Signs (Most Recent):  Temp: 98.8 °F (37.1 °C) (06/22/18 0315)  Pulse: 80 (06/22/18 0630)  Resp: (!) 27 (06/22/18 0630)  BP: 119/76 (06/22/18 0600)  SpO2: 96 % (06/22/18 0630) Vital Signs (24h Range):  Temp:  [98 °F (36.7 °C)-98.9 °F (37.2 °C)] 98.8 °F (37.1 °C)  Pulse:  [] 80  Resp:  [18-42] 27  SpO2:  [90 %-100 %] 96 %  BP: ()/(58-78) 119/76     Weight: 89.4 kg (197 lb 1.5 oz)  Body mass index is 29.97 kg/m².    SpO2: 96 %  O2 Device (Oxygen Therapy): room air      Intake/Output Summary (Last 24 hours) at 06/22/18 0702  Last data filed at 06/22/18 0621   Gross per 24 hour   Intake           839.71 ml   Output             2400 ml   Net         -1560.29 ml       Lines/Drains/Airways     Peripheral Intravenous Line                 Peripheral IV - Single Lumen 06/17/18 1322 Left Antecubital 4 days         Peripheral IV - Single Lumen 06/17/18 1323 Right  Antecubital 4 days         Peripheral IV - Single Lumen 06/22/18 0643 Left Wrist less than 1 day                Physical Exam  General: alert, awake and oriented x 3  Eyes:PERRL.   Neck:no JVD   Lungs:  clear to auscultation bilaterally   Cardiovascular: Heart: regular rate and rhythm, S1, S2 normal, no murmur, click, rub or gallop.   Chest Wall: no tenderness.   Pulses-2+ radial, femoral, DP, PT b/l  Extremities: no cyanosis or edema.   Abdomen/Rectal: Abdomen: soft, non-tender non-distented; bowel sounds normal  Neurologic: Normal strength and tone. No focal numbness or weakness  Significant Labs:   CMP   Recent Labs  Lab 06/21/18  0532   *   K 3.9      CO2 25   *   BUN 7   CREATININE 0.8   CALCIUM 9.5   ANIONGAP 9   ESTGFRAFRICA >60.0   EGFRNONAA >60.0    and CBC   Recent Labs  Lab 06/21/18  0532 06/22/18  0453   WBC 6.60 5.94   HGB 11.0* 10.3*   HCT 33.3* 31.9*    374*       Significant Imaging: Echocardiogram:   2D echo with color flow doppler:   Results for orders placed or performed during the hospital encounter of 06/17/18   2D echo with color flow doppler   Result Value Ref Range    EF 28 (A) 55 - 65    Mitral Valve Regurgitation MODERATE (A)

## 2018-06-22 NOTE — HPI
42-year-old gentleman with h/o HTN, DM-2 presented with mild to moderate midsternal chest discomfort that radiates to the left arm.  It occurs intermittently ×1 week, usually after meals lasting 1-2 hours.  He initially noted it when he was eating. Subsequently chest discomfort at rest while sleeping and continuing into today morning. Same day at [facility] the CP continued and concerned him enough to come into the emergency department.  Denies nausea, vomiting, shortness of breath, lightheadedness.     Cardiac cath today   Left Main Coronary Artery:             The LM has luminal irregularities. There is RAF 3 flow.     - Left Anterior Descending Artery:             The proximal LAD is occluded (100). There is RAF 0 flow. Collateral filling from right coronary artery.     - Left Circumflex Artery:             The LCX has a 90% stenosis. There is RAF 3 flow.     - Right Coronary Artery:             The RCA has a 90% stenosis. There is RAF 3 flow.     - Common Femoral Artery:             The right CFA is normal.     - Superficial Femoral Artery:             The right proximal SFA has a 70% stenosis.                     Lesion Details:   There is severe thrombus. Saddle thrombus.     - Profunda Femoral Artery:             The right proximal PFA has a 70% stenosis.                     Lesion Details:   There is severe thrombus. Saddle Thrombus     Bedside echo done  using contrast: EF 30-35%, RV normal function. Mid-anteroseptum akinetic, mid to distal inferospetum akinetic, apex akinetic and distal anterolateral wall is akinetic, LV apex appears aneurysmal.  No thrombus noted.    CTS consulted -recommned PCI of LCX and RCA and viability eval for LAD.

## 2018-06-22 NOTE — ASSESSMENT & PLAN NOTE
- Keep NPO   -RYLIE candidate  -RCA and Lcx revascularization  -Severe thrombus right SFA and PFA  -The risks, benefits and alternatives of the procedure were explained to the patient.   -The risks of coronary angiography include but are not limited to: bleeding, infection, heart rhythm abnormalities, allergic reactions, kidney injury and potential need for dialysis, stroke and death.   -The risks of moderate sedation include hypotension, respiratory depression, arrhythmias, bronchospasm, and death.   - Informed consent was obtained and the  patient is agreeable to proceed with the procedure.

## 2018-06-22 NOTE — ASSESSMENT & PLAN NOTE
Bg goal 140-180    Increase Levemir to 28 units nightly.  Continue Novolog 6 units with meals if eating >25% once diet resumed.   Change bg monitoring to ac/hs and moderate dose correction scale.     Discharge planning- TBD; will diabetes education - not consistently taking insulin. Would prefer to see provider on [facility] if possible.

## 2018-06-22 NOTE — NURSING
Patient transferred to Cath Lab for procedure. Monitoring continued. Heparin infusion discontinued. Aspirin, Plavix, Benadryl given as ordered at cath lab nurse's request. All other AM medications held. Afebrile, all vital signs stable. Wife at bedside updated. Both patient and wife deny questions at this time.

## 2018-06-22 NOTE — SUBJECTIVE & OBJECTIVE
"Interval HPI:   Overnight events: remains in ICU. LHC today; PCI to RCA and LAD. BG remains above goal.   Eating:   NPO  For LHC  Nausea: No  Hypoglycemia and intervention: No  Fever: No  TPN and/or TF: No    /78 (BP Location: Left arm, Patient Position: Sitting)   Pulse 80   Temp 98.6 °F (37 °C) (Oral)   Resp 18   Ht 5' 8" (1.727 m)   Wt 89.4 kg (197 lb 1.5 oz)   SpO2 95%   BMI 29.97 kg/m²     Labs Reviewed and Include      Recent Labs  Lab 06/22/18 0618   *   CALCIUM 9.4   *   K 4.2   CO2 20*      BUN 5*   CREATININE 0.8     Lab Results   Component Value Date    WBC 5.94 06/22/2018    HGB 10.3 (L) 06/22/2018    HCT 31.9 (L) 06/22/2018    MCV 88 06/22/2018     (H) 06/22/2018     No results for input(s): TSH, FREET4 in the last 168 hours.  Lab Results   Component Value Date    HGBA1C 12.8 (H) 06/18/2018       Nutritional status:   Body mass index is 29.97 kg/m².  Lab Results   Component Value Date    ALBUMIN 2.8 (L) 06/18/2018    ALBUMIN 3.0 (L) 06/17/2018    ALBUMIN 3.4 (L) 06/17/2018     No results found for: PREALBUMIN    Estimated Creatinine Clearance: 128 mL/min (based on SCr of 0.8 mg/dL).    Accu-Checks  Recent Labs      06/19/18   2129  06/20/18   0724  06/20/18   1109  06/20/18   1832  06/20/18   2334  06/21/18   0524  06/21/18   1212  06/21/18   1619  06/21/18   2330  06/22/18   0602   POCTGLUCOSE  304*  317*  284*  328*  222*  232*  191*  146*  304*  259*       Hyperglycemia/Diabetes Medications: Antihyperglycemics     Start     Stop Route Frequency Ordered    06/21/18 2100  insulin detemir U-100 pen 24 Units      -- SubQ Nightly 06/21/18 1108    06/20/18 1746  insulin aspart U-100 pen 1-10 Units      -- SubQ Before meals & nightly PRN 06/20/18 8183        "

## 2018-06-22 NOTE — PLAN OF CARE
Problem: Patient Care Overview  Goal: Plan of Care Review  Outcome: Ongoing (interventions implemented as appropriate)  Pt denies Chest pain, SOB or nausea. VSS. Heparin infusing at 23 units/kg/hr. No falls, trauma or injury noted. NPO since MN. Acuucheck every 6hr, covered with Sliding scale. Two doses of nuclear medicine completed yesterday, plan for stents placement today. CTS consulted.  Plan of care reviewed with patient and spouse. Spouse at bedside. No further questions at this time. No significant events. Will continue to monitor.

## 2018-06-23 ENCOUNTER — TELEPHONE (OUTPATIENT)
Dept: ENDOCRINOLOGY | Facility: HOSPITAL | Age: 43
End: 2018-06-23

## 2018-06-23 VITALS
SYSTOLIC BLOOD PRESSURE: 100 MMHG | BODY MASS INDEX: 29.86 KG/M2 | OXYGEN SATURATION: 95 % | TEMPERATURE: 99 F | WEIGHT: 197.06 LBS | HEART RATE: 77 BPM | HEIGHT: 68 IN | RESPIRATION RATE: 25 BRPM | DIASTOLIC BLOOD PRESSURE: 59 MMHG

## 2018-06-23 DIAGNOSIS — E11.8 TYPE 2 DIABETES MELLITUS WITH COMPLICATION, UNSPECIFIED WHETHER LONG TERM INSULIN USE: Primary | ICD-10-CM

## 2018-06-23 LAB
ANION GAP SERPL CALC-SCNC: 8 MMOL/L
BASOPHILS # BLD AUTO: 0.04 K/UL
BASOPHILS NFR BLD: 0.7 %
BUN SERPL-MCNC: 4 MG/DL
CALCIUM SERPL-MCNC: 9 MG/DL
CHLORIDE SERPL-SCNC: 102 MMOL/L
CO2 SERPL-SCNC: 23 MMOL/L
CREAT SERPL-MCNC: 0.8 MG/DL
DIFFERENTIAL METHOD: ABNORMAL
EOSINOPHIL # BLD AUTO: 0.2 K/UL
EOSINOPHIL NFR BLD: 3 %
ERYTHROCYTE [DISTWIDTH] IN BLOOD BY AUTOMATED COUNT: 12.6 %
EST. GFR  (AFRICAN AMERICAN): >60 ML/MIN/1.73 M^2
EST. GFR  (NON AFRICAN AMERICAN): >60 ML/MIN/1.73 M^2
GLUCOSE SERPL-MCNC: 236 MG/DL
HCT VFR BLD AUTO: 32.5 %
HGB BLD-MCNC: 11 G/DL
IMM GRANULOCYTES # BLD AUTO: 0.01 K/UL
IMM GRANULOCYTES NFR BLD AUTO: 0.2 %
LYMPHOCYTES # BLD AUTO: 1.1 K/UL
LYMPHOCYTES NFR BLD: 19.5 %
MAGNESIUM SERPL-MCNC: 2.9 MG/DL
MCH RBC QN AUTO: 28.8 PG
MCHC RBC AUTO-ENTMCNC: 33.8 G/DL
MCV RBC AUTO: 85 FL
MONOCYTES # BLD AUTO: 0.6 K/UL
MONOCYTES NFR BLD: 10.8 %
NEUTROPHILS # BLD AUTO: 3.8 K/UL
NEUTROPHILS NFR BLD: 65.8 %
NRBC BLD-RTO: 0 /100 WBC
PLATELET # BLD AUTO: 284 K/UL
PMV BLD AUTO: 11 FL
POCT GLUCOSE: 115 MG/DL (ref 70–110)
POCT GLUCOSE: 185 MG/DL (ref 70–110)
POTASSIUM SERPL-SCNC: 3.8 MMOL/L
RBC # BLD AUTO: 3.82 M/UL
SODIUM SERPL-SCNC: 133 MMOL/L
WBC # BLD AUTO: 5.73 K/UL

## 2018-06-23 PROCEDURE — 25000003 PHARM REV CODE 250: Performed by: STUDENT IN AN ORGANIZED HEALTH CARE EDUCATION/TRAINING PROGRAM

## 2018-06-23 PROCEDURE — 83735 ASSAY OF MAGNESIUM: CPT

## 2018-06-23 PROCEDURE — 80048 BASIC METABOLIC PNL TOTAL CA: CPT

## 2018-06-23 PROCEDURE — 99232 SBSQ HOSP IP/OBS MODERATE 35: CPT | Mod: ,,, | Performed by: NURSE PRACTITIONER

## 2018-06-23 PROCEDURE — 25000003 PHARM REV CODE 250: Performed by: INTERNAL MEDICINE

## 2018-06-23 PROCEDURE — 99239 HOSP IP/OBS DSCHRG MGMT >30: CPT | Mod: ,,, | Performed by: INTERNAL MEDICINE

## 2018-06-23 PROCEDURE — 85025 COMPLETE CBC W/AUTO DIFF WBC: CPT

## 2018-06-23 RX ORDER — METOPROLOL TARTRATE 50 MG/1
50 TABLET ORAL 2 TIMES DAILY
Qty: 180 TABLET | Refills: 3 | Status: SHIPPED | OUTPATIENT
Start: 2018-06-23 | End: 2022-05-13

## 2018-06-23 RX ORDER — CLOPIDOGREL BISULFATE 75 MG/1
75 TABLET ORAL DAILY
Qty: 30 TABLET | Refills: 3 | Status: SHIPPED | OUTPATIENT
Start: 2018-06-24 | End: 2022-05-13

## 2018-06-23 RX ORDER — LANCETS
EACH MISCELLANEOUS
Qty: 150 EACH | Refills: 2 | Status: SHIPPED | OUTPATIENT
Start: 2018-06-23

## 2018-06-23 RX ORDER — DEXTROSE 4 G
TABLET,CHEWABLE ORAL
Qty: 1 EACH | Refills: 0 | Status: SHIPPED | OUTPATIENT
Start: 2018-06-23

## 2018-06-23 RX ORDER — LISINOPRIL 10 MG/1
10 TABLET ORAL DAILY
Qty: 90 TABLET | Refills: 3 | Status: SHIPPED | OUTPATIENT
Start: 2018-06-24 | End: 2022-05-13

## 2018-06-23 RX ORDER — BISACODYL 5 MG
TABLET, DELAYED RELEASE (ENTERIC COATED) ORAL
Status: DISCONTINUED
Start: 2018-06-23 | End: 2018-06-23 | Stop reason: HOSPADM

## 2018-06-23 RX ORDER — NITROGLYCERIN 0.4 MG/1
0.4 TABLET SUBLINGUAL
Qty: 30 TABLET | Refills: 0 | Status: SHIPPED | OUTPATIENT
Start: 2018-06-23 | End: 2021-09-09

## 2018-06-23 RX ORDER — ATORVASTATIN CALCIUM 80 MG/1
80 TABLET, FILM COATED ORAL NIGHTLY
Qty: 90 TABLET | Refills: 3 | Status: SHIPPED | OUTPATIENT
Start: 2018-06-23 | End: 2021-09-09 | Stop reason: SDUPTHER

## 2018-06-23 RX ORDER — INSULIN ASPART 100 [IU]/ML
8 INJECTION, SOLUTION INTRAVENOUS; SUBCUTANEOUS
Qty: 21.6 ML | Refills: 3 | Status: SHIPPED | OUTPATIENT
Start: 2018-06-23 | End: 2018-07-31

## 2018-06-23 RX ORDER — INSULIN ASPART 100 [IU]/ML
8 INJECTION, SOLUTION INTRAVENOUS; SUBCUTANEOUS
Status: DISCONTINUED | OUTPATIENT
Start: 2018-06-23 | End: 2018-06-23 | Stop reason: HOSPADM

## 2018-06-23 RX ORDER — INSULIN ASPART 100 [IU]/ML
8 INJECTION, SOLUTION INTRAVENOUS; SUBCUTANEOUS
Qty: 1 BOX | Refills: 2 | Status: SHIPPED | OUTPATIENT
Start: 2018-06-23 | End: 2018-07-31 | Stop reason: SDUPTHER

## 2018-06-23 RX ORDER — PEN NEEDLE, DIABETIC 30 GX3/16"
NEEDLE, DISPOSABLE MISCELLANEOUS
Qty: 120 EACH | Refills: 2 | Status: SHIPPED | OUTPATIENT
Start: 2018-06-23 | End: 2019-11-08 | Stop reason: SDUPTHER

## 2018-06-23 RX ORDER — SPIRONOLACTONE 25 MG/1
12.5 TABLET ORAL DAILY
Qty: 45 TABLET | Refills: 3 | Status: SHIPPED | OUTPATIENT
Start: 2018-06-24 | End: 2021-09-09

## 2018-06-23 RX ADMIN — LISINOPRIL 10 MG: 10 TABLET ORAL at 09:06

## 2018-06-23 RX ADMIN — APIXABAN 5 MG: 5 TABLET, FILM COATED ORAL at 09:06

## 2018-06-23 RX ADMIN — METOPROLOL TARTRATE 50 MG: 50 TABLET, FILM COATED ORAL at 09:06

## 2018-06-23 RX ADMIN — ACETAMINOPHEN 650 MG: 325 TABLET, FILM COATED ORAL at 02:06

## 2018-06-23 RX ADMIN — CLOPIDOGREL 75 MG: 75 TABLET, FILM COATED ORAL at 09:06

## 2018-06-23 RX ADMIN — SPIRONOLACTONE 12.5 MG: 25 TABLET, FILM COATED ORAL at 09:06

## 2018-06-23 RX ADMIN — INSULIN ASPART 2 UNITS: 100 INJECTION, SOLUTION INTRAVENOUS; SUBCUTANEOUS at 09:06

## 2018-06-23 RX ADMIN — FUROSEMIDE 20 MG: 20 TABLET ORAL at 09:06

## 2018-06-23 RX ADMIN — INSULIN ASPART 8 UNITS: 100 INJECTION, SOLUTION INTRAVENOUS; SUBCUTANEOUS at 09:06

## 2018-06-23 RX ADMIN — ASPIRIN 81 MG CHEWABLE TABLET 81 MG: 81 TABLET CHEWABLE at 09:06

## 2018-06-23 RX ADMIN — INSULIN ASPART 8 UNITS: 100 INJECTION, SOLUTION INTRAVENOUS; SUBCUTANEOUS at 02:06

## 2018-06-23 NOTE — ASSESSMENT & PLAN NOTE
Bg goal 140-180    Increase Levemir to 34 units nightly.  Increase Novolog 8 units with meals  Change bg monitoring to ac/hs and moderate dose correction scale.     Discharge planning- Levemir 34 units nightly. Novolog 8 units with meals plus correction scale. Diabetes education and referral to [facility] staff to schedule appointments outpatient. WIll give BG logs and [facility] on call number for questions in the mean time.

## 2018-06-23 NOTE — DISCHARGE INSTRUCTIONS
Progressive increase in activity as tolerated. Follow a healthy diet with regular monitoring of your blood sugar. You may return to work on Thursday, June 28, 2018.

## 2018-06-23 NOTE — ASSESSMENT & PLAN NOTE
41 y/o M with STEMI, triple vessel disease on cardiac cath including proximal LAD. On medical management, heparin, statin, ASA. Wall motion abnormalities noted on Echo, as well as poor R-wave progression on ECG.     -ASA 81mg after loading with ASA on admission.   - High dose lipitor   - continue plavix   -continue eliquis  -continue Lopressor 50 Q12h  - continue Lisinopril 10mg daily    - Continue lasix 20mg qday  - Continue spironolactone  12.5mg qday  - 2D echo with color flow revealed EF 28%    S/p PCI with RYLIE to RCA and ALD on 6/22  - F/u with Dr. Patten x 1 month for cardiac PET

## 2018-06-23 NOTE — PROGRESS NOTES
"[facility]  Endocrinology  Progress Note    Admit Date: 2018     Reason for Consult: Management of type 2 DM, Hyperglycemia     Surgical Procedure and Date: n/a    Diabetes diagnosis year:     Home Diabetes Medications:  Toujeo 30-36 units daily; metformin 1000 mg BID  Lab Results   Component Value Date    HGBA1C 12.8 (H) 2018       How often checking glucose at home? Once; would occasionally not check bg if not feeling well   BG readings on regimen: 140-200s   Hypoglycemia on the regimen?  previously but rare  Missed doses on regimen?  Yes, about 2 times a week     Diabetes Complications include:     Hyperglycemia, Diabetic retinopathy  and Diabetic peripheral neuropathy     Complicating diabetes co morbidities:   History of MI      HPI:   Patient is a 42 y.o. male with a diagnosis of type 2 DM managed with metformin and basal insulin. Also with HTN. Patient presents with mild to moderate midsternal chest discomfort that radiates to the left arm.  It occurs intermittently ×1 week, usually after meals lasting 1-2 hours.  He initially noted it when he was eating.  Also with chest discomfort at rest while sleeping and continuing into today morning. Patient presented to ED with persistent chest pain. Endocrinology consulted for BG and DM management.     Interval HPI:   Overnight events: remains in ICU. NAEON. BG remain elevated on basal and prandial coverage; requiring correction scale. Discharge to home today.   Eatin-75%  Nausea: No  Hypoglycemia and intervention: No  Fever: No  TPN and/or TF: No    BP (!) 99/58 (BP Location: Right arm, Patient Position: Lying)   Pulse 82   Temp 98.1 °F (36.7 °C) (Oral)   Resp (!) 21   Ht 5' 8" (1.727 m)   Wt 89.4 kg (197 lb 1.5 oz)   SpO2 (!) 93%   BMI 29.97 kg/m²       Labs Reviewed and Include      Recent Labs  Lab 18  0500   *   CALCIUM 9.0   *   K 3.8   CO2 23      BUN 4*   CREATININE 0.8     Lab Results "   Component Value Date    WBC 5.73 06/23/2018    HGB 11.0 (L) 06/23/2018    HCT 32.5 (L) 06/23/2018    MCV 85 06/23/2018     06/23/2018     No results for input(s): TSH, FREET4 in the last 168 hours.  Lab Results   Component Value Date    HGBA1C 12.8 (H) 06/18/2018       Nutritional status:   Body mass index is 29.97 kg/m².  Lab Results   Component Value Date    ALBUMIN 2.8 (L) 06/18/2018    ALBUMIN 3.0 (L) 06/17/2018    ALBUMIN 3.4 (L) 06/17/2018     No results found for: PREALBUMIN    Estimated Creatinine Clearance: 128 mL/min (based on SCr of 0.8 mg/dL).    Accu-Checks  Recent Labs      06/20/18   1109  06/20/18   1832  06/20/18   2334  06/21/18   0524  06/21/18   1212  06/21/18   1619  06/21/18   2330  06/22/18   0602  06/22/18   1459  06/22/18   2146   POCTGLUCOSE  284*  328*  222*  232*  191*  146*  304*  259*  153*  200*       Hyperglycemia/Diabetes Medications: Antihyperglycemics     Start     Stop Route Frequency Ordered    06/22/18 2100  insulin detemir U-100 pen 28 Units      -- SubQ Nightly 06/22/18 1524    06/22/18 1645  insulin aspart U-100 pen 6 Units      -- SubQ 3 times daily with meals 06/22/18 1357    06/22/18 1457  insulin aspart U-100 pen 1-10 Units      -- SubQ Before meals & nightly PRN 06/22/18 1357          ASSESSMENT and PLAN    * STEMI (ST elevation myocardial infarction)    Managed per primary.   Avoid hypoglycemia.         Type 2 diabetes mellitus with hyperglycemia    Bg goal 140-180    Increase Levemir to 34 units nightly.  Increase Novolog 8 units with meals  Change bg monitoring to ac/hs and moderate dose correction scale.     Discharge planning- Levemir 34 units nightly. Novolog 8 units with meals plus correction scale. Diabetes education and referral to [facility] staff to schedule appointments outpatient. WIll give BG logs and [facility] on call number for questions in the mean time. RX sent to pharmacy per patient request. Metformin stopped per cardiology.           HTN  (hypertension)    Managed per primary.  If uncontrolled may increase insulin resistance.             Celeste Kerns NP  Endocrinology  [facility]

## 2018-06-23 NOTE — DISCHARGE SUMMARY
[facility]  Cardiology  Discharge Summary      Patient Name: Kathy Vinson  MRN: [id number]  Admission Date: 6/17/2018  Hospital Length of Stay: 6 days  Discharge Date and Time:  06/23/2018 1:41 PM  Attending Physician: Senthil Devi MD    Discharging Provider: Lidia Ramirez MD  Primary Care Physician: Melida Martinez MD    HPI:   42-year-old gentleman admitted to CCU with NSTEMi, triple vessel disease.    Past medical history of hypertension, diabetes presents with mild to moderate midsternal chest discomfort that radiates to the left arm.  It occurs intermittently ×1 week, usually after meals lasting 1-2 hours.  He initially noted it when he was eating.  Since yesterday chest discomfort at rest while sleeping and continuing into today morning. Today at [facility] the CP continued and concerned him enough to come into the emergency department.  Denies nausea, vomiting, shortness of breath, lightheadedness.    Cardiac cath today   Left Main Coronary Artery:             The LM has luminal irregularities. There is RAF 3 flow.     - Left Anterior Descending Artery:             The proximal LAD is occluded (100). There is RAF 0 flow. Collateral filling from right coronary artery.     - Left Circumflex Artery:             The LCX has a 90% stenosis. There is RAF 3 flow.     - Right Coronary Artery:             The RCA has a 90% stenosis. There is RAF 3 flow.     - Common Femoral Artery:             The right CFA is normal.     - Superficial Femoral Artery:             The right proximal SFA has a 70% stenosis.                     Lesion Details:   There is severe thrombus. Saddle thrombus.     - Profunda Femoral Artery:             The right proximal PFA has a 70% stenosis.                     Lesion Details:   There is severe thrombus. Saddle Thrombus    Bedside echo done by me using contrast: EF 30-35%, RV normal function. Mid-anteroseptum akinetic, mid to distal inferospetum akinetic,  apex akinetic and distal anterolateral wall is akinetic, LV apex appears aneurysmal.  No thrombus noted.    Procedure(s) (LRB):  Left heart cath (N/A)     Indwelling Lines/Drains at time of discharge:  Lines/Drains/Airways          No matching active lines, drains, or airways          Hospital Course:  Admitted with STEMI after presenting with chest pain. LHC done on day of admission revealed triple vessel disease and a clot in the common femoral artery. Patient was started on anticoagulation. No angioplasty was done and CTS was consulted for CABG, who requested viability test.  Viability test revealed transient defect of the inferior wall, correlating with viable myocardium. Repeat 2D echo with EF of 28%. On 6/22, patient underwent  PCI and had RYLIE placed to RCA and LAD without any complication.  He was stable for discharge home on 6/23 without CP or SOB.     Consults:   Consults         Status Ordering Provider     Inpatient consult to Cardiothoracic Surgery  Once     Provider:  (Not yet assigned)    Completed LACEY BRITT     Inpatient consult to Endocrinology  Once     Provider:  (Not yet assigned)    Completed FIDELIA TYLER     Inpatient consult to Interventional Cardiology  Once     Provider:  (Not yet assigned)    Completed BARNEY BARNETT        Physical Exam     General: well developed, well nourished, appears to be in NAD  Eyes: conjunctivae/corneas clearEOMI  Neck: supple, symmetrical, trachea midline, no JVD  Cardiovascular: Heart: regular rate and rhythm, S1, S2 normal, no murmur, click, rub or gallop.  Lungs: + bilateral basilar crackles, normal respiratory effort  Chest Wall: no tenderness  Abdomen/Rectal: Abdomen: Non distended. + BS. Extremities: no edema, no redness or tenderness in the calves or thighs. Pulses: 2+ and symmetric  Skin: Skin color, texture, turgor normal. No rashes or lesions  Musculoskeletal: full range of motion of joints  Psych/Behavioral: Normal. Alert and  oriented, appropriate affect.    Significant Diagnostic Studies: Labs:   CMP   Recent Labs  Lab 06/22/18  0618 06/23/18  0500   * 133*   K 4.2 3.8    102   CO2 20* 23   * 236*   BUN 5* 4*   CREATININE 0.8 0.8   CALCIUM 9.4 9.0   ANIONGAP 11 8   ESTGFRAFRICA >60.0 >60.0   EGFRNONAA >60.0 >60.0    and CBC   Recent Labs  Lab 06/22/18  0453 06/23/18  0500   WBC 5.94 5.73   HGB 10.3* 11.0*   HCT 31.9* 32.5*   * 284       Pending Diagnostic Studies:     Procedure Component Value Units Date/Time    Anti-Xa Heparin Monitoring [[id number]] Collected:  06/18/18 1039    Order Status:  Sent Lab Status:  In process Updated:  06/18/18 1039    Specimen:  Blood from Blood           Final Active Diagnoses:    Diagnosis Date Noted POA    PRINCIPAL PROBLEM:  STEMI (ST elevation myocardial infarction) [I21.3] 06/18/2018 Yes    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 On medical management, heparin, statin, ASA. Wall motion abnormalities noted on Echo, as well as poor R-wave progression on ECG.     -ASA 81mg after loading with ASA on admission.   - High dose lipitor   - continue plavix   -continue eliquis  -continue Lopressor 50 Q12h  - continue Lisinopril 10mg daily    - Continue lasix 20mg qday  - Continue spironolactone  12.5mg qday  - 2D echo with color flow revealed EF 28%    S/p PCI with RYLIE to RCA and ALD on 6/22  - F/u with Dr. 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    - in right common femoral   - continue eliquis        Type 2  diabetes mellitus with hyperglycemia    A1c 12.8%    - endocrine consulted and apprecaite recs  - Continue detemir 34U qHS and aspart 8U TIDWM            Discharged Condition: stable    Disposition:     Follow Up:    Patient Instructions:     Ambulatory Referral to Cardiology   Referral Priority: Urgent Referral Type: Consultation   Referral Reason: Specialty Services Required    Referred to Provider: CLINT GROVE Requested Specialty: Cardiology   Number of Visits Requested: 1      Cardiac pet scan viability   Standing Status: Future  Standing Exp. Date: 06/23/19       Medications:  Reconciled Home Medications:      Medication List      START taking these medications    apixaban 5 mg Tab  Take 1 tablet (5 mg total) by mouth 2 (two) times daily.     atorvastatin 80 MG tablet  Commonly known as:  LIPITOR  Take 1 tablet (80 mg total) by mouth every evening.     blood sugar diagnostic Strp  Preferred by insurance. Checking 5 times daily     blood-glucose meter Misc  Preferred by insurance.     clopidogrel 75 mg tablet  Commonly known as:  PLAVIX  Take 1 tablet (75 mg total) by mouth once daily.  Start taking on:  6/24/2018     * insulin aspart U-100 100 unit/mL Inpn pen  Commonly known as:  NovoLOG  Inject 8 Units into the skin 3 (three) times daily with meals.     * insulin aspart U-100 100 unit/mL Inpn pen  Commonly known as:  NovoLOG Flexpen U-100 Insulin  Inject 8 Units into the skin 3 (three) times daily with meals.     * insulin detemir U-100 100 unit/mL (3 mL) Inpn pen  Commonly known as:  LEVEMIR FLEXTOUCH  Inject 34 Units into the skin every evening.     * insulin detemir U-100 100 unit/mL (3 mL) Inpn pen  Commonly known as:  LEVEMIR FLEXTOUCH U-100 INSULN  Inject 34 Units into the skin every evening.     lancets Misc  Preferred by insurance. Checking bg 5 times daily.     metoprolol tartrate 50 MG tablet  Commonly known as:  LOPRESSOR  Take 1 tablet (50 mg total) by mouth 2 (two) times daily.    "  nitroGLYCERIN 0.4 MG SL tablet  Commonly known as:  NITROSTAT  Place 1 tablet (0.4 mg total) under the tongue as needed for Chest pain.     pen needle, diabetic 32 gauge x 5/32" Ndle  Preferred by insurance. Injecting insulin 4 times daily.     spironolactone 25 MG tablet  Commonly known as:  ALDACTONE  Take 0.5 tablets (12.5 mg total) by mouth once daily.  Start taking on:  6/24/2018        * This list has 4 medication(s) that are the same as other medications prescribed for you. Read the directions carefully, and ask your doctor or other care provider to review them with you.            CHANGE how you take these medications    lisinopril 10 MG tablet  Take 1 tablet (10 mg total) by mouth once daily.  Start taking on:  6/24/2018  What changed:  · medication strength  · how much to take        CONTINUE taking these medications    aspirin 81 MG EC tablet  Commonly known as:  ECOTRIN  Take 81 mg by mouth once daily.        STOP taking these medications    insulin aspart protamine-insulin aspart 100 unit/mL (70-30) Inpn pen  Commonly known as:  NovoLOG 70/30     insulin glargine 100 unit/mL (3 mL) Inpn pen  Commonly known as:  LANTUS SOLOSTAR     metFORMIN 500 MG tablet  Commonly known as:  GLUCOPHAGE                Lidia Ramirez MD  Cardiology  [facility]  "

## 2018-06-23 NOTE — NURSING
Patient discharged home today. Denies chest pain or shortness of breath at this time. All discharge medications and paperwork reviewed with patient and his wife with understanding verbalized. IV removed and telemetry monitoring discontinued. Patient stated he wanted to wait for lunch before leaving. After he finished, he was taken off unit by PCT to wife waiting in car. All belongings taken off unit with him.

## 2018-06-23 NOTE — SUBJECTIVE & OBJECTIVE
"Interval HPI:   Overnight events: remains in ICU. NAEON. BG remain elevated on basal and prandial coverage; requiring correction scale. Discharge to home today.   Eatin-75%  Nausea: No  Hypoglycemia and intervention: No  Fever: No  TPN and/or TF: No    BP (!) 99/58 (BP Location: Right arm, Patient Position: Lying)   Pulse 82   Temp 98.1 °F (36.7 °C) (Oral)   Resp (!) 21   Ht 5' 8" (1.727 m)   Wt 89.4 kg (197 lb 1.5 oz)   SpO2 (!) 93%   BMI 29.97 kg/m²     Labs Reviewed and Include      Recent Labs  Lab 18  0500   *   CALCIUM 9.0   *   K 3.8   CO2 23      BUN 4*   CREATININE 0.8     Lab Results   Component Value Date    WBC 5.73 2018    HGB 11.0 (L) 2018    HCT 32.5 (L) 2018    MCV 85 2018     2018     No results for input(s): TSH, FREET4 in the last 168 hours.  Lab Results   Component Value Date    HGBA1C 12.8 (H) 2018       Nutritional status:   Body mass index is 29.97 kg/m².  Lab Results   Component Value Date    ALBUMIN 2.8 (L) 2018    ALBUMIN 3.0 (L) 2018    ALBUMIN 3.4 (L) 2018     No results found for: PREALBUMIN    Estimated Creatinine Clearance: 128 mL/min (based on SCr of 0.8 mg/dL).    Accu-Checks  Recent Labs      18   1109  18   1832  18   2334  18   0524  18   1212  18   1619  18   2330  18   0602  18   1459  18   2146   POCTGLUCOSE  284*  328*  222*  232*  191*  146*  304*  259*  153*  200*       Hyperglycemia/Diabetes Medications: Antihyperglycemics     Start     Stop Route Frequency Ordered    18 2100  insulin detemir U-100 pen 28 Units      -- SubQ Nightly 18 1524    18 1645  insulin aspart U-100 pen 6 Units      -- SubQ 3 times daily with meals 18 1357    18 1457  insulin aspart U-100 pen 1-10 Units      -- SubQ Before meals & nightly PRN 18 1357        "

## 2018-06-23 NOTE — PROGRESS NOTES
"[facility]  Endocrinology  Progress Note    Admit Date: 6/17/2018     Reason for Consult: Management of type 2 DM, Hyperglycemia     Surgical Procedure and Date: n/a    Diabetes diagnosis year: 2012    Home Diabetes Medications:  Toujeo 30-36 units daily; metformin 1000 mg BID  Lab Results   Component Value Date    HGBA1C 12.8 (H) 06/18/2018       How often checking glucose at home? Once; would occasionally not check bg if not feeling well   BG readings on regimen: 140-200s   Hypoglycemia on the regimen?  previously but rare  Missed doses on regimen?  Yes, about 2 times a week     Diabetes Complications include:     Hyperglycemia, Diabetic retinopathy  and Diabetic peripheral neuropathy     Complicating diabetes co morbidities:   History of MI      HPI:   Patient is a 42 y.o. male with a diagnosis of type 2 DM managed with metformin and basal insulin. Also with HTN. Patient presents with mild to moderate midsternal chest discomfort that radiates to the left arm.  It occurs intermittently ×1 week, usually after meals lasting 1-2 hours.  He initially noted it when he was eating.  Also with chest discomfort at rest while sleeping and continuing into today morning. Patient presented to ED with persistent chest pain. Endocrinology consulted for BG and DM management.     Interval HPI:   Overnight events: remains in ICU. LHC today; PCI to RCA and LAD. BG remains above goal.   Eating:   NPO  For LHC  Nausea: No  Hypoglycemia and intervention: No  Fever: No  TPN and/or TF: No    /78 (BP Location: Left arm, Patient Position: Sitting)   Pulse 80   Temp 98.6 °F (37 °C) (Oral)   Resp 18   Ht 5' 8" (1.727 m)   Wt 89.4 kg (197 lb 1.5 oz)   SpO2 95%   BMI 29.97 kg/m²       Labs Reviewed and Include      Recent Labs  Lab 06/22/18  0618   *   CALCIUM 9.4   *   K 4.2   CO2 20*      BUN 5*   CREATININE 0.8     Lab Results   Component Value Date    WBC 5.94 06/22/2018    HGB 10.3 (L) " 06/22/2018    HCT 31.9 (L) 06/22/2018    MCV 88 06/22/2018     (H) 06/22/2018     No results for input(s): TSH, FREET4 in the last 168 hours.  Lab Results   Component Value Date    HGBA1C 12.8 (H) 06/18/2018       Nutritional status:   Body mass index is 29.97 kg/m².  Lab Results   Component Value Date    ALBUMIN 2.8 (L) 06/18/2018    ALBUMIN 3.0 (L) 06/17/2018    ALBUMIN 3.4 (L) 06/17/2018     No results found for: PREALBUMIN    Estimated Creatinine Clearance: 128 mL/min (based on SCr of 0.8 mg/dL).    Accu-Checks  Recent Labs      06/19/18   2129  06/20/18   0724  06/20/18   1109  06/20/18   1832  06/20/18   2334  06/21/18   0524  06/21/18   1212  06/21/18   1619  06/21/18   2330  06/22/18   0602   POCTGLUCOSE  304*  317*  284*  328*  222*  232*  191*  146*  304*  259*       Hyperglycemia/Diabetes Medications: Antihyperglycemics     Start     Stop Route Frequency Ordered    06/21/18 2100  insulin detemir U-100 pen 24 Units      -- SubQ Nightly 06/21/18 1108    06/20/18 1746  insulin aspart U-100 pen 1-10 Units      -- SubQ Before meals & nightly PRN 06/20/18 1653          ASSESSMENT and PLAN    * STEMI (ST elevation myocardial infarction)    Managed per primary.   Avoid hypoglycemia.         Type 2 diabetes mellitus with hyperglycemia    Bg goal 140-180    Increase Levemir to 28 units nightly.  Continue Novolog 6 units with meals if eating >25% once diet resumed.   Change bg monitoring to ac/hs and moderate dose correction scale.     Discharge planning- TBD; will diabetes education - not consistently taking insulin. Would prefer to see provider on [facility] if possible.           HTN (hypertension)    Managed per primary.  If uncontrolled may increase insulin resistance.             Celeste Kerns, NP  Endocrinology  [facility]

## 2018-06-23 NOTE — ASSESSMENT & PLAN NOTE
A1c 12.8%    - endocrine consulted and apprecaite recs  - Continue detemir 34U qHS and aspart 8U TIDWM   Burow's Advancement Flap Text: The defect edges were debeveled with a #15 scalpel blade.  Given the location of the defect and the proximity to free margins a Burow's advancement flap was deemed most appropriate.  Using a sterile surgical marker, the appropriate advancement flap was drawn incorporating the defect and placing the expected incisions within the relaxed skin tension lines where possible.    The area thus outlined was incised deep to adipose tissue with a #15 scalpel blade.  The skin margins were undermined to an appropriate distance in all directions utilizing iris scissors.

## 2018-06-25 ENCOUNTER — TELEPHONE (OUTPATIENT)
Dept: ENDOCRINOLOGY | Facility: CLINIC | Age: 43
End: 2018-06-25

## 2018-06-25 LAB
POC ACTIVATED CLOTTING TIME K: 329 SEC (ref 74–137)
SAMPLE: ABNORMAL

## 2018-06-25 NOTE — TELEPHONE ENCOUNTER
Called patient who stated the insulin prescribed on d/c from hospital is not covered by his insurance. Informed patient to call his PCP to inquire about an alternative insulin that may be covered until he can be established with Christal Lagos. Appointment made for the first available date in July. If any date opens up sooner, will notify patient. Patient verbalized understanding.

## 2018-06-25 NOTE — TELEPHONE ENCOUNTER
Called patient and scheduled first available diabetes education for Mon 7/23/18 at 2:00p with Colleen Bautista and [facility] follow up for 3:00p with Christal Lagos. Patient verbalized understanding. Reminder letters mailed.

## 2018-06-25 NOTE — TELEPHONE ENCOUNTER
----- Message from Greer Schafer sent at 6/25/2018  1:14 PM CDT -----  Contact: Pt Wife  Pt would like to be called back regarding medications that are not covered under his insurance  Med,are novolong and levemir  Pt can be reached at [phone]

## 2018-06-30 ENCOUNTER — NURSE TRIAGE (OUTPATIENT)
Dept: ADMINISTRATIVE | Facility: CLINIC | Age: 43
End: 2018-06-30

## 2018-06-30 NOTE — TELEPHONE ENCOUNTER
Reason for Disposition   Caller has medication question only, adult not sick, and triager answers question    Protocols used: ST MEDICATION QUESTION CALL-A-AH

## 2018-07-20 ENCOUNTER — TELEPHONE (OUTPATIENT)
Dept: ENDOCRINOLOGY | Facility: CLINIC | Age: 43
End: 2018-07-20

## 2018-07-30 ENCOUNTER — TELEPHONE (OUTPATIENT)
Dept: CARDIOLOGY | Facility: CLINIC | Age: 43
End: 2018-07-30

## 2018-07-31 ENCOUNTER — CLINICAL SUPPORT (OUTPATIENT)
Dept: CARDIOLOGY | Facility: CLINIC | Age: 43
End: 2018-07-31
Payer: COMMERCIAL

## 2018-07-31 ENCOUNTER — OFFICE VISIT (OUTPATIENT)
Dept: CARDIOLOGY | Facility: CLINIC | Age: 43
End: 2018-07-31
Payer: COMMERCIAL

## 2018-07-31 VITALS
HEART RATE: 74 BPM | SYSTOLIC BLOOD PRESSURE: 100 MMHG | BODY MASS INDEX: 29.86 KG/M2 | DIASTOLIC BLOOD PRESSURE: 65 MMHG | HEIGHT: 68 IN | WEIGHT: 197.06 LBS | OXYGEN SATURATION: 99 %

## 2018-07-31 DIAGNOSIS — E11.65 TYPE 2 DIABETES MELLITUS WITH HYPERGLYCEMIA, UNSPECIFIED WHETHER LONG TERM INSULIN USE: ICD-10-CM

## 2018-07-31 DIAGNOSIS — I21.3 ST ELEVATION MYOCARDIAL INFARCTION (STEMI), UNSPECIFIED ARTERY: ICD-10-CM

## 2018-07-31 DIAGNOSIS — I25.10 CORONARY ARTERY DISEASE INVOLVING NATIVE CORONARY ARTERY OF NATIVE HEART WITHOUT ANGINA PECTORIS: ICD-10-CM

## 2018-07-31 DIAGNOSIS — I74.9 ARTERIAL OCCLUSION DUE TO THROMBOEMBOLISM: ICD-10-CM

## 2018-07-31 DIAGNOSIS — I10 ESSENTIAL HYPERTENSION: Primary | ICD-10-CM

## 2018-07-31 LAB — DIASTOLIC DYSFUNCTION: NO

## 2018-07-31 PROCEDURE — 3078F DIAST BP <80 MM HG: CPT | Mod: CPTII,S$GLB,, | Performed by: INTERNAL MEDICINE

## 2018-07-31 PROCEDURE — 96372 THER/PROPH/DIAG INJ SC/IM: CPT | Mod: 59,S$GLB,, | Performed by: INTERNAL MEDICINE

## 2018-07-31 PROCEDURE — 99999 PR PBB SHADOW E&M-EST. PATIENT-LVL III: CPT | Mod: PBBFAC,,, | Performed by: INTERNAL MEDICINE

## 2018-07-31 PROCEDURE — 3046F HEMOGLOBIN A1C LEVEL >9.0%: CPT | Mod: CPTII,S$GLB,, | Performed by: INTERNAL MEDICINE

## 2018-07-31 PROCEDURE — 3008F BODY MASS INDEX DOCD: CPT | Mod: CPTII,S$GLB,, | Performed by: INTERNAL MEDICINE

## 2018-07-31 PROCEDURE — 93015 CV STRESS TEST SUPVJ I&R: CPT | Mod: S$GLB,,, | Performed by: INTERNAL MEDICINE

## 2018-07-31 PROCEDURE — 93926 LOWER EXTREMITY STUDY: CPT | Mod: S$GLB,,, | Performed by: INTERNAL MEDICINE

## 2018-07-31 PROCEDURE — 78492 MYOCRD IMG PET MLT RST&STRS: CPT | Mod: S$GLB,,, | Performed by: INTERNAL MEDICINE

## 2018-07-31 PROCEDURE — A9555 RB82 RUBIDIUM: HCPCS | Mod: S$GLB,,, | Performed by: INTERNAL MEDICINE

## 2018-07-31 PROCEDURE — 99215 OFFICE O/P EST HI 40 MIN: CPT | Mod: S$GLB,,, | Performed by: INTERNAL MEDICINE

## 2018-07-31 PROCEDURE — 3074F SYST BP LT 130 MM HG: CPT | Mod: CPTII,S$GLB,, | Performed by: INTERNAL MEDICINE

## 2018-07-31 RX ORDER — INSULIN GLARGINE 300 [IU]/ML
46 INJECTION, SOLUTION SUBCUTANEOUS DAILY
COMMUNITY

## 2018-07-31 RX ORDER — SODIUM CHLORIDE 9 MG/ML
3 INJECTION, SOLUTION INTRAVENOUS CONTINUOUS
Status: CANCELLED | OUTPATIENT
Start: 2018-07-31 | End: 2018-07-31

## 2018-07-31 RX ORDER — DIPHENHYDRAMINE HCL 25 MG
50 CAPSULE ORAL ONCE
Status: CANCELLED | OUTPATIENT
Start: 2018-07-31 | End: 2018-07-31

## 2018-07-31 NOTE — ASSESSMENT & PLAN NOTE
-Repeat arterial US of the right leg showed normal flow and no signs of arterial thrombosis  -Would continue eliquis for 6 months total

## 2018-07-31 NOTE — ASSESSMENT & PLAN NOTE
-Patient is totally asymptomatic at the moment. He is able to perform all his daily activities without any angina, palpitations or diaphoresis.   -Would continue with medical therapy for CAD  -PET stress showed ischemia of the LCX/OM territory therefore we will do another angiogram with plan PCI  -LHC and coronary angiogram via R CFA  -IVF at 3 ml/hour  -Patient is a RYLIE candidate  -The risks, benefits & alternatives of the procedure were explained to the patient.    -The risks of coronary angiography include but are not limited to:  Bleeding, infection, heart rhythm abnormalities, allergic reactions, kidney injury, stroke and death.    -Should stenting be indicated, the patient has agreed to dual anti-platelet therapy for 1-consecutive year with a drug-eluting stent and a minimum of 1-month with the use of a bare metal stent.    -The risks of moderate sedation include hypotension, respiratory depression, arrhythmias, bronchospasm, & death.    -Informed consent was obtained & the patient is agreeable to proceed with the procedure.  -This patient was discussed with the attending interventional cardiologist who agrees with the above assessment & plan.

## 2018-07-31 NOTE — ASSESSMENT & PLAN NOTE
-Controlled today in the office  -Should continue current medical therapy  -Instructed to keep BP log at home and bring it for the next appointment  -Discussed about healthy life style modification including daily exercise 30 min/5 x week, diet (MESH and Mediterranean)improvement according to AHA guidelines. Questions and concerns were properly answered.

## 2018-07-31 NOTE — PROGRESS NOTES
Subjective:    Patient ID:  Kathy Vinson is a 42 y.o. male who presents for follow-up of No chief complaint on file.      HPI    Mr Vinson is a 41 yo man with hypertension, DLPD, who was recently admitted for NSTEMI and s/p LAD PCI with 3.5  X 18 mm RYLIE and mid RCA PCI with 3.0 x 12 mm RYLIE and LCX/Om  who comes for clinical follow up.   Patient was supposed to have a stress test done today to evaluate underlying ischemia of the LCX/Om territory. He also had vascular US given luminal irregularities seen on angiogram.   Today he reports to be doing well and denies anginal symptoms.       Review of Systems   Constitution: Negative.   HENT: Negative.    Eyes: Negative.    Cardiovascular: Negative for chest pain, claudication, cyanosis, dyspnea on exertion, irregular heartbeat, leg swelling, near-syncope, orthopnea, palpitations, paroxysmal nocturnal dyspnea and syncope.   Respiratory: Negative.    Endocrine: Negative.    Gastrointestinal: Negative.    Genitourinary: Negative.         Objective:    Physical Exam   Constitutional: He is oriented to person, place, and time. He appears well-developed and well-nourished.   HENT:   Head: Normocephalic and atraumatic.   Eyes: Conjunctivae are normal.   Neck: Neck supple. No JVD present. No thyromegaly present.   Cardiovascular: Normal rate, regular rhythm, normal heart sounds and intact distal pulses.  Exam reveals no gallop and no friction rub.    No murmur heard.  Pulmonary/Chest: Effort normal and breath sounds normal. No respiratory distress. He has no wheezes. He has no rales.   Abdominal: Soft. Bowel sounds are normal. He exhibits no distension. There is no tenderness. There is no rebound.   Musculoskeletal: Normal range of motion.   Neurological: He is oriented to person, place, and time.   Nursing note reviewed.        Assessment:       1. Essential hypertension    2. ST elevation myocardial infarction (STEMI), unspecified artery    3. Type 2 diabetes mellitus with  hyperglycemia, unspecified whether long term insulin use    4. Coronary artery disease involving native coronary artery of native heart without angina pectoris    5. 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 Questions and concerns were properly answered.         Coronary artery disease involving native coronary artery of native heart  -Patient is totally asymptomatic at the moment. He is able to perform all his daily activities without any angina, palpitations or diaphoresis.   -Would continue with medical therapy for CAD and repeat 2D echo with CFD  -PET stress showed ischemia of the LCX/OM territory therefore we will do another angiogram with plan PCI  -LHC and coronary angiogram via R CFA  -IVF at 3 ml/hour  -Patient is a RYLIE candidate  -The risks, benefits & alternatives of the procedure were explained to the patient.    -The risks of coronary angiography include but are not limited to:  Bleeding, infection, heart rhythm abnormalities, allergic reactions, kidney injury, stroke and death.    -Should stenting be indicated, the patient has agreed to dual anti-platelet therapy for 1-consecutive year with a drug-eluting stent and a minimum of 1-month with the use of a bare metal stent.    -The risks of moderate sedation include hypotension, respiratory depression, arrhythmias, bronchospasm, & death.    -Informed consent was obtained & the patient is agreeable to proceed with the procedure.  -This patient was discussed with the attending interventional cardiologist who agrees with the above assessment & plan.            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 I have discussed and agree with the resident's findings and plan as documented in the resident's note.  Karel Patten

## 2018-08-03 ENCOUNTER — HOSPITAL ENCOUNTER (OUTPATIENT)
Dept: CARDIOLOGY | Facility: CLINIC | Age: 43
Discharge: HOME | End: 2018-08-03
Attending: STUDENT IN AN ORGANIZED HEALTH CARE EDUCATION/TRAINING PROGRAM
Payer: COMMERCIAL

## 2018-08-03 ENCOUNTER — DOCUMENTATION ONLY (OUTPATIENT)
Dept: CARDIOLOGY | Facility: CLINIC | Age: 43
End: 2018-08-03

## 2018-08-03 ENCOUNTER — PATIENT MESSAGE (OUTPATIENT)
Dept: CARDIOLOGY | Facility: CLINIC | Age: 43
End: 2018-08-03

## 2018-08-03 DIAGNOSIS — I25.10 CORONARY ARTERY DISEASE INVOLVING NATIVE CORONARY ARTERY OF NATIVE HEART WITHOUT ANGINA PECTORIS: ICD-10-CM

## 2018-08-03 DIAGNOSIS — I74.9 ARTERIAL OCCLUSION DUE TO THROMBOEMBOLISM: ICD-10-CM

## 2018-08-03 LAB
DIASTOLIC DYSFUNCTION: NO
ESTIMATED PA SYSTOLIC PRESSURE: 29.63
MITRAL VALVE MOBILITY: NORMAL
MITRAL VALVE REGURGITATION: NORMAL
RETIRED EF AND QEF - SEE NOTES: 65 (ref 55–65)
TRICUSPID VALVE REGURGITATION: NORMAL

## 2018-08-03 PROCEDURE — 93306 TTE W/DOPPLER COMPLETE: CPT | Mod: S$GLB,,, | Performed by: INTERNAL MEDICINE

## 2018-08-03 NOTE — PROGRESS NOTES
Two patient identifiers verified. Consent obtained. No prior blood transfusion reaction noted. # 22 g IV placed to right RFA via aseptic technique. 3ml of Optison administered for 2D imaging, patient tolerated well. Imaging completed. IV removed, pressure dressing applied.

## 2018-08-03 NOTE — PROGRESS NOTES
OUTPATIENT CATHETERIZATION INSTRUCTIONS    You have been scheduled for a procedure in the catheterization lab on Thursday, August 9, 2018.     Please report to the Cardiology Waiting Area on the Third floor of the hospital and check in at 7 AM.   You will then be taken to the SSCU (Short Stay Cardiac Unit) and prepared for your procedure. Please be aware that this is not the time of your procedure but the time you are to arrive. The procedures are scheduled on an hourly basis; however, emergency cases take precedence over all other cases.       You may not have anything to eat or drink after midnight the night before your test. You may take your regular morning medications with water. If there are any medications that you should not take you will be instructed to hold them that morning. If you are diabetic and on Metformin (Glucophage) do not take it the day before, the day of, and for 2 days after your procedure.      The procedure will take 1-2 hours to perform. After the procedure, you will return to SSCU on the third floor of the hospital. You will need to lie still (or keep your arm still) for the next 4 to 6 hours to minimize bleeding from the puncture site. Your family may remain in the room with you during this time.       You may be able to be discharged home that same afternoon if there is someone to drive you home and there were no complications. If you have one of the balloon, stent, or device procedures you may spend the night in the hospital. Your doctor will determine, based on your progress, the date and time of your discharge. The results of your procedure will be discussed with you before you are discharged. Any further testing or procedures will be scheduled for you either before you leave or you will be called with these appointments.       If you should have any questions, concerns, or need to change the date of your procedure, please call CARY García @ [phone]    Special  Instructions:    Hold your apixiban (Eliquis) for 2 days before your procedure. Your last dose will be on: Monday, August 6, 2018.               THE ABOVE INSTRUCTIONS WERE GIVEN TO THE PATIENT VERBALLY AND THEY VERBALIZED UNDERSTANDING.  THEY DO NOT REQUIRE ANY SPECIAL NEEDS AND DO NOT HAVE ANY LEARNING BARRIERS.          Directions for Reporting to Cardiology Waiting Area in the Hospital  If you park in the Parking Garage:  Take elevators to the1st floor of the parking garage.  Continue past the gift shop, coffee shop, and piano.  Take a right and go to the gold elevators. (Elevator B)  Take the elevator to the 3rd floor.  Follow the arrow on the sign on the wall that says Cath Lab Registration/EP Lab Registration.  Follow the long hallway all the way around until you come to a big open area.  This is the registration area.  Check in at Reception Desk.    OR    If family is dropping you off:  Have them drop you off at the front of the Hospital under the green overhang.  Enter through the doors and take a right.  Take the E elevators to the 3rd floor Cardiology Waiting Area.  Check in at the Reception Desk in the waiting room.

## 2018-08-06 ENCOUNTER — PATIENT MESSAGE (OUTPATIENT)
Dept: CARDIOLOGY | Facility: CLINIC | Age: 43
End: 2018-08-06

## 2018-08-09 ENCOUNTER — HOSPITAL ENCOUNTER (OUTPATIENT)
Facility: HOSPITAL | Age: 43
Discharge: HOME | End: 2018-08-09
Attending: INTERNAL MEDICINE | Admitting: INTERNAL MEDICINE
Payer: COMMERCIAL

## 2018-08-09 VITALS
TEMPERATURE: 98 F | HEART RATE: 78 BPM | RESPIRATION RATE: 18 BRPM | WEIGHT: 195 LBS | BODY MASS INDEX: 29.55 KG/M2 | SYSTOLIC BLOOD PRESSURE: 98 MMHG | HEIGHT: 68 IN | OXYGEN SATURATION: 95 % | DIASTOLIC BLOOD PRESSURE: 53 MMHG

## 2018-08-09 DIAGNOSIS — I10 ESSENTIAL HYPERTENSION: ICD-10-CM

## 2018-08-09 DIAGNOSIS — I21.3 ST ELEVATION (STEMI) MYOCARDIAL INFARCTION: ICD-10-CM

## 2018-08-09 DIAGNOSIS — I25.10 CORONARY ARTERY DISEASE INVOLVING NATIVE CORONARY ARTERY OF NATIVE HEART WITHOUT ANGINA PECTORIS: ICD-10-CM

## 2018-08-09 LAB
ABO + RH BLD: NORMAL
ANION GAP SERPL CALC-SCNC: 10 MMOL/L
BLD GP AB SCN CELLS X3 SERPL QL: NORMAL
BUN SERPL-MCNC: 11 MG/DL
CALCIUM SERPL-MCNC: 9.5 MG/DL
CHLORIDE SERPL-SCNC: 102 MMOL/L
CO2 SERPL-SCNC: 24 MMOL/L
CREAT SERPL-MCNC: 0.8 MG/DL
EST. GFR  (AFRICAN AMERICAN): >60 ML/MIN/1.73 M^2
EST. GFR  (NON AFRICAN AMERICAN): >60 ML/MIN/1.73 M^2
GLUCOSE SERPL-MCNC: 289 MG/DL
POTASSIUM SERPL-SCNC: 4.6 MMOL/L
SODIUM SERPL-SCNC: 136 MMOL/L

## 2018-08-09 PROCEDURE — 25000003 PHARM REV CODE 250: Performed by: STUDENT IN AN ORGANIZED HEALTH CARE EDUCATION/TRAINING PROGRAM

## 2018-08-09 PROCEDURE — 99152 MOD SED SAME PHYS/QHP 5/>YRS: CPT | Mod: ,,, | Performed by: INTERNAL MEDICINE

## 2018-08-09 PROCEDURE — 92943 PRQ TRLUML REVSC CH OCC ANT: CPT | Mod: LC,,, | Performed by: INTERNAL MEDICINE

## 2018-08-09 PROCEDURE — 80048 BASIC METABOLIC PNL TOTAL CA: CPT

## 2018-08-09 PROCEDURE — 25000003 PHARM REV CODE 250

## 2018-08-09 PROCEDURE — 63600175 PHARM REV CODE 636 W HCPCS

## 2018-08-09 PROCEDURE — G0269 OCCLUSIVE DEVICE IN VEIN ART: HCPCS

## 2018-08-09 PROCEDURE — 86850 RBC ANTIBODY SCREEN: CPT

## 2018-08-09 PROCEDURE — 36415 COLL VENOUS BLD VENIPUNCTURE: CPT

## 2018-08-09 PROCEDURE — 82962 GLUCOSE BLOOD TEST: CPT

## 2018-08-09 RX ORDER — DIPHENHYDRAMINE HCL 50 MG
50 CAPSULE ORAL ONCE
Status: COMPLETED | OUTPATIENT
Start: 2018-08-09 | End: 2018-08-09

## 2018-08-09 RX ORDER — SODIUM CHLORIDE 9 MG/ML
3 INJECTION, SOLUTION INTRAVENOUS CONTINUOUS
Status: ACTIVE | OUTPATIENT
Start: 2018-08-09 | End: 2018-08-09

## 2018-08-09 RX ORDER — SODIUM CHLORIDE 9 MG/ML
INJECTION, SOLUTION INTRAVENOUS CONTINUOUS
Status: DISCONTINUED | OUTPATIENT
Start: 2018-08-09 | End: 2018-08-09 | Stop reason: HOSPADM

## 2018-08-09 RX ADMIN — DIPHENHYDRAMINE HYDROCHLORIDE 50 MG: 50 CAPSULE ORAL at 07:08

## 2018-08-09 RX ADMIN — SODIUM CHLORIDE 3 ML/KG/HR: 0.9 INJECTION, SOLUTION INTRAVENOUS at 07:08

## 2018-08-09 NOTE — PLAN OF CARE
Problem: Patient Care Overview  Goal: Plan of Care Review  Outcome: Ongoing (interventions implemented as appropriate)  Received report from CARY Mcintosh. Patient s/p [facility], AAOx3. VSS, no c/o pain or discomfort at this time, resp even and unlabored. Gauze/tegaderm dressing to B groin is CDI. No active bleeding. No hematoma noted. Post procedure protocol reviewed with patient and patient's family. Understanding verbalized. Family members at bedside. Nurse call bell within reach. Will continue to monitor per post procedure protocol.

## 2018-08-09 NOTE — DISCHARGE SUMMARY
[facility]  Interventional Cardiology  Discharge Summary      Patient Name: Kathy Vinson  MRN: [id number]  Admission Date: 8/9/2018  Hospital Length of Stay: 0 days  Discharge Date and Time: No discharge date for patient encounter.  Attending Physician: Karel Patten MD  Discharging Provider: Korin Fitzgerald MD  Primary Care Physician: Melida Martinez MD    HPI:  Mr Vinson is a 43 yo man with hypertension, DLPD, who was recently admitted for NSTEMI and s/p LAD PCI with 3.5  X 18 mm RYLIE and mid RCA PCI with 3.0 x 12 mm RYLIE and LCX/Om  who comes for clinical follow up.   Patient was supposed to have a stress test done today to evaluate underlying ischemia of the LCX/Om territory. He also had vascular US given luminal irregularities seen on angiogram. Today he reports to be doing well and denies anginal symptoms.        Procedure(s) (LRB):  Left heart cath (Right)     Indwelling Lines/Drains at time of discharge:  Lines/Drains/Airways          No matching active lines, drains, or airways          Hospital Course:  A. Indication/Pre-Operative Diagnosis     The patient is a 42 year old male that was referred for catheterization for dyspnea on exertion.     B. Summary/Post-Operative Diagnosis       Unsuccessful attempt at Cx .    Patent RCA and LAD stents.    C. HPI     I have reviewed the history and physical completed on 08/09/2018. The patient has been examined and I concur with the findings from 08/09/2018.     Patient history was obtained from the patient.     Height: 68 in.      Weight: 195 lbs.      BMI: 29.60 kg/m2    Laboratory data revealed:     08/09/2018 CREAT  0.8, GLU  289, GRPE  A POS, K  4.6, NA  136, BUN  11            Manual Labs:  ACT Reference Range:  sec, ACT-PLUS cartridge Cardiopulmonary Bypass: 410-440 sec, ACT-LR cartridge Indwelling Vascular sheath Removal:  sec  08/09/2018 10:11  , 11:20  , 12:04        D. Hemodynamic  Results       CONDITION 1 (8/9/2018 08:57:43):  LVPEDP: 16       E. Angiographic Results     Diagnostic:          Patient has a right dominant coronary artery.        - Left Main Coronary Artery:             The LM has luminal irregularities. There is RAF 3 flow.     - Left Anterior Descending Artery:             The LAD has luminal irregularities. There is RAF 3 flow. Patent stent.      - Left Circumflex Artery:             The mid LCX has chronic total occlusion. There is RAF 0 flow.     - Right Coronary Artery:             The RCA has luminal irregularities. There is RAF 3 flow. Patent stent.     - Common Femoral Artery:             The right CFA is normal.     - Femoral Artery:             The femoral artery is normal.      Intervention          Mid LCX:              The intervention of the lesion was unsuccessful, secondary to . The vessel has chronic total occlusion, post-RAF 0 flow and TMP grade 0. The vessel was accessed natively.  The following items were used: Cath Emerge Otw 20 X 2.50.    F. Details of Procedure     PROCEDURES PERFORMED:  Repair - Coronary, PTCA (1 vessel), LHC and Coronary Angio    ANESTHESIA: Conscious sedation was achieved with 200 mcg of FENTANYL and 3 mg of MIDAZOLAM (VERSED) 1MG/ML. Local anesthesia was achieved with 20 ml of LIDOCAINE 2% 20ML. Moderate conscious sedation was performed and cardiorespiratory functions were   monitored the entire procedure by Arnaldo Sadler RN. Sedation began at 09:06 AM and concluded at 12:03 PM, totalling 177.3 minutes.         Significant Diagnostic Studies: Angiography    Pending Diagnostic Studies:     None        Coronary artery disease involving native coronary artery of native heart    [facility] today from R Femoral artery   Summary/Post-Operative Diagnosis   Unsuccessful attempt at Cx .  Patent RCA and LAD stents.            Discharged Condition: good    Follow Up:    Patient Instructions:   No discharge procedures on  "file.  Medications:  Reconciled Home Medications:      Medication List      ASK your doctor about these medications    APIDRA SOLOSTAR U-100 INSULIN 100 unit/mL Inpn pen  Generic drug:  insulin glulisine U-100  Inject 10 Units into the skin 3 (three) times daily before meals.     apixaban 5 mg Tab  Take 1 tablet (5 mg total) by mouth 2 (two) times daily.     aspirin 81 MG EC tablet  Commonly known as:  ECOTRIN  Take 81 mg by mouth once daily.     atorvastatin 80 MG tablet  Commonly known as:  LIPITOR  Take 1 tablet (80 mg total) by mouth every evening.     blood sugar diagnostic Strp  Preferred by insurance. Checking 5 times daily     blood-glucose meter Misc  Preferred by insurance.     clopidogrel 75 mg tablet  Commonly known as:  PLAVIX  Take 1 tablet (75 mg total) by mouth once daily.     insulin detemir U-100 100 unit/mL (3 mL) Inpn pen  Commonly known as:  LEVEMIR FLEXTOUCH  Inject 34 Units into the skin every evening.     lancets Misc  Preferred by insurance. Checking bg 5 times daily.     lisinopril 10 MG tablet  Take 1 tablet (10 mg total) by mouth once daily.     metoprolol tartrate 50 MG tablet  Commonly known as:  LOPRESSOR  Take 1 tablet (50 mg total) by mouth 2 (two) times daily.     nitroGLYCERIN 0.4 MG SL tablet  Commonly known as:  NITROSTAT  Place 1 tablet (0.4 mg total) under the tongue as needed for Chest pain.     pen needle, diabetic 32 gauge x 5/32" Ndle  Preferred by insurance. Injecting insulin 4 times daily.     spironolactone 25 MG tablet  Commonly known as:  ALDACTONE  Take 0.5 tablets (12.5 mg total) by mouth once daily.     TOUJEO SOLOSTAR U-300 INSULIN 300 unit/mL (1.5 mL) Inpn pen  Generic drug:  insulin glargine (TOUJEO)  Inject 46 Units into the skin once daily. Add 3 more units if sugar is not where it need to be per PC   Pt took 36 units this morning on 8/9/18            Time spent on the discharge of patient: 30 minutes    Korin Fitzgerald MD  Interventional " Cardiology  [facility]

## 2018-08-09 NOTE — PROGRESS NOTES
Pt is AAOx4 and denies pain.  VSS.  Admit questions completed.  IV access obtained.  Will continue to monitor.

## 2018-08-09 NOTE — CONSULTS
"Cardiac Rehab     Kathy Vinson   [id number]   8/9/2018       Activity taught: Yes    Cardiac Rehab Phase Taught: Phase I & II    Risk Factors-Modifiable: hyperlipidemia, hypertension, exercise, diabetes    Risk Factors-Non modifiable: age, gender, race    Teaching Method: Verbal, Written, Living with Heart Disease    Understanding: Yes    Response: No, patient states he is unable to commit to a rehab program 3 times a week due to his job. He works out of town frequently and would not be able to make his appointments.    Comments: S/P PCI 06/22/2018 and balloon angioplasty today 08/09/2018. Discussed cardiac rehab and risk factor modification.  Educational materials were used in the process and given to the patient. They included "Your Guide to Living with Heart Disease", Phase Two Cardiac Rehabilitation information along with a sample Mediterranean diet.The patient expressed understanding of the teaching and expressed desire to take a role in modifying the risk factors when they return home.      Toy Calles RN  Cardiac Rehab  "

## 2018-08-09 NOTE — DISCHARGE SUMMARY
[facility]  Discharge Summary      Admit Date: 8/9/2018    Discharge Date and Time:  08/09/2018 5:01 PM    Attending Physician: Karel Patten MD     Reason for Admission: Coronary angiogram     Procedures Performed: Procedure(s) (LRB):  Left heart cath (Right)    Hospital Course (synopsis of major diagnoses, care, treatment, and services provided during the course of the hospital stay):   Mr Vinson was admitted for coronary angiogram and PCI of the LCX . There was an unsuccessful attempt at Cx .  Patent RCA and LAD stents.  Patient tolerated well the procedure and there were no complications.   He remained stable and was safely discharged home.     Consults: none    Significant Diagnostic Studies: Labs: All labs within the past 24 hours have been reviewed    Final Diagnoses:    Principal Problem: Coronary artery disease involving native coronary artery of native heart   Secondary Diagnoses:   Active Hospital Problems    Diagnosis  POA    *Coronary artery disease involving native coronary artery of native heart [I25.10]  Yes    Essential hypertension [I10]  Yes      Resolved Hospital Problems    Diagnosis Date Resolved POA   No resolved problems to display.       Discharged Condition: good    Disposition: Home or Self Care    Follow Up/Patient Instructions:     Medications:  Reconciled Home Medications:      Medication List      CONTINUE taking these medications    APIDRA SOLOSTAR U-100 INSULIN 100 unit/mL Inpn pen  Generic drug:  insulin glulisine U-100  Inject 10 Units into the skin 3 (three) times daily before meals.     apixaban 5 mg Tab  Take 1 tablet (5 mg total) by mouth 2 (two) times daily.     aspirin 81 MG EC tablet  Commonly known as:  ECOTRIN  Take 81 mg by mouth once daily.     atorvastatin 80 MG tablet  Commonly known as:  LIPITOR  Take 1 tablet (80 mg total) by mouth every evening.     blood sugar diagnostic Strp  Preferred by insurance. Checking 5 times daily    "  blood-glucose meter Misc  Preferred by insurance.     clopidogrel 75 mg tablet  Commonly known as:  PLAVIX  Take 1 tablet (75 mg total) by mouth once daily.     insulin detemir U-100 100 unit/mL (3 mL) Inpn pen  Commonly known as:  LEVEMIR FLEXTOUCH  Inject 34 Units into the skin every evening.     lancets Misc  Preferred by insurance. Checking bg 5 times daily.     lisinopril 10 MG tablet  Take 1 tablet (10 mg total) by mouth once daily.     metoprolol tartrate 50 MG tablet  Commonly known as:  LOPRESSOR  Take 1 tablet (50 mg total) by mouth 2 (two) times daily.     nitroGLYCERIN 0.4 MG SL tablet  Commonly known as:  NITROSTAT  Place 1 tablet (0.4 mg total) under the tongue as needed for Chest pain.     pen needle, diabetic 32 gauge x 5/32" Ndle  Preferred by insurance. Injecting insulin 4 times daily.     spironolactone 25 MG tablet  Commonly known as:  ALDACTONE  Take 0.5 tablets (12.5 mg total) by mouth once daily.     TOUJEO SOLOSTAR U-300 INSULIN 300 unit/mL (1.5 mL) Inpn pen  Generic drug:  insulin glargine (TOUJEO)  Inject 46 Units into the skin once daily. Add 3 more units if sugar is not where it need to be per PC   Pt took 36 units this morning on 8/9/18          No discharge procedures on file.  Follow-up Information     Karel Patten MD In 2 months.    Specialties:  Cardiology, INTERVENTIONAL CARDIOLOGY  Contact information:  [address]  [address] [place], [place] [address] [phone]                 "

## 2018-08-09 NOTE — NURSING
Pt ambulated around unit without distress or discomfort.  Vss.  sara groin sites remain cdi.  0 bleed, 0 hematoma.  Orders received and initiated to discharge patient.

## 2018-08-09 NOTE — HPI
Mr Vinson is a 43 yo man with hypertension, DLPD, who was recently admitted for NSTEMI and s/p LAD PCI with 3.5  X 18 mm RYLIE and mid RCA PCI with 3.0 x 12 mm RYLIE and LCX/Om  who comes for clinical follow up.   Patient was supposed to have a stress test done today to evaluate underlying ischemia of the LCX/Om territory. He also had vascular US given luminal irregularities seen on angiogram. Today he reports to be doing well and denies anginal symptoms.

## 2018-08-09 NOTE — INTERVAL H&P NOTE
The patient has been examined and the H&P has been reviewed:    I concur with the findings and no changes have occurred since H&P was written.    Anesthesia/Surgery risks, benefits and alternative options discussed and understood by patient/family.          Active Hospital Problems    Diagnosis  POA    Essential hypertension [I10]  Yes      Resolved Hospital Problems    Diagnosis Date Resolved POA   No resolved problems to display.

## 2018-08-09 NOTE — H&P (VIEW-ONLY)
Subjective:    Patient ID:  Kathy Vinson is a 42 y.o. male who presents for follow-up of No chief complaint on file.      HPI    Mr Vinson is a 41 yo man with hypertension, DLPD, who was recently admitted for NSTEMI and s/p LAD PCI with 3.5  X 18 mm RYLIE and mid RCA PCI with 3.0 x 12 mm RYLIE and LCX/Om  who comes for clinical follow up.   Patient was supposed to have a stress test done today to evaluate underlying ischemia of the LCX/Om territory. He also had vascular US given luminal irregularities seen on angiogram.   Today he reports to be doing well and denies anginal symptoms.       Review of Systems   Constitution: Negative.   HENT: Negative.    Eyes: Negative.    Cardiovascular: Negative for chest pain, claudication, cyanosis, dyspnea on exertion, irregular heartbeat, leg swelling, near-syncope, orthopnea, palpitations, paroxysmal nocturnal dyspnea and syncope.   Respiratory: Negative.    Endocrine: Negative.    Gastrointestinal: Negative.    Genitourinary: Negative.         Objective:    Physical Exam   Constitutional: He is oriented to person, place, and time. He appears well-developed and well-nourished.   HENT:   Head: Normocephalic and atraumatic.   Eyes: Conjunctivae are normal.   Neck: Neck supple. No JVD present. No thyromegaly present.   Cardiovascular: Normal rate, regular rhythm, normal heart sounds and intact distal pulses.  Exam reveals no gallop and no friction rub.    No murmur heard.  Pulmonary/Chest: Effort normal and breath sounds normal. No respiratory distress. He has no wheezes. He has no rales.   Abdominal: Soft. Bowel sounds are normal. He exhibits no distension. There is no tenderness. There is no rebound.   Musculoskeletal: Normal range of motion.   Neurological: He is oriented to person, place, and time.   Nursing note reviewed.        Assessment:       1. Essential hypertension    2. ST elevation myocardial infarction (STEMI), unspecified artery    3. Type 2 diabetes mellitus with  hyperglycemia, unspecified whether long term insulin use    4. Coronary artery disease involving native coronary artery of native heart without angina pectoris    5. 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 Questions and concerns were properly answered.         Coronary artery disease involving native coronary artery of native heart  -Patient is totally asymptomatic at the moment. He is able to perform all his daily activities without any angina, palpitations or diaphoresis.   -Would continue with medical therapy for CAD and repeat 2D echo with CFD  -PET stress showed ischemia of the LCX/OM territory therefore we will do another angiogram with plan PCI  -LHC and coronary angiogram via R CFA  -IVF at 3 ml/hour  -Patient is a RYLIE candidate  -The risks, benefits & alternatives of the procedure were explained to the patient.    -The risks of coronary angiography include but are not limited to:  Bleeding, infection, heart rhythm abnormalities, allergic reactions, kidney injury, stroke and death.    -Should stenting be indicated, the patient has agreed to dual anti-platelet therapy for 1-consecutive year with a drug-eluting stent and a minimum of 1-month with the use of a bare metal stent.    -The risks of moderate sedation include hypotension, respiratory depression, arrhythmias, bronchospasm, & death.    -Informed consent was obtained & the patient is agreeable to proceed with the procedure.  -This patient was discussed with the attending interventional cardiologist who agrees with the above assessment & plan.            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 I have discussed and agree with the resident's findings and plan as documented in the resident's note.  Karel Patten     negative...

## 2018-08-09 NOTE — NURSING
Pt d/c'd to home per md orders.  Vss. sara groin sites remains odalys.  0 bleed, 0 hematoma.  pivs removed intact and without difficulty.  Instructed pt and spouse on home medications, post procedure precautions and follow up visits.  Pt and spouse verbalizes understanding.  Pt in no acute distress and verbalizes no complaints.

## 2018-08-09 NOTE — HOSPITAL COURSE
A. Indication/Pre-Operative Diagnosis     The patient is a 42 year old male that was referred for catheterization for dyspnea on exertion.     B. Summary/Post-Operative Diagnosis       Unsuccessful attempt at Cx .    Patent RCA and LAD stents.    C. HPI     I have reviewed the history and physical completed on 08/09/2018. The patient has been examined and I concur with the findings from 08/09/2018.     Patient history was obtained from the patient.     Height: 68 in.      Weight: 195 lbs.      BMI: 29.60 kg/m2    Laboratory data revealed:     08/09/2018 CREAT  0.8, GLU  289, GRPE  A POS, K  4.6, NA  136, BUN  11            Manual Labs:  ACT Reference Range:  sec, ACT-PLUS cartridge Cardiopulmonary Bypass: 410-440 sec, ACT-LR cartridge Indwelling Vascular sheath Removal:  sec  08/09/2018 10:11  , 11:20  , 12:04        D. Hemodynamic Results       CONDITION 1 (8/9/2018 08:57:43):  LVPEDP: 16       E. Angiographic Results     Diagnostic:          Patient has a right dominant coronary artery.        - Left Main Coronary Artery:             The LM has luminal irregularities. There is RAF 3 flow.     - Left Anterior Descending Artery:             The LAD has luminal irregularities. There is RAF 3 flow. Patent stent.      - Left Circumflex Artery:             The mid LCX has chronic total occlusion. There is RAF 0 flow.     - Right Coronary Artery:             The RCA has luminal irregularities. There is RAF 3 flow. Patent stent.     - Common Femoral Artery:             The right CFA is normal.     - Femoral Artery:             The femoral artery is normal.      Intervention          Mid LCX:              The intervention of the lesion was unsuccessful, secondary to . The vessel has chronic total occlusion, post-RAF 0 flow and TMP grade 0. The vessel was accessed natively.  The following items were used: Cath Emerge Otw 20 X 2.50.    F. Details of Procedure     PROCEDURES PERFORMED:   Repair - Coronary, PTCA (1 vessel), LHC and Coronary Angio    ANESTHESIA: Conscious sedation was achieved with 200 mcg of FENTANYL and 3 mg of MIDAZOLAM (VERSED) 1MG/ML. Local anesthesia was achieved with 20 ml of LIDOCAINE 2% 20ML. Moderate conscious sedation was performed and cardiorespiratory functions were   monitored the entire procedure by Arnaldo Sadler RN. Sedation began at 09:06 AM and concluded at 12:03 PM, totalling 177.3 minutes.

## 2018-08-09 NOTE — ASSESSMENT & PLAN NOTE
LHC today from R Femoral artery   Summary/Post-Operative Diagnosis   Unsuccessful attempt at Cx .  Patent RCA and LAD stents.

## 2018-08-10 LAB
POC ACTIVATED CLOTTING TIME K: 219 SEC (ref 74–137)
POC ACTIVATED CLOTTING TIME K: 285 SEC (ref 74–137)
POC ACTIVATED CLOTTING TIME K: 780 SEC (ref 74–137)
POCT GLUCOSE: 231 MG/DL (ref 70–110)
SAMPLE: ABNORMAL

## 2018-08-13 LAB — CORONARY STENOSIS: ABNORMAL

## 2018-10-12 ENCOUNTER — TELEPHONE (OUTPATIENT)
Dept: PODIATRY | Facility: CLINIC | Age: 43
End: 2018-10-12

## 2018-12-03 ENCOUNTER — OFFICE VISIT (OUTPATIENT)
Dept: CARDIOLOGY | Facility: CLINIC | Age: 43
End: 2018-12-03
Payer: COMMERCIAL

## 2018-12-03 VITALS
HEART RATE: 80 BPM | SYSTOLIC BLOOD PRESSURE: 143 MMHG | WEIGHT: 196.88 LBS | OXYGEN SATURATION: 100 % | BODY MASS INDEX: 29.84 KG/M2 | DIASTOLIC BLOOD PRESSURE: 90 MMHG | HEIGHT: 68 IN

## 2018-12-03 DIAGNOSIS — E11.65 TYPE 2 DIABETES MELLITUS WITH HYPERGLYCEMIA, UNSPECIFIED WHETHER LONG TERM INSULIN USE: ICD-10-CM

## 2018-12-03 DIAGNOSIS — I10 HYPERTENSION: ICD-10-CM

## 2018-12-03 DIAGNOSIS — I21.3 ST ELEVATION MYOCARDIAL INFARCTION (STEMI), UNSPECIFIED ARTERY: Primary | ICD-10-CM

## 2018-12-03 DIAGNOSIS — I10 ESSENTIAL HYPERTENSION: ICD-10-CM

## 2018-12-03 PROCEDURE — 99214 OFFICE O/P EST MOD 30 MIN: CPT | Mod: S$GLB,,, | Performed by: INTERNAL MEDICINE

## 2018-12-03 PROCEDURE — 99999 PR PBB SHADOW E&M-EST. PATIENT-LVL IV: CPT | Mod: PBBFAC,,, | Performed by: INTERNAL MEDICINE

## 2018-12-03 PROCEDURE — 3008F BODY MASS INDEX DOCD: CPT | Mod: CPTII,S$GLB,, | Performed by: INTERNAL MEDICINE

## 2018-12-03 PROCEDURE — 93000 ELECTROCARDIOGRAM COMPLETE: CPT | Mod: S$GLB,,, | Performed by: INTERNAL MEDICINE

## 2018-12-03 PROCEDURE — 3046F HEMOGLOBIN A1C LEVEL >9.0%: CPT | Mod: CPTII,S$GLB,, | Performed by: INTERNAL MEDICINE

## 2018-12-03 NOTE — PROGRESS NOTES
Subjective:    Patient ID:  Kathy Vinson is a 43 y.o. male who presents for follow-up of Coronary Artery Disease      HPI     Referred by Dr Patten  Mr Vinson is a 43 yo man with hypertension, DLPD, who was recently admitted for NSTEMI and s/p LAD PCI with 3.5  X 18 mm RYLIE and mid RCA PCI with 3.0 x 12 mm RYLIE and LCX/Om  who comes for clinical follow up.   Patient was supposed to have a stress test done today to evaluate underlying ischemia of the LCX/Om territory. He also had vascular US given luminal irregularities seen on angiogram.   Today he reports to be doing well and denies anginal symptoms.      Coronary artery disease involving native coronary artery of native heart  -Patient is totally asymptomatic at the moment. He is able to perform all his daily activities without any angina, palpitations or diaphoresis.   -Would continue with medical therapy for CAD and repeat 2D echo with CFD  -PET stress showed ischemia of the LCX/OM territory therefore we will do another angiogram with plan PCI  -LHC and coronary angiogram via R CFA  -IVF at 3 ml/hour  -Patient is a RYLIE candidate  -The risks, benefits & alternatives of the procedure were explained to the patient.    -The risks of coronary angiography include but are not limited to:  Bleeding, infection, heart rhythm abnormalities, allergic reactions, kidney injury, stroke and death.    -Should stenting be indicated, the patient has agreed to dual anti-platelet therapy for 1-consecutive year with a drug-eluting stent and a minimum of 1-month with the use of a bare metal stent.    -The risks of moderate sedation include hypotension, respiratory depression, arrhythmias, bronchospasm, & death.    -Informed consent was obtained & the patient is agreeable to proceed with the procedure.  -This patient was discussed with the attending interventional cardiologist who agrees with the above assessment & plan.                Arterial occlusion due to thromboembolism  -Repeat  arterial US of the right leg showed normal flow and no signs of arterial thrombosis  -Would continue eliquis for 6 months total    Admitted 8/9/18  Hospital Course (synopsis of major diagnoses, care, treatment, and services provided during the course of the hospital stay):   Mr Vinson was admitted for coronary angiogram and PCI of the LCX . There was an unsuccessful attempt at Cx .  Patent RCA and LAD stents.  Patient tolerated well the procedure and there were no complications.   He remained stable and was safely discharged home.     Echo 8/30/18    1 - Normal left ventricular systolic function (EF 60-65%).     2 - Concentric hypertrophy.     3 - Normal left ventricular diastolic function.     4 - Normal right ventricular systolic function .     5 - The estimated PA systolic pressure is 30 mmHg.     6 - Mild mitral regurgitation.     7 - Trivial to mild tricuspid regurgitation.     8 - Mild left atrial enlargement.     9 - No wall motion abnormalities.     PET stress 7/31/18  At rest:  LVEF: 57 % (normal is >= 51%)  LVED Volume: 110 ml (normal is <=171)  LVES Volume: 47 ml (normal is <=70)  At stress:  LVEF: 54 % (normal is >= 51%)  LVED Volume: 144 ml (normal is <=171)  LVES Volume: 67 ml (normal is <=70)    CONCLUSIONS: ABNORMAL MYOCARDIAL PERFUSION PET STRESS TEST  1. There is a small sized mildly reversbile defect with a small amount of fixed component in the base to mid inferolateral wall in the usual distribution of the mid Left Circumflex Artery. This defect comprises 10 % of the left ventricular myocardium.    Although there is reversibility in this region, coronary flow capacity is above ischemic thresholds.   2. There is a moderate sized mildly reversbile defect in the mid to apical septal wall in the usual distribution of the mid Left Anterior Descending Artery. This defect comprises 15 % of the left ventricular myocardium. Although there is reversibility in   this region, coronary flow capacity is  above ischemic thresholds. The proximal LAD terrtory demonstrates normal perfusion and has excellent coronary flow capacity, consistent with patent proximal LAD stent.   3. Resting wall motion is physiologic. Stress wall motion is physiologic.   4. LV function is normal at rest and stress.  (normal is >= 51%)    Has been on eliquis for 6 months    Denies CP or SOB  Did not do cardiac rehab  EKG NSR PRWP      Review of Systems   Constitution: Negative for decreased appetite.   HENT: Negative for ear discharge.    Eyes: Negative for blurred vision.   Endocrine: Negative for polyphagia.   Skin: Negative for nail changes.   Neurological: Negative for aphonia.   Psychiatric/Behavioral: Negative for hallucinations.        Objective:    Physical Exam   Constitutional: He is oriented to person, place, and time. He appears well-developed and well-nourished.   HENT:   Head: Normocephalic and atraumatic.   Eyes: Conjunctivae are normal.   Neck: Neck supple. No JVD present. No thyromegaly present.   Cardiovascular: Normal rate, regular rhythm, normal heart sounds and intact distal pulses. Exam reveals no gallop and no friction rub.   No murmur heard.  Pulmonary/Chest: Effort normal and breath sounds normal. No respiratory distress. He has no wheezes. He has no rales.   Abdominal: Soft. Bowel sounds are normal. He exhibits no distension. There is no tenderness. There is no rebound.   Musculoskeletal: Normal range of motion.   Neurological: He is oriented to person, place, and time.   Nursing note reviewed.        Assessment:       1. ST elevation myocardial infarction (STEMI), unspecified artery    2. Essential hypertension    3. Type 2 diabetes mellitus with hyperglycemia, unspecified whether long term insulin use         Plan:       Stop eliquis since he has been treated for 6 months due to arterial embolus  Cardiac stable  Declines cardiac rehab  OV 3 months

## 2019-03-14 ENCOUNTER — OFFICE VISIT (OUTPATIENT)
Dept: CARDIOLOGY | Facility: CLINIC | Age: 44
End: 2019-03-14
Payer: COMMERCIAL

## 2019-03-14 VITALS
DIASTOLIC BLOOD PRESSURE: 74 MMHG | HEART RATE: 84 BPM | SYSTOLIC BLOOD PRESSURE: 137 MMHG | WEIGHT: 198.44 LBS | BODY MASS INDEX: 30.07 KG/M2 | OXYGEN SATURATION: 97 % | HEIGHT: 68 IN

## 2019-03-14 DIAGNOSIS — I21.3 ST ELEVATION MYOCARDIAL INFARCTION (STEMI), UNSPECIFIED ARTERY: Primary | ICD-10-CM

## 2019-03-14 DIAGNOSIS — I74.9 ARTERIAL OCCLUSION DUE TO THROMBOEMBOLISM: ICD-10-CM

## 2019-03-14 DIAGNOSIS — I10 ESSENTIAL HYPERTENSION: ICD-10-CM

## 2019-03-14 DIAGNOSIS — E11.65 TYPE 2 DIABETES MELLITUS WITH HYPERGLYCEMIA, UNSPECIFIED WHETHER LONG TERM INSULIN USE: ICD-10-CM

## 2019-03-14 DIAGNOSIS — I10 HTN (HYPERTENSION): ICD-10-CM

## 2019-03-14 DIAGNOSIS — I25.10 CORONARY ARTERY DISEASE INVOLVING NATIVE CORONARY ARTERY OF NATIVE HEART WITHOUT ANGINA PECTORIS: ICD-10-CM

## 2019-03-14 PROCEDURE — 3078F PR MOST RECENT DIASTOLIC BLOOD PRESSURE < 80 MM HG: ICD-10-PCS | Mod: CPTII,S$GLB,, | Performed by: INTERNAL MEDICINE

## 2019-03-14 PROCEDURE — 99214 PR OFFICE/OUTPT VISIT, EST, LEVL IV, 30-39 MIN: ICD-10-PCS | Mod: S$GLB,,, | Performed by: INTERNAL MEDICINE

## 2019-03-14 PROCEDURE — 99999 PR PBB SHADOW E&M-EST. PATIENT-LVL IV: CPT | Mod: PBBFAC,,, | Performed by: INTERNAL MEDICINE

## 2019-03-14 PROCEDURE — 3046F PR MOST RECENT HEMOGLOBIN A1C LEVEL > 9.0%: ICD-10-PCS | Mod: CPTII,S$GLB,, | Performed by: INTERNAL MEDICINE

## 2019-03-14 PROCEDURE — 99214 OFFICE O/P EST MOD 30 MIN: CPT | Mod: S$GLB,,, | Performed by: INTERNAL MEDICINE

## 2019-03-14 PROCEDURE — 93000 ELECTROCARDIOGRAM COMPLETE: CPT | Mod: S$GLB,,, | Performed by: INTERNAL MEDICINE

## 2019-03-14 PROCEDURE — 93000 EKG 12-LEAD: ICD-10-PCS | Mod: S$GLB,,, | Performed by: INTERNAL MEDICINE

## 2019-03-14 PROCEDURE — 3046F HEMOGLOBIN A1C LEVEL >9.0%: CPT | Mod: CPTII,S$GLB,, | Performed by: INTERNAL MEDICINE

## 2019-03-14 PROCEDURE — 3075F PR MOST RECENT SYSTOLIC BLOOD PRESS GE 130-139MM HG: ICD-10-PCS | Mod: CPTII,S$GLB,, | Performed by: INTERNAL MEDICINE

## 2019-03-14 PROCEDURE — 3008F BODY MASS INDEX DOCD: CPT | Mod: CPTII,S$GLB,, | Performed by: INTERNAL MEDICINE

## 2019-03-14 PROCEDURE — 3008F PR BODY MASS INDEX (BMI) DOCUMENTED: ICD-10-PCS | Mod: CPTII,S$GLB,, | Performed by: INTERNAL MEDICINE

## 2019-03-14 PROCEDURE — 3078F DIAST BP <80 MM HG: CPT | Mod: CPTII,S$GLB,, | Performed by: INTERNAL MEDICINE

## 2019-03-14 PROCEDURE — 99999 PR PBB SHADOW E&M-EST. PATIENT-LVL IV: ICD-10-PCS | Mod: PBBFAC,,, | Performed by: INTERNAL MEDICINE

## 2019-03-14 PROCEDURE — 3075F SYST BP GE 130 - 139MM HG: CPT | Mod: CPTII,S$GLB,, | Performed by: INTERNAL MEDICINE

## 2019-03-14 RX ORDER — SILDENAFIL 50 MG/1
50 TABLET, FILM COATED ORAL DAILY PRN
Qty: 10 TABLET | Refills: 10 | Status: SHIPPED | OUTPATIENT
Start: 2019-03-14 | End: 2020-03-13

## 2019-03-14 NOTE — PROGRESS NOTES
Subjective:    Patient ID:  Kathy Vinson is a 43 y.o. male who presents for follow-up of Coronary Artery Disease      HPI     Referred by Dr Patten  Mr Vinson is a 43 yo man with hypertension, DLPD, who was recently admitted for NSTEMI and s/p LAD PCI with 3.5  X 18 mm RYLIE and mid RCA PCI with 3.0 x 12 mm RYLIE and LCX/Om  who comes for clinical follow up.   Patient was supposed to have a stress test done today to evaluate underlying ischemia of the LCX/Om territory. He also had vascular US given luminal irregularities seen on angiogram.   Today he reports to be doing well and denies anginal symptoms.      Coronary artery disease involving native coronary artery of native heart  -Patient is totally asymptomatic at the moment. He is able to perform all his daily activities without any angina, palpitations or diaphoresis.   -Would continue with medical therapy for CAD and repeat 2D echo with CFD  -PET stress showed ischemia of the LCX/OM territory therefore we will do another angiogram with plan PCI  -LHC and coronary angiogram via R CFA  -IVF at 3 ml/hour  -Patient is a RYLIE candidate  -The risks, benefits & alternatives of the procedure were explained to the patient.    -The risks of coronary angiography include but are not limited to:  Bleeding, infection, heart rhythm abnormalities, allergic reactions, kidney injury, stroke and death.    -Should stenting be indicated, the patient has agreed to dual anti-platelet therapy for 1-consecutive year with a drug-eluting stent and a minimum of 1-month with the use of a bare metal stent.    -The risks of moderate sedation include hypotension, respiratory depression, arrhythmias, bronchospasm, & death.    -Informed consent was obtained & the patient is agreeable to proceed with the procedure.  -This patient was discussed with the attending interventional cardiologist who agrees with the above assessment & plan.                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 There was an unsuccessful attempt at Cx .  Patent RCA and LAD stents.  Patient tolerated well the procedure and there were no complications.   He remained stable and was safely discharged home.      Echo 8/30/18    1 - Normal left ventricular systolic function (EF 60-65%).     2 - Concentric hypertrophy.     3 - Normal left ventricular diastolic function.     4 - Normal right ventricular systolic function .     5 - The estimated PA systolic pressure is 30 mmHg.     6 - Mild mitral regurgitation.     7 - Trivial to mild tricuspid regurgitation.     8 - Mild left atrial enlargement.     9 - No wall motion abnormalities.      PET stress 7/31/18  At rest:  LVEF: 57 % (normal is >= 51%)  LVED Volume: 110 ml (normal is <=171)  LVES Volume: 47 ml (normal is <=70)  At stress:  LVEF: 54 % (normal is >= 51%)  LVED Volume: 144 ml (normal is <=171)  LVES Volume: 67 ml (normal is <=70)    CONCLUSIONS: ABNORMAL MYOCARDIAL PERFUSION PET STRESS TEST  1. There is a small sized mildly reversbile defect with a small amount of fixed component in the base to mid inferolateral wall in the usual distribution of the mid Left Circumflex Artery. This defect comprises 10 % of the left ventricular myocardium.    Although there is reversibility in this region, coronary flow capacity is above ischemic thresholds.   2. There is a moderate sized mildly reversbile defect in the mid to apical septal wall in the usual distribution of the mid Left Anterior Descending Artery. This defect comprises 15 % of the left ventricular myocardium. Although there is reversibility in   this region, coronary flow capacity  is above ischemic thresholds. The proximal LAD terrtory demonstrates normal perfusion and has excellent coronary flow capacity, consistent with patent proximal LAD stent.   3. Resting wall motion is physiologic. Stress wall motion is physiologic.   4. LV function is normal at rest and stress.  (normal is >= 51%)     12/3/18 Has been on eliquis for 6 months  Denies CP or SOB  Did not do cardiac rehab  EKG NSR PRWP  Stop eliquis since he has been treated for 6 months due to arterial embolus  Cardiac stable  Declines cardiac rehab    Gets occasional CP improved with NTG  C/O ED  EKG NSR PRWP - similar to previous      Review of Systems   Constitution: Negative for decreased appetite.   HENT: Negative for ear discharge.    Eyes: Negative for blurred vision.   Endocrine: Negative for polyphagia.   Skin: Negative for nail changes.   Genitourinary: Negative for bladder incontinence.   Neurological: Negative for aphonia.   Psychiatric/Behavioral: Negative for hallucinations.   Allergic/Immunologic: Negative for hives.        Objective:    Physical Exam   Constitutional: He is oriented to person, place, and time. He appears well-developed and well-nourished.   HENT:   Head: Normocephalic and atraumatic.   Eyes: Conjunctivae are normal.   Neck: Neck supple. No JVD present. No thyromegaly present.   Cardiovascular: Normal rate, regular rhythm, normal heart sounds and intact distal pulses. Exam reveals no gallop and no friction rub.   No murmur heard.  Pulmonary/Chest: Effort normal and breath sounds normal. No respiratory distress. He has no wheezes. He has no rales.   Abdominal: Soft. Bowel sounds are normal. He exhibits no distension. There is no tenderness. There is no rebound.   Musculoskeletal: Normal range of motion.   Neurological: He is oriented to person, place, and time.   Nursing note reviewed.        Assessment:       1. ST elevation myocardial infarction (STEMI), unspecified artery    2. Essential hypertension     3. Coronary artery disease involving native coronary artery of native heart without angina pectoris    4. 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 Type 2 diabetes mellitus with hyperglycemia, unspecified whether long term insulin use         Plan:       Rx for viagra - instructed not to take with NTG  OV 6 months with echo and exercise myoview

## 2019-07-22 ENCOUNTER — OFFICE VISIT (OUTPATIENT)
Dept: CARDIOLOGY | Facility: CLINIC | Age: 44
End: 2019-07-22
Payer: COMMERCIAL

## 2019-07-22 VITALS
SYSTOLIC BLOOD PRESSURE: 118 MMHG | BODY MASS INDEX: 30.74 KG/M2 | DIASTOLIC BLOOD PRESSURE: 64 MMHG | WEIGHT: 202.81 LBS | OXYGEN SATURATION: 99 % | HEIGHT: 68 IN | HEART RATE: 76 BPM

## 2019-07-22 DIAGNOSIS — I25.10 CORONARY ARTERY DISEASE INVOLVING NATIVE CORONARY ARTERY OF NATIVE HEART WITHOUT ANGINA PECTORIS: ICD-10-CM

## 2019-07-22 DIAGNOSIS — E11.65 TYPE 2 DIABETES MELLITUS WITH HYPERGLYCEMIA, WITHOUT LONG-TERM CURRENT USE OF INSULIN: ICD-10-CM

## 2019-07-22 DIAGNOSIS — I74.9 ARTERIAL OCCLUSION DUE TO THROMBOEMBOLISM: ICD-10-CM

## 2019-07-22 DIAGNOSIS — I10 ESSENTIAL HYPERTENSION: ICD-10-CM

## 2019-07-22 DIAGNOSIS — I21.3 ST ELEVATION MYOCARDIAL INFARCTION (STEMI), UNSPECIFIED ARTERY: Primary | ICD-10-CM

## 2019-07-22 DIAGNOSIS — R06.02 SOB (SHORTNESS OF BREATH): ICD-10-CM

## 2019-07-22 PROCEDURE — 99999 PR PBB SHADOW E&M-EST. PATIENT-LVL III: ICD-10-PCS | Mod: PBBFAC,,, | Performed by: INTERNAL MEDICINE

## 2019-07-22 PROCEDURE — 3074F SYST BP LT 130 MM HG: CPT | Mod: CPTII,S$GLB,, | Performed by: INTERNAL MEDICINE

## 2019-07-22 PROCEDURE — 93000 EKG 12-LEAD: ICD-10-PCS | Mod: S$GLB,,, | Performed by: INTERNAL MEDICINE

## 2019-07-22 PROCEDURE — 99999 PR PBB SHADOW E&M-EST. PATIENT-LVL III: CPT | Mod: PBBFAC,,, | Performed by: INTERNAL MEDICINE

## 2019-07-22 PROCEDURE — 3078F DIAST BP <80 MM HG: CPT | Mod: CPTII,S$GLB,, | Performed by: INTERNAL MEDICINE

## 2019-07-22 PROCEDURE — 3074F PR MOST RECENT SYSTOLIC BLOOD PRESSURE < 130 MM HG: ICD-10-PCS | Mod: CPTII,S$GLB,, | Performed by: INTERNAL MEDICINE

## 2019-07-22 PROCEDURE — 3008F BODY MASS INDEX DOCD: CPT | Mod: CPTII,S$GLB,, | Performed by: INTERNAL MEDICINE

## 2019-07-22 PROCEDURE — 93000 ELECTROCARDIOGRAM COMPLETE: CPT | Mod: S$GLB,,, | Performed by: INTERNAL MEDICINE

## 2019-07-22 PROCEDURE — 99214 PR OFFICE/OUTPT VISIT, EST, LEVL IV, 30-39 MIN: ICD-10-PCS | Mod: S$GLB,,, | Performed by: INTERNAL MEDICINE

## 2019-07-22 PROCEDURE — 3008F PR BODY MASS INDEX (BMI) DOCUMENTED: ICD-10-PCS | Mod: CPTII,S$GLB,, | Performed by: INTERNAL MEDICINE

## 2019-07-22 PROCEDURE — 3078F PR MOST RECENT DIASTOLIC BLOOD PRESSURE < 80 MM HG: ICD-10-PCS | Mod: CPTII,S$GLB,, | Performed by: INTERNAL MEDICINE

## 2019-07-22 PROCEDURE — 99214 OFFICE O/P EST MOD 30 MIN: CPT | Mod: S$GLB,,, | Performed by: INTERNAL MEDICINE

## 2019-07-22 NOTE — PROGRESS NOTES
Subjective:    Patient ID:  Kathy Vinson is a 43 y.o. male who presents for follow-up of Hypertension      HPI     CAD - NSTEMI RYLIE to LAD and RCA -  Cx  6/22/18, HTN, DM, HLD, LE arterial embolism - s/p 6 months of eliquis       Referred by Dr Patten  Mr Vinson is a 41 yo man with hypertension, DLPD, who was recently admitted for NSTEMI and s/p LAD PCI with 3.5  X 18 mm RYLIE and mid RCA PCI with 3.0 x 12 mm RYLIE and LCX/Om  who comes for clinical follow up.   Patient was supposed to have a stress test done today to evaluate underlying ischemia of the LCX/Om territory. He also had vascular US given luminal irregularities seen on angiogram.   Today he reports to be doing well and denies anginal symptoms.      Coronary artery disease involving native coronary artery of native heart  -Patient is totally asymptomatic at the moment. He is able to perform all his daily activities without any angina, palpitations or diaphoresis.   -Would continue with medical therapy for CAD and repeat 2D echo with CFD  -PET stress showed ischemia of the LCX/OM territory therefore we will do another angiogram with plan PCI  -LHC and coronary angiogram via R CFA  -IVF at 3 ml/hour  -Patient is a RYLIE candidate  -The risks, benefits & alternatives of the procedure were explained to the patient.    -The risks of coronary angiography include but are not limited to:  Bleeding, infection, heart rhythm abnormalities, allergic reactions, kidney injury, stroke and death.    -Should stenting be indicated, the patient has agreed to dual anti-platelet therapy for 1-consecutive year with a drug-eluting stent and a minimum of 1-month with the use of a bare metal stent.    -The risks of moderate sedation include hypotension, respiratory depression, arrhythmias, bronchospasm, & death.    -Informed consent was obtained & the patient is agreeable to proceed with the procedure.  -This patient was discussed with the attending interventional cardiologist who  agrees with the above assessment & plan.                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 There was an unsuccessful attempt at Cx .  Patent RCA and LAD stents.  Patient tolerated well the procedure and there were no complications.   He remained stable and was safely discharged home.      Echo 8/30/18    1 - Normal left ventricular systolic function (EF 60-65%).     2 - Concentric hypertrophy.     3 - Normal left ventricular diastolic function.     4 - Normal right ventricular systolic function .     5 - The estimated PA systolic pressure is 30 mmHg.     6 - Mild mitral regurgitation.     7 - Trivial to mild tricuspid regurgitation.     8 - Mild left atrial enlargement.     9 - No wall motion abnormalities.      PET stress 7/31/18  At rest:  LVEF: 57 % (normal is >= 51%)  LVED Volume: 110 ml (normal is <=171)  LVES Volume: 47 ml (normal is <=70)  At stress:  LVEF: 54 % (normal is >= 51%)  LVED Volume: 144 ml (normal is <=171)  LVES Volume: 67 ml (normal is <=70)    CONCLUSIONS: ABNORMAL MYOCARDIAL PERFUSION PET STRESS TEST  1. There is a small sized mildly reversbile defect with a small amount of fixed component in the base to mid inferolateral wall in the usual distribution of the mid Left Circumflex Artery. This defect comprises 10 % of the left ventricular myocardium.    Although there is reversibility in this region, coronary flow capacity is above ischemic thresholds.   2. There is a moderate sized mildly reversbile defect in the mid to apical septal wall in the usual distribution of the mid Left Anterior Descending Artery. This defect comprises 15 % of  the left ventricular myocardium. Although there is reversibility in   this region, coronary flow capacity is above ischemic thresholds. The proximal LAD terrtory demonstrates normal perfusion and has excellent coronary flow capacity, consistent with patent proximal LAD stent.   3. Resting wall motion is physiologic. Stress wall motion is physiologic.   4. LV function is normal at rest and stress.  (normal is >= 51%)     12/3/18 Has been on eliquis for 6 months  Denies CP or SOB  Did not do cardiac rehab  EKG NSR PRWP  Stop eliquis since he has been treated for 6 months due to arterial embolus  Cardiac stable  Declines cardiac rehab     3/14/19 Gets occasional CP improved with NTG  C/O ED  EKG NSR PRWP - similar to previous   Rx for viagra - instructed not to take with NTG  OV 6 months with echo and exercise myoview    EKG NSR PRWP  Denies significant CP or SOB  Reports some numbness and tingling on occasion in his RLE    Review of Systems   Constitution: Negative for decreased appetite.   HENT: Negative for ear discharge.    Eyes: Negative for blurred vision.   Endocrine: Negative for polyphagia.   Skin: Negative for nail changes.   Genitourinary: Negative for bladder incontinence.   Neurological: Negative for aphonia.   Psychiatric/Behavioral: Negative for hallucinations.   Allergic/Immunologic: Negative for hives.        Objective:    Physical Exam   Constitutional: He is oriented to person, place, and time. He appears well-developed and well-nourished.   HENT:   Head: Normocephalic and atraumatic.   Eyes: Pupils are equal, round, and reactive to light. Conjunctivae are normal.   Neck: Normal range of motion. Neck supple.   Cardiovascular: Normal rate, normal heart sounds and intact distal pulses.   Pulmonary/Chest: Effort normal and breath sounds normal.   Abdominal: Soft. Bowel sounds are normal.   Musculoskeletal: Normal range of motion.   Neurological: He is alert and oriented to person, place, and time.    Skin: Skin is warm and dry.   2+ peripheral pulses      Assessment:       1. ST elevation myocardial infarction (STEMI), unspecified artery    2. Essential hypertension    3. Coronary artery disease involving native coronary artery of native heart without angina pectoris    4. 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 Type 2 diabetes mellitus with hyperglycemia, without long-term current use of insulin         Plan:       Add FRANCISCO to upcoming echo and stress test

## 2019-08-02 ENCOUNTER — HOSPITAL ENCOUNTER (OUTPATIENT)
Dept: CARDIOLOGY | Facility: HOSPITAL | Age: 44
Discharge: HOME | End: 2019-08-02
Attending: INTERNAL MEDICINE
Payer: COMMERCIAL

## 2019-08-02 ENCOUNTER — HOSPITAL ENCOUNTER (OUTPATIENT)
Dept: RADIOLOGY | Facility: HOSPITAL | Age: 44
Discharge: HOME | End: 2019-08-02
Attending: INTERNAL MEDICINE
Payer: COMMERCIAL

## 2019-08-02 VITALS — HEART RATE: 75 BPM | WEIGHT: 202 LBS | HEIGHT: 68 IN | BODY MASS INDEX: 30.62 KG/M2

## 2019-08-02 DIAGNOSIS — I74.9 ARTERIAL OCCLUSION DUE TO THROMBOEMBOLISM: ICD-10-CM

## 2019-08-02 DIAGNOSIS — I21.3 ST ELEVATION MYOCARDIAL INFARCTION (STEMI), UNSPECIFIED ARTERY: ICD-10-CM

## 2019-08-02 DIAGNOSIS — I10 ESSENTIAL HYPERTENSION: ICD-10-CM

## 2019-08-02 DIAGNOSIS — E11.65 TYPE 2 DIABETES MELLITUS WITH HYPERGLYCEMIA, UNSPECIFIED WHETHER LONG TERM INSULIN USE: ICD-10-CM

## 2019-08-02 DIAGNOSIS — I25.10 CORONARY ARTERY DISEASE INVOLVING NATIVE CORONARY ARTERY OF NATIVE HEART WITHOUT ANGINA PECTORIS: ICD-10-CM

## 2019-08-02 DIAGNOSIS — E11.65 TYPE 2 DIABETES MELLITUS WITH HYPERGLYCEMIA, WITHOUT LONG-TERM CURRENT USE OF INSULIN: ICD-10-CM

## 2019-08-02 LAB
AORTIC ROOT ANNULUS: 3.47 CM
AORTIC VALVE CUSP SEPERATION: 2.63 CM
ASCENDING AORTA: 3.06 CM
AV INDEX (PROSTH): 0.99
AV MEAN GRADIENT: 3 MMHG
AV PEAK GRADIENT: 5 MMHG
AV VALVE AREA: 4.82 CM2
AV VELOCITY RATIO: 1
BSA FOR ECHO PROCEDURE: 2.1 M2
CV ECHO LV RWT: 0.45 CM
CV STRESS BASE HR: 72 BPM
DIASTOLIC BLOOD PRESSURE: 90 MMHG
DOP CALC AO PEAK VEL: 1.12 M/S
DOP CALC AO VTI: 21.1 CM
DOP CALC LVOT AREA: 4.9 CM2
DOP CALC LVOT DIAMETER: 2.49 CM
DOP CALC LVOT PEAK VEL: 1.12 M/S
DOP CALC LVOT STROKE VOLUME: 101.62 CM3
DOP CALCLVOT PEAK VEL VTI: 20.88 CM
E WAVE DECELERATION TIME: 229.48 MSEC
E/A RATIO: 0.94
E/E' RATIO: 7.69 M/S
ECHO LV POSTERIOR WALL: 1.16 CM (ref 0.6–1.1)
FRACTIONAL SHORTENING: 29 % (ref 28–44)
INTERVENTRICULAR SEPTUM: 1.18 CM (ref 0.6–1.1)
IVRT: 0.13 MSEC
LA MAJOR: 5.14 CM
LA MINOR: 5.03 CM
LA WIDTH: 4.09 CM
LEFT ABI: 1.16
LEFT ARM BP: 126 MMHG
LEFT ATRIUM SIZE: 3.61 CM
LEFT ATRIUM VOLUME INDEX: 31.1 ML/M2
LEFT ATRIUM VOLUME: 63.81 CM3
LEFT DORSALIS PEDIS: 160 MMHG
LEFT INTERNAL DIMENSION IN SYSTOLE: 3.62 CM (ref 2.1–4)
LEFT POSTERIOR TIBIAL: 150 MMHG
LEFT TBI: 0.95
LEFT TOE PRESSURE: 131 MMHG
LEFT VENTRICLE DIASTOLIC VOLUME INDEX: 60.83 ML/M2
LEFT VENTRICLE DIASTOLIC VOLUME: 124.85 ML
LEFT VENTRICLE MASS INDEX: 114 G/M2
LEFT VENTRICLE SYSTOLIC VOLUME INDEX: 26.9 ML/M2
LEFT VENTRICLE SYSTOLIC VOLUME: 55.26 ML
LEFT VENTRICULAR INTERNAL DIMENSION IN DIASTOLE: 5.12 CM (ref 3.5–6)
LEFT VENTRICULAR MASS: 234.34 G
LV LATERAL E/E' RATIO: 7.14 M/S
LV SEPTAL E/E' RATIO: 8.33 M/S
MV PEAK A VEL: 0.53 M/S
MV PEAK E VEL: 0.5 M/S
NUC STRESS DIASTOLIC VOLUME INDEX: 133
NUC STRESS EJECTION FRACTION: 38 %
NUC STRESS SYSTOLIC VOLUME INDEX: 82
OHS CV CPX 1 MINUTE RECOVERY HEART RATE: 134 BPM
OHS CV CPX 85 PERCENT MAX PREDICTED HEART RATE MALE: 149
OHS CV CPX ESTIMATED METS: 13
OHS CV CPX MAX PREDICTED HEART RATE: 175
OHS CV CPX PATIENT IS FEMALE: 0
OHS CV CPX PATIENT IS MALE: 1
OHS CV CPX PEAK DIASTOLIC BLOOD PRESSURE: 56 MMHG
OHS CV CPX PEAK HEAR RATE: 148 BPM
OHS CV CPX PEAK RATE PRESSURE PRODUCT: NORMAL
OHS CV CPX PEAK SYSTOLIC BLOOD PRESSURE: 174 MMHG
OHS CV CPX PERCENT MAX PREDICTED HEART RATE ACHIEVED: 85
OHS CV CPX RATE PRESSURE PRODUCT PRESENTING: NORMAL
PISA TR MAX VEL: 1.63 M/S
PULM VEIN S/D RATIO: 1.06
PV PEAK D VEL: 0.31 M/S
PV PEAK S VEL: 0.33 M/S
PV PEAK VELOCITY: 0.99 CM/S
RA MAJOR: 4.78 CM
RA WIDTH: 3.03 CM
RIGHT ABI: 1.18
RIGHT ARM BP: 138 MMHG
RIGHT DORSALIS PEDIS: 163 MMHG
RIGHT POSTERIOR TIBIAL: 154 MMHG
RIGHT TBI: 0.95
RIGHT TOE PRESSURE: 131 MMHG
RIGHT VENTRICULAR END-DIASTOLIC DIMENSION: 2.87 CM
RV TISSUE DOPPLER FREE WALL SYSTOLIC VELOCITY 1 (APICAL 4 CHAMBER VIEW): 12.43 CM/S
SINUS: 3.24 CM
STJ: 2.76 CM
STRESS ECHO POST EXERCISE DUR MIN: 11 MINUTES
STRESS ECHO POST EXERCISE DUR SEC: 43 SECONDS
STRESS ECHO TARGET HR: 148.75 BPM
SYSTOLIC BLOOD PRESSURE: 146 MMHG
TDI LATERAL: 0.07 M/S
TDI SEPTAL: 0.06 M/S
TDI: 0.07 M/S
TR MAX PG: 11 MMHG
TRICUSPID ANNULAR PLANE SYSTOLIC EXCURSION: 2.38 CM

## 2019-08-02 PROCEDURE — 93306 TTE W/DOPPLER COMPLETE: CPT | Mod: 26,,, | Performed by: INTERNAL MEDICINE

## 2019-08-02 PROCEDURE — 93016 STRESS TEST WITH MYOCARDIAL PERFUSION (CUPID ONLY): ICD-10-PCS | Mod: ,,, | Performed by: INTERNAL MEDICINE

## 2019-08-02 PROCEDURE — 93016 CV STRESS TEST SUPVJ ONLY: CPT | Mod: ,,, | Performed by: INTERNAL MEDICINE

## 2019-08-02 PROCEDURE — 93922 UPR/L XTREMITY ART 2 LEVELS: CPT | Mod: 26,,, | Performed by: INTERNAL MEDICINE

## 2019-08-02 PROCEDURE — 93922 UPR/L XTREMITY ART 2 LEVELS: CPT | Mod: 50

## 2019-08-02 PROCEDURE — 78452 STRESS TEST WITH MYOCARDIAL PERFUSION (CUPID ONLY): ICD-10-PCS | Mod: 26,,, | Performed by: INTERNAL MEDICINE

## 2019-08-02 PROCEDURE — 78452 HT MUSCLE IMAGE SPECT MULT: CPT | Mod: 26,,, | Performed by: INTERNAL MEDICINE

## 2019-08-02 PROCEDURE — 93306 TRANSTHORACIC ECHO (TTE) COMPLETE (CUPID ONLY): ICD-10-PCS | Mod: 26,,, | Performed by: INTERNAL MEDICINE

## 2019-08-02 PROCEDURE — A9502 TC99M TETROFOSMIN: HCPCS

## 2019-08-02 PROCEDURE — 93018 STRESS TEST WITH MYOCARDIAL PERFUSION (CUPID ONLY): ICD-10-PCS | Mod: ,,, | Performed by: INTERNAL MEDICINE

## 2019-08-02 PROCEDURE — 93306 TTE W/DOPPLER COMPLETE: CPT

## 2019-08-02 PROCEDURE — 93922 CV US ANKLE BRACHIAL INDICES RESTING (CUPID ONLY): ICD-10-PCS | Mod: 26,,, | Performed by: INTERNAL MEDICINE

## 2019-08-02 PROCEDURE — 93018 CV STRESS TEST I&R ONLY: CPT | Mod: ,,, | Performed by: INTERNAL MEDICINE

## 2019-08-02 PROCEDURE — 93017 CV STRESS TEST TRACING ONLY: CPT

## 2019-11-08 RX ORDER — PEN NEEDLE, DIABETIC 30 GX3/16"
NEEDLE, DISPOSABLE MISCELLANEOUS
Qty: 120 EACH | Refills: 2 | Status: SHIPPED | OUTPATIENT
Start: 2019-11-08

## 2020-02-11 ENCOUNTER — OFFICE VISIT (OUTPATIENT)
Dept: CARDIOLOGY | Facility: CLINIC | Age: 45
End: 2020-02-11
Payer: COMMERCIAL

## 2020-02-11 VITALS
HEIGHT: 68 IN | OXYGEN SATURATION: 98 % | WEIGHT: 205 LBS | HEART RATE: 93 BPM | BODY MASS INDEX: 31.07 KG/M2 | SYSTOLIC BLOOD PRESSURE: 144 MMHG | DIASTOLIC BLOOD PRESSURE: 73 MMHG

## 2020-02-11 DIAGNOSIS — I25.10 CORONARY ARTERY DISEASE INVOLVING NATIVE CORONARY ARTERY OF NATIVE HEART WITHOUT ANGINA PECTORIS: Primary | ICD-10-CM

## 2020-02-11 DIAGNOSIS — E11.65 TYPE 2 DIABETES MELLITUS WITH HYPERGLYCEMIA, WITHOUT LONG-TERM CURRENT USE OF INSULIN: ICD-10-CM

## 2020-02-11 DIAGNOSIS — I74.9 ARTERIAL OCCLUSION DUE TO THROMBOEMBOLISM: ICD-10-CM

## 2020-02-11 DIAGNOSIS — I10 ESSENTIAL HYPERTENSION: ICD-10-CM

## 2020-02-11 PROCEDURE — 3008F PR BODY MASS INDEX (BMI) DOCUMENTED: ICD-10-PCS | Mod: CPTII,S$GLB,, | Performed by: INTERNAL MEDICINE

## 2020-02-11 PROCEDURE — 3008F BODY MASS INDEX DOCD: CPT | Mod: CPTII,S$GLB,, | Performed by: INTERNAL MEDICINE

## 2020-02-11 PROCEDURE — 3078F DIAST BP <80 MM HG: CPT | Mod: CPTII,S$GLB,, | Performed by: INTERNAL MEDICINE

## 2020-02-11 PROCEDURE — 3077F PR MOST RECENT SYSTOLIC BLOOD PRESSURE >= 140 MM HG: ICD-10-PCS | Mod: CPTII,S$GLB,, | Performed by: INTERNAL MEDICINE

## 2020-02-11 PROCEDURE — 99214 OFFICE O/P EST MOD 30 MIN: CPT | Mod: S$GLB,,, | Performed by: INTERNAL MEDICINE

## 2020-02-11 PROCEDURE — 3078F PR MOST RECENT DIASTOLIC BLOOD PRESSURE < 80 MM HG: ICD-10-PCS | Mod: CPTII,S$GLB,, | Performed by: INTERNAL MEDICINE

## 2020-02-11 PROCEDURE — 3077F SYST BP >= 140 MM HG: CPT | Mod: CPTII,S$GLB,, | Performed by: INTERNAL MEDICINE

## 2020-02-11 PROCEDURE — 99214 PR OFFICE/OUTPT VISIT, EST, LEVL IV, 30-39 MIN: ICD-10-PCS | Mod: S$GLB,,, | Performed by: INTERNAL MEDICINE

## 2020-02-11 PROCEDURE — 99999 PR PBB SHADOW E&M-EST. PATIENT-LVL IV: ICD-10-PCS | Mod: PBBFAC,,, | Performed by: INTERNAL MEDICINE

## 2020-02-11 PROCEDURE — 99999 PR PBB SHADOW E&M-EST. PATIENT-LVL IV: CPT | Mod: PBBFAC,,, | Performed by: INTERNAL MEDICINE

## 2020-02-11 NOTE — PROGRESS NOTES
Subjective:    Patient ID:  Kathy Vinson is a 44 y.o. male who presents for follow-up of Coronary Artery Disease      HPI     CAD - NSTEMI RYLIE to LAD and RCA -  Cx  6/22/18, HTN, DM, HLD, LE arterial embolism - s/p 6 months of eliquis        Referred by Dr Patten  Mr Vinson is a 43 yo man with hypertension, DLPD, who was recently admitted for NSTEMI and s/p LAD PCI with 3.5  X 18 mm RYLIE and mid RCA PCI with 3.0 x 12 mm RYLIE and LCX/Om  who comes for clinical follow up.   Patient was supposed to have a stress test done today to evaluate underlying ischemia of the LCX/Om territory. He also had vascular US given luminal irregularities seen on angiogram.   Today he reports to be doing well and denies anginal symptoms.      Coronary artery disease involving native coronary artery of native heart  -Patient is totally asymptomatic at the moment. He is able to perform all his daily activities without any angina, palpitations or diaphoresis.   -Would continue with medical therapy for CAD and repeat 2D echo with CFD  -PET stress showed ischemia of the LCX/OM territory therefore we will do another angiogram with plan PCI  -LHC and coronary angiogram via R CFA  -IVF at 3 ml/hour  -Patient is a RYLIE candidate  -The risks, benefits & alternatives of the procedure were explained to the patient.    -The risks of coronary angiography include but are not limited to:  Bleeding, infection, heart rhythm abnormalities, allergic reactions, kidney injury, stroke and death.    -Should stenting be indicated, the patient has agreed to dual anti-platelet therapy for 1-consecutive year with a drug-eluting stent and a minimum of 1-month with the use of a bare metal stent.    -The risks of moderate sedation include hypotension, respiratory depression, arrhythmias, bronchospasm, & death.    -Informed consent was obtained & the patient is agreeable to proceed with the procedure.  -This patient was discussed with the attending interventional  cardiologist who agrees with the above assessment & plan.       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 There was an unsuccessful attempt at Cx .  Patent RCA and LAD stents.  Patient tolerated well the procedure and there were no complications.   He remained stable and was safely discharged home.     Stress test 8/2/19    Abnormal Damari myocardial perfusion study.    There is a moderate sized, moderate intensity, predominantly fixed with some reversibilty defect in the basal to mid inferolateral wall(s).    Gated perfusion images showed an ejection fraction of  38 % post stress.    There is  inferolateral wall(s) moderate hypokinesis post stress.     Echo 8/2/19  · Normal left ventricular systolic function. The estimated ejection fraction is 60%  · Concentric left ventricular remodeling.  · No wall motion abnormalities.  · Normal right ventricular systolic function.     FRANCISCO 8/2/19  Normal FRANCISCO bilaterally.  Normal PVR waveforms bilaterally.     12/3/18 Has been on eliquis for 6 months  Denies CP or SOB  Did not do cardiac rehab  EKG NSR PRWP  Stop eliquis since he has been treated for 6 months due to arterial embolus  Cardiac stable  Declines cardiac rehab     3/14/19 Gets occasional CP improved with NTG  C/O ED  EKG NSR PRWP - similar to previous   Rx for viagra - instructed not to take with NTG  OV 6 months with echo and exercise myoview     7/22/19 EKG NSR PRWP  Denies significant CP or SOB  Reports some numbness and tingling on occasion in his RLE     2/11/20 Denies significant CP or SOB  Going to the gym regularly    Review of Systems   Constitution:  Negative for decreased appetite.   HENT: Negative for ear discharge.    Eyes: Negative for blurred vision.   Endocrine: Negative for polyphagia.   Skin: Negative for nail changes.   Genitourinary: Negative for bladder incontinence.   Neurological: Negative for aphonia.   Psychiatric/Behavioral: Negative for hallucinations.   Allergic/Immunologic: Negative for hives.        Objective:    Physical Exam   Constitutional: He is oriented to person, place, and time. He appears well-developed and well-nourished.   HENT:   Head: Normocephalic and atraumatic.   Eyes: Pupils are equal, round, and reactive to light. Conjunctivae are normal.   Neck: Normal range of motion. Neck supple.   Cardiovascular: Normal rate, normal heart sounds and intact distal pulses.   Pulmonary/Chest: Effort normal and breath sounds normal.   Abdominal: Soft. Bowel sounds are normal.   Musculoskeletal: Normal range of motion.   Neurological: He is alert and oriented to person, place, and time.   Skin: Skin is warm and dry.         Assessment:       1. Coronary artery disease involving native coronary artery of native heart without angina pectoris    2. Essential hypertension    3. 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 Type 2 diabetes mellitus with hyperglycemia, without long-term current use of insulin         Plan:       Cardiac stable  OV 6 months

## 2020-03-20 NOTE — CONSULTS
----- Message from Kervin Caceres sent at 3/20/2020  4:15 PM CDT -----  Type: Needs Medical Advice    Who Called:  Patient    Best Call Back Number: [phone]  Additional Information: Patient states that she would like a callback regarding questions about her medical boot     [facility]  Interventional Cardiology  Consult Note    Patient Name: Kathy Vinson  MRN: [id number]  Admission Date: 6/17/2018  Hospital Length of Stay: 5 days  Code Status: Full Code   Attending Provider: Paul Saravia MD  Consulting Provider: Ching Adorno MD  Primary Care Physician: Melida Martinez MD  Principal Problem:STEMI (ST elevation myocardial infarction)    Patient information was obtained from patient and past medical records.     Inpatient consult to Interventional Cardiology  Consult performed by: CHING ADORNO  Consult ordered by: BARNEY BARNETT  Reason for consult: CAD        Subjective:     Chief Complaint:  Chest pain     HPI:  42-year-old gentleman with h/o HTN, DM-2 presented with mild to moderate midsternal chest discomfort that radiates to the left arm.  It occurs intermittently ×1 week, usually after meals lasting 1-2 hours.  He initially noted it when he was eating. Subsequently chest discomfort at rest while sleeping and continuing into today morning. Same day at [facility] the CP continued and concerned him enough to come into the emergency department.  Denies nausea, vomiting, shortness of breath, lightheadedness.     Cardiac cath today   Left Main Coronary Artery:             The LM has luminal irregularities. There is RAF 3 flow.     - Left Anterior Descending Artery:             The proximal LAD is occluded (100). There is RAF 0 flow. Collateral filling from right coronary artery.     - Left Circumflex Artery:             The LCX has a 90% stenosis. There is RAF 3 flow.     - Right Coronary Artery:             The RCA has a 90% stenosis. There is RAF 3 flow.     - Common Femoral Artery:             The right CFA is normal.     - Superficial Femoral Artery:             The right proximal SFA has a 70% stenosis.                     Lesion Details:   There is severe thrombus. Saddle thrombus.     - Profunda Femoral Artery:             The right proximal PFA  has a 70% stenosis.                     Lesion Details:   There is severe thrombus. Saddle Thrombus     Bedside echo done  using contrast: EF 30-35%, RV normal function. Mid-anteroseptum akinetic, mid to distal inferospetum akinetic, apex akinetic and distal anterolateral wall is akinetic, LV apex appears aneurysmal.  No thrombus noted.    CTS consulted -recommned PCI of LCX and RCA and viability eval for LAD.    Past Medical History:   Diagnosis Date    Diabetes mellitus     Hypertension        Past Surgical History:   Procedure Laterality Date    HERNIA REPAIR         Review of patient's allergies indicates:  No Known Allergies    PTA Medications   Medication Sig    aspirin (ECOTRIN) 81 MG EC tablet Take 81 mg by mouth once daily.    insulin aspart protamine-insulin aspart (NOVOLOG 70/30) 100 unit/mL (70-30) InPn pen Inject into the skin 2 (two) times daily before meals.    insulin glargine (LANTUS SOLOSTAR) 100 unit/mL (3 mL) InPn pen Inject into the skin.    lisinopril (PRINIVIL,ZESTRIL) 2.5 MG tablet Take 2.5 mg by mouth once daily.    metFORMIN (GLUCOPHAGE) 500 MG tablet Take 1,000 mg by mouth 2 (two) times daily with meals.      Family History     None        Social History Main Topics    Smoking status: Never Smoker    Smokeless tobacco: Current User     Types: Snuff    Alcohol use No      Comment: occasion    Drug use: No    Sexual activity: Yes     Review of Systems   All other systems reviewed and are negative.    Objective:     Vital Signs (Most Recent):  Temp: 98.8 °F (37.1 °C) (06/22/18 0315)  Pulse: 80 (06/22/18 0630)  Resp: (!) 27 (06/22/18 0630)  BP: 119/76 (06/22/18 0600)  SpO2: 96 % (06/22/18 0630) Vital Signs (24h Range):  Temp:  [98 °F (36.7 °C)-98.9 °F (37.2 °C)] 98.8 °F (37.1 °C)  Pulse:  [] 80  Resp:  [18-42] 27  SpO2:  [90 %-100 %] 96 %  BP: ()/(58-78) 119/76     Weight: 89.4 kg (197 lb 1.5 oz)  Body mass index is 29.97 kg/m².    SpO2: 96 %  O2 Device (Oxygen  Therapy): room air      Intake/Output Summary (Last 24 hours) at 06/22/18 0702  Last data filed at 06/22/18 0621   Gross per 24 hour   Intake           839.71 ml   Output             2400 ml   Net         -1560.29 ml       Lines/Drains/Airways     Peripheral Intravenous Line                 Peripheral IV - Single Lumen 06/17/18 1322 Left Antecubital 4 days         Peripheral IV - Single Lumen 06/17/18 1323 Right Antecubital 4 days         Peripheral IV - Single Lumen 06/22/18 0643 Left Wrist less than 1 day                Physical Exam  General: alert, awake and oriented x 3  Eyes:PERRL.   Neck:no JVD   Lungs:  clear to auscultation bilaterally   Cardiovascular: Heart: regular rate and rhythm, S1, S2 normal, no murmur, click, rub or gallop.   Chest Wall: no tenderness.   Pulses-2+ radial, femoral, DP, PT b/l  Extremities: no cyanosis or edema.   Abdomen/Rectal: Abdomen: soft, non-tender non-distented; bowel sounds normal  Neurologic: Normal strength and tone. No focal numbness or weakness  Significant Labs:   CMP   Recent Labs  Lab 06/21/18  0532   *   K 3.9      CO2 25   *   BUN 7   CREATININE 0.8   CALCIUM 9.5   ANIONGAP 9   ESTGFRAFRICA >60.0   EGFRNONAA >60.0    and CBC   Recent Labs  Lab 06/21/18  0532 06/22/18  0453   WBC 6.60 5.94   HGB 11.0* 10.3*   HCT 33.3* 31.9*    374*       Significant Imaging: Echocardiogram:   2D echo with color flow doppler:   Results for orders placed or performed during the hospital encounter of 06/17/18   2D echo with color flow doppler   Result Value Ref Range    EF 28 (A) 55 - 65    Mitral Valve Regurgitation MODERATE (A)      Assessment and Plan:     * STEMI (ST elevation myocardial infarction)      - Keep NPO   -RYLIE candidate  -RCA and Lcx revascularization  -Severe thrombus right SFA and PFA  -The risks, benefits and alternatives of the procedure were explained to the patient.   -The risks of coronary angiography include but are not limited to:  bleeding, infection, heart rhythm abnormalities, allergic reactions, kidney injury and potential need for dialysis, stroke and death.   -The risks of moderate sedation include hypotension, respiratory depression, arrhythmias, bronchospasm, and death.   - Informed consent was obtained and the  patient is agreeable to proceed with the procedure.            VTE Risk Mitigation         Ordered     heparin 25,000 units in dextrose 5% 250 mL (100 units/mL) bolus from bag; ADDITIONAL PRN BOLUS  As needed (PRN)      06/17/18 2225     heparin 25,000 units in dextrose 5% 250 mL (100 units/mL) bolus from bag; ADDITIONAL PRN BOLUS  As needed (PRN)      06/17/18 2205     IP VTE LOW RISK PATIENT  Once      06/17/18 2202     heparin 25,000 units in dextrose 5% 250 mL (100 units/mL) infusion (heparin infusion)  Continuous      06/17/18 1924          Thank you for your consult.     Andres Adorno MD  Interventional Cardiology   [facility]

## 2020-08-27 ENCOUNTER — OFFICE VISIT (OUTPATIENT)
Dept: CARDIOLOGY | Facility: CLINIC | Age: 45
End: 2020-08-27
Payer: COMMERCIAL

## 2020-08-27 VITALS
WEIGHT: 198.44 LBS | DIASTOLIC BLOOD PRESSURE: 76 MMHG | SYSTOLIC BLOOD PRESSURE: 124 MMHG | RESPIRATION RATE: 16 BRPM | HEART RATE: 79 BPM | BODY MASS INDEX: 30.07 KG/M2 | HEIGHT: 68 IN | OXYGEN SATURATION: 99 %

## 2020-08-27 DIAGNOSIS — I25.10 CORONARY ARTERY DISEASE INVOLVING NATIVE CORONARY ARTERY OF NATIVE HEART WITHOUT ANGINA PECTORIS: Primary | ICD-10-CM

## 2020-08-27 DIAGNOSIS — E11.65 TYPE 2 DIABETES MELLITUS WITH HYPERGLYCEMIA, WITHOUT LONG-TERM CURRENT USE OF INSULIN: ICD-10-CM

## 2020-08-27 DIAGNOSIS — I10 ESSENTIAL HYPERTENSION: ICD-10-CM

## 2020-08-27 DIAGNOSIS — I25.10 CORONARY ARTERY DISEASE: ICD-10-CM

## 2020-08-27 PROCEDURE — 99214 PR OFFICE/OUTPT VISIT, EST, LEVL IV, 30-39 MIN: ICD-10-PCS | Mod: S$GLB,,, | Performed by: INTERNAL MEDICINE

## 2020-08-27 PROCEDURE — 99214 OFFICE O/P EST MOD 30 MIN: CPT | Mod: S$GLB,,, | Performed by: INTERNAL MEDICINE

## 2020-08-27 PROCEDURE — 3074F SYST BP LT 130 MM HG: CPT | Mod: CPTII,S$GLB,, | Performed by: INTERNAL MEDICINE

## 2020-08-27 PROCEDURE — 3008F BODY MASS INDEX DOCD: CPT | Mod: CPTII,S$GLB,, | Performed by: INTERNAL MEDICINE

## 2020-08-27 PROCEDURE — 3078F DIAST BP <80 MM HG: CPT | Mod: CPTII,S$GLB,, | Performed by: INTERNAL MEDICINE

## 2020-08-27 PROCEDURE — 93000 ELECTROCARDIOGRAM COMPLETE: CPT | Mod: S$GLB,,, | Performed by: INTERNAL MEDICINE

## 2020-08-27 PROCEDURE — 3078F PR MOST RECENT DIASTOLIC BLOOD PRESSURE < 80 MM HG: ICD-10-PCS | Mod: CPTII,S$GLB,, | Performed by: INTERNAL MEDICINE

## 2020-08-27 PROCEDURE — 99999 PR PBB SHADOW E&M-EST. PATIENT-LVL IV: ICD-10-PCS | Mod: PBBFAC,,, | Performed by: INTERNAL MEDICINE

## 2020-08-27 PROCEDURE — 3074F PR MOST RECENT SYSTOLIC BLOOD PRESSURE < 130 MM HG: ICD-10-PCS | Mod: CPTII,S$GLB,, | Performed by: INTERNAL MEDICINE

## 2020-08-27 PROCEDURE — 93000 EKG 12-LEAD: ICD-10-PCS | Mod: S$GLB,,, | Performed by: INTERNAL MEDICINE

## 2020-08-27 PROCEDURE — 99999 PR PBB SHADOW E&M-EST. PATIENT-LVL IV: CPT | Mod: PBBFAC,,, | Performed by: INTERNAL MEDICINE

## 2020-08-27 PROCEDURE — 3008F PR BODY MASS INDEX (BMI) DOCUMENTED: ICD-10-PCS | Mod: CPTII,S$GLB,, | Performed by: INTERNAL MEDICINE

## 2020-08-27 RX ORDER — SILDENAFIL CITRATE 20 MG/1
40 TABLET ORAL DAILY PRN
Qty: 30 TABLET | Refills: 11 | Status: SHIPPED | OUTPATIENT
Start: 2020-08-27 | End: 2021-09-09

## 2020-08-27 NOTE — PROGRESS NOTES
Subjective:    Patient ID:  Kathy Vinson is a 44 y.o. male who presents for follow-up of Coronary Artery Disease      HPI     CAD - NSTEMI RYLIE to LAD and RCA -  Cx  6/22/18, HTN, DM, HLD, LE arterial embolism - s/p 6 months of eliquis        Referred by Dr Patten  Mr Vinson is a 41 yo man with hypertension, DLPD, who was recently admitted for NSTEMI and s/p LAD PCI with 3.5  X 18 mm RYLIE and mid RCA PCI with 3.0 x 12 mm RYLIE and LCX/Om  who comes for clinical follow up.   Patient was supposed to have a stress test done today to evaluate underlying ischemia of the LCX/Om territory. He also had vascular US given luminal irregularities seen on angiogram.   Today he reports to be doing well and denies anginal symptoms.      Coronary artery disease involving native coronary artery of native heart  -Patient is totally asymptomatic at the moment. He is able to perform all his daily activities without any angina, palpitations or diaphoresis.   -Would continue with medical therapy for CAD and repeat 2D echo with CFD  -PET stress showed ischemia of the LCX/OM territory therefore we will do another angiogram with plan PCI  -LHC and coronary angiogram via R CFA  -IVF at 3 ml/hour  -Patient is a RYLIE candidate  -The risks, benefits & alternatives of the procedure were explained to the patient.    -The risks of coronary angiography include but are not limited to:  Bleeding, infection, heart rhythm abnormalities, allergic reactions, kidney injury, stroke and death.    -Should stenting be indicated, the patient has agreed to dual anti-platelet therapy for 1-consecutive year with a drug-eluting stent and a minimum of 1-month with the use of a bare metal stent.    -The risks of moderate sedation include hypotension, respiratory depression, arrhythmias, bronchospasm, & death.    -Informed consent was obtained & the patient is agreeable to proceed with the procedure.  -This patient was discussed with the attending interventional  cardiologist who agrees with the above assessment & plan.       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 There was an unsuccessful attempt at Cx .  Patent RCA and LAD stents.  Patient tolerated well the procedure and there were no complications.   He remained stable and was safely discharged home.      Stress test 8/2/19    Abnormal Damari myocardial perfusion study.    There is a moderate sized, moderate intensity, predominantly fixed with some reversibilty defect in the basal to mid inferolateral wall(s).    Gated perfusion images showed an ejection fraction of  38 % post stress.    There is  inferolateral wall(s) moderate hypokinesis post stress.     Echo 8/2/19  · Normal left ventricular systolic function. The estimated ejection fraction is 60%  · Concentric left ventricular remodeling.  · No wall motion abnormalities.  · Normal right ventricular systolic function.     FRANCISCO 8/2/19  Normal FRANCISCO bilaterally.  Normal PVR waveforms bilaterally.      12/3/18 Has been on eliquis for 6 months  Denies CP or SOB  Did not do cardiac rehab  EKG NSR PRWP  Stop eliquis since he has been treated for 6 months due to arterial embolus  Cardiac stable  Declines cardiac rehab     3/14/19 Gets occasional CP improved with NTG  C/O ED  EKG NSR PRWP - similar to previous   Rx for viagra - instructed not to take with NTG  OV 6 months with echo and exercise myoview     7/22/19 EKG NSR PRWP  Denies significant CP or SOB  Reports some numbness and tingling on occasion in his RLE     2/11/20 Denies significant CP or SOB  Going to the gym regularly     8/27/20 Denies CP or SOB  Goes to  the gym regularly  EKG NSR PRWP      Review of Systems   Constitution: Negative for decreased appetite.   HENT: Negative for ear discharge.    Eyes: Negative for blurred vision.   Endocrine: Negative for polyphagia.   Skin: Negative for nail changes.   Genitourinary: Negative for bladder incontinence.   Neurological: Negative for aphonia.   Psychiatric/Behavioral: Negative for hallucinations.   Allergic/Immunologic: Negative for hives.        Objective:    Physical Exam   Constitutional: He is oriented to person, place, and time. He appears well-developed and well-nourished.   HENT:   Head: Normocephalic and atraumatic.   Eyes: Pupils are equal, round, and reactive to light. Conjunctivae are normal.   Neck: Normal range of motion. Neck supple.   Cardiovascular: Normal rate, normal heart sounds and intact distal pulses.   Pulmonary/Chest: Effort normal and breath sounds normal.   Abdominal: Soft. Bowel sounds are normal.   Musculoskeletal: Normal range of motion.   Neurological: He is alert and oriented to person, place, and time.   Skin: Skin is warm and dry.         Assessment:       1. Coronary artery disease involving native coronary artery of native heart without angina pectoris    2. Essential hypertension    3. Type 2 diabetes mellitus with hyperglycemia, without long-term current use of insulin         Plan:       BP controlled on current Rx  Denies angina  Requesting refill of ED medication  OV 6 months with echo

## 2021-09-09 ENCOUNTER — OFFICE VISIT (OUTPATIENT)
Dept: CARDIOLOGY | Facility: CLINIC | Age: 46
End: 2021-09-09
Payer: COMMERCIAL

## 2021-09-09 VITALS
WEIGHT: 192 LBS | BODY MASS INDEX: 29.1 KG/M2 | HEART RATE: 79 BPM | OXYGEN SATURATION: 98 % | HEIGHT: 68 IN | SYSTOLIC BLOOD PRESSURE: 120 MMHG | DIASTOLIC BLOOD PRESSURE: 66 MMHG

## 2021-09-09 DIAGNOSIS — R07.89 CHEST PAIN, ATYPICAL: ICD-10-CM

## 2021-09-09 DIAGNOSIS — I10 ESSENTIAL HYPERTENSION: ICD-10-CM

## 2021-09-09 DIAGNOSIS — E11.65 TYPE 2 DIABETES MELLITUS WITH HYPERGLYCEMIA, WITHOUT LONG-TERM CURRENT USE OF INSULIN: ICD-10-CM

## 2021-09-09 DIAGNOSIS — I25.10 CORONARY ARTERY DISEASE INVOLVING NATIVE CORONARY ARTERY OF NATIVE HEART WITHOUT ANGINA PECTORIS: Primary | ICD-10-CM

## 2021-09-09 DIAGNOSIS — I74.9 ARTERIAL OCCLUSION DUE TO THROMBOEMBOLISM: ICD-10-CM

## 2021-09-09 DIAGNOSIS — E78.00 PURE HYPERCHOLESTEROLEMIA: ICD-10-CM

## 2021-09-09 PROCEDURE — 3074F SYST BP LT 130 MM HG: CPT | Mod: CPTII,S$GLB,, | Performed by: INTERNAL MEDICINE

## 2021-09-09 PROCEDURE — 99999 PR PBB SHADOW E&M-EST. PATIENT-LVL IV: CPT | Mod: PBBFAC,,, | Performed by: INTERNAL MEDICINE

## 2021-09-09 PROCEDURE — 93000 ELECTROCARDIOGRAM COMPLETE: CPT | Mod: S$GLB,,, | Performed by: INTERNAL MEDICINE

## 2021-09-09 PROCEDURE — 3008F PR BODY MASS INDEX (BMI) DOCUMENTED: ICD-10-PCS | Mod: CPTII,S$GLB,, | Performed by: INTERNAL MEDICINE

## 2021-09-09 PROCEDURE — 1160F RVW MEDS BY RX/DR IN RCRD: CPT | Mod: CPTII,S$GLB,, | Performed by: INTERNAL MEDICINE

## 2021-09-09 PROCEDURE — 3074F PR MOST RECENT SYSTOLIC BLOOD PRESSURE < 130 MM HG: ICD-10-PCS | Mod: CPTII,S$GLB,, | Performed by: INTERNAL MEDICINE

## 2021-09-09 PROCEDURE — 3078F DIAST BP <80 MM HG: CPT | Mod: CPTII,S$GLB,, | Performed by: INTERNAL MEDICINE

## 2021-09-09 PROCEDURE — 1159F PR MEDICATION LIST DOCUMENTED IN MEDICAL RECORD: ICD-10-PCS | Mod: CPTII,S$GLB,, | Performed by: INTERNAL MEDICINE

## 2021-09-09 PROCEDURE — 99214 OFFICE O/P EST MOD 30 MIN: CPT | Mod: S$GLB,,, | Performed by: INTERNAL MEDICINE

## 2021-09-09 PROCEDURE — 1160F PR REVIEW ALL MEDS BY PRESCRIBER/CLIN PHARMACIST DOCUMENTED: ICD-10-PCS | Mod: CPTII,S$GLB,, | Performed by: INTERNAL MEDICINE

## 2021-09-09 PROCEDURE — 99999 PR PBB SHADOW E&M-EST. PATIENT-LVL IV: ICD-10-PCS | Mod: PBBFAC,,, | Performed by: INTERNAL MEDICINE

## 2021-09-09 PROCEDURE — 99214 PR OFFICE/OUTPT VISIT, EST, LEVL IV, 30-39 MIN: ICD-10-PCS | Mod: S$GLB,,, | Performed by: INTERNAL MEDICINE

## 2021-09-09 PROCEDURE — 3078F PR MOST RECENT DIASTOLIC BLOOD PRESSURE < 80 MM HG: ICD-10-PCS | Mod: CPTII,S$GLB,, | Performed by: INTERNAL MEDICINE

## 2021-09-09 PROCEDURE — 3008F BODY MASS INDEX DOCD: CPT | Mod: CPTII,S$GLB,, | Performed by: INTERNAL MEDICINE

## 2021-09-09 PROCEDURE — 1159F MED LIST DOCD IN RCRD: CPT | Mod: CPTII,S$GLB,, | Performed by: INTERNAL MEDICINE

## 2021-09-09 PROCEDURE — 93000 EKG 12-LEAD: ICD-10-PCS | Mod: S$GLB,,, | Performed by: INTERNAL MEDICINE

## 2021-09-09 RX ORDER — ATORVASTATIN CALCIUM 80 MG/1
80 TABLET, FILM COATED ORAL NIGHTLY
Qty: 90 TABLET | Refills: 3 | Status: SHIPPED | OUTPATIENT
Start: 2021-09-09 | End: 2022-09-09

## 2021-10-06 ENCOUNTER — HOSPITAL ENCOUNTER (OUTPATIENT)
Dept: RADIOLOGY | Facility: HOSPITAL | Age: 46
Discharge: HOME | End: 2021-10-06
Attending: INTERNAL MEDICINE
Payer: COMMERCIAL

## 2021-10-06 ENCOUNTER — HOSPITAL ENCOUNTER (OUTPATIENT)
Dept: CARDIOLOGY | Facility: HOSPITAL | Age: 46
Discharge: HOME | End: 2021-10-06
Attending: INTERNAL MEDICINE
Payer: COMMERCIAL

## 2021-10-06 DIAGNOSIS — E11.65 TYPE 2 DIABETES MELLITUS WITH HYPERGLYCEMIA, WITHOUT LONG-TERM CURRENT USE OF INSULIN: ICD-10-CM

## 2021-10-06 DIAGNOSIS — I74.9 ARTERIAL OCCLUSION DUE TO THROMBOEMBOLISM: ICD-10-CM

## 2021-10-06 DIAGNOSIS — R07.89 CHEST PAIN, ATYPICAL: ICD-10-CM

## 2021-10-06 DIAGNOSIS — I25.10 CORONARY ARTERY DISEASE INVOLVING NATIVE CORONARY ARTERY OF NATIVE HEART WITHOUT ANGINA PECTORIS: ICD-10-CM

## 2021-10-06 DIAGNOSIS — E78.00 PURE HYPERCHOLESTEROLEMIA: ICD-10-CM

## 2021-10-06 DIAGNOSIS — I10 ESSENTIAL HYPERTENSION: ICD-10-CM

## 2021-10-06 LAB
CV STRESS BASE HR: 91 BPM
DIASTOLIC BLOOD PRESSURE: 84 MMHG
NUC STRESS EJECTION FRACTION: 44 %
OHS CV CPX 85 PERCENT MAX PREDICTED HEART RATE MALE: 147
OHS CV CPX MAX PREDICTED HEART RATE: 173
OHS CV CPX PATIENT IS FEMALE: 0
OHS CV CPX PATIENT IS MALE: 1
OHS CV CPX PEAK DIASTOLIC BLOOD PRESSURE: 81 MMHG
OHS CV CPX PEAK HEAR RATE: 100 BPM
OHS CV CPX PEAK RATE PRESSURE PRODUCT: NORMAL
OHS CV CPX PEAK SYSTOLIC BLOOD PRESSURE: 127 MMHG
OHS CV CPX PERCENT MAX PREDICTED HEART RATE ACHIEVED: 58
OHS CV CPX RATE PRESSURE PRODUCT PRESENTING: NORMAL
SYSTOLIC BLOOD PRESSURE: 140 MMHG

## 2021-10-06 PROCEDURE — 93018 STRESS TEST WITH MYOCARDIAL PERFUSION (CUPID ONLY): ICD-10-PCS | Mod: ,,, | Performed by: INTERNAL MEDICINE

## 2021-10-06 PROCEDURE — 78452 HT MUSCLE IMAGE SPECT MULT: CPT

## 2021-10-06 PROCEDURE — 63600175 PHARM REV CODE 636 W HCPCS: Performed by: INTERNAL MEDICINE

## 2021-10-06 PROCEDURE — 78452 HT MUSCLE IMAGE SPECT MULT: CPT | Mod: 26,,, | Performed by: INTERNAL MEDICINE

## 2021-10-06 PROCEDURE — 78452 STRESS TEST WITH MYOCARDIAL PERFUSION (CUPID ONLY): ICD-10-PCS | Mod: 26,,, | Performed by: INTERNAL MEDICINE

## 2021-10-06 PROCEDURE — 93016 CV STRESS TEST SUPVJ ONLY: CPT | Mod: ,,, | Performed by: INTERNAL MEDICINE

## 2021-10-06 PROCEDURE — 93016 STRESS TEST WITH MYOCARDIAL PERFUSION (CUPID ONLY): ICD-10-PCS | Mod: ,,, | Performed by: INTERNAL MEDICINE

## 2021-10-06 PROCEDURE — 93017 CV STRESS TEST TRACING ONLY: CPT

## 2021-10-06 PROCEDURE — A9502 TC99M TETROFOSMIN: HCPCS

## 2021-10-06 PROCEDURE — 93018 CV STRESS TEST I&R ONLY: CPT | Mod: ,,, | Performed by: INTERNAL MEDICINE

## 2021-10-06 RX ORDER — REGADENOSON 0.08 MG/ML
0.4 INJECTION, SOLUTION INTRAVENOUS ONCE
Status: COMPLETED | OUTPATIENT
Start: 2021-10-06 | End: 2021-10-06

## 2021-10-06 RX ADMIN — REGADENOSON 0.4 MG: 0.08 INJECTION, SOLUTION INTRAVENOUS at 09:10

## 2022-02-24 NOTE — CONSULTS
[facility]  Cardiothoracic Surgery  Consult Note    Patient Name: Kathy Vinson  MRN: [id number]  Admission Date: 6/17/2018  Attending Physician: Paul Saravia MD  Referring Provider: Self, Aaareferral    Patient information was obtained from patient, past medical records and ER records.     Inpatient consult to Cardiothoracic Surgery  Consult performed by: JESSICA BRICE  Consult ordered by: LACEY BRITT        Subjective:     Principal Problem: STEMI (ST elevation myocardial infarction)    History of Present Illness: Mr. Vinson is a 43yo M with PMH of HTN, DM who presented to the ED with chest pain. STEMI with LHC showing multivessel disease. CTS consulted to evaluate for CABG    No current facility-administered medications on file prior to encounter.      No current outpatient prescriptions on file prior to encounter.       Review of patient's allergies indicates:  No Known Allergies    Past Medical History:   Diagnosis Date    Diabetes mellitus     Hypertension      Past Surgical History:   Procedure Laterality Date    HERNIA REPAIR       Family History     None        Social History Main Topics    Smoking status: Never Smoker    Smokeless tobacco: Current User     Types: Snuff    Alcohol use No      Comment: occasion    Drug use: No    Sexual activity: Yes     Review of Systems   Constitutional: Negative for appetite change, chills and fever.   HENT: Negative for trouble swallowing.    Eyes: Negative for visual disturbance.   Respiratory: Negative for chest tightness and shortness of breath.    Cardiovascular: Negative for chest pain and palpitations.   Gastrointestinal: Negative for abdominal pain.   Genitourinary: Negative for difficulty urinating.   Neurological: Negative for seizures and light-headedness.     Objective:     Vital Signs (Most Recent):  Temp: 99.2 °F (37.3 °C) (06/18/18 0700)  Pulse: 98 (06/18/18 1015)  Resp: (!) 22 (06/18/18 1015)  BP:  119/86 (06/18/18 1000)  SpO2: 96 % (06/18/18 1015) Vital Signs (24h Range):  Temp:  [98.3 °F (36.8 °C)-99.4 °F (37.4 °C)] 99.2 °F (37.3 °C)  Pulse:  [] 98  Resp:  [8-48] 22  SpO2:  [92 %-100 %] 96 %  BP: ()/(55-86) 119/86     Weight: 89.4 kg (197 lb 1.5 oz)  Body mass index is 29.97 kg/m².    SpO2: 96 %  O2 Device (Oxygen Therapy): nasal cannula     Intake/Output - Last 3 Shifts       06/16 0700 - 06/17 0659 06/17 0700 - 06/18 0659 06/18 0700 - 06/19 0659    P.O.  240     I.V. (mL/kg)  393.9 (4.4)     Total Intake(mL/kg)  633.9 (7.1)     Urine (mL/kg/hr)  300     Total Output   300      Net   +333.9             Urine Occurrence  1 x 800 x           Lines/Drains/Airways     Peripheral Intravenous Line                 Peripheral IV - Single Lumen 06/17/18 1322 Left Antecubital less than 1 day         Peripheral IV - Single Lumen 06/17/18 1323 Right Antecubital less than 1 day                 STS Risk Score:   Procedure: CAB Only    Risk of Mortality: 0.409%    Morbidity or Mortality: 8.928%    Long Length of Stay: 2.445%    Short Length of Stay: 63.299%    Permanent Stroke: 0.715%    Prolonged Ventilation: 5.6%    DSW Infection: 0.339%    Renal Failure: 0.65%    Reoperation: 3.773%    Physical Exam   Constitutional: He is oriented to person, place, and time. He appears well-developed and well-nourished.   Eyes: Conjunctivae are normal.   Cardiovascular: Normal rate and regular rhythm.    Pulmonary/Chest: Effort normal.   Abdominal: Soft.   Scar at umbilicus   Neurological: He is alert and oriented to person, place, and time.   Skin: Skin is warm and dry.       Significant Labs:  Cardiac markers:   Recent Labs  Lab 06/18/18  0853   TROPONINI 8.389*     CBC:   Recent Labs  Lab 06/18/18  0531   WBC 7.90   RBC 4.26*   HGB 12.1*   HCT 36.0*      MCV 85   MCH 28.4   MCHC 33.6     CMP:   Recent Labs  Lab 06/18/18  0853   *   CALCIUM 8.6*   ALBUMIN 2.8*   PROT 6.2   *   K 4.0   CO2 25       BUN 4*   CREATININE 0.6   ALKPHOS 64   ALT 18   AST 42*   BILITOT 0.7       Significant Diagnostics:  ECHO 6/18/18:  1 - Moderately to severely depressed left ventricular systolic function (EF upper 20s to low 30s).     2 - Mildly depressed right ventricular systolic function    C 6/17/18:     - Left Main Coronary Artery:             The LM has luminal irregularities. There is RAF 3 flow.     - Left Anterior Descending Artery:             The proximal LAD is occluded (100). There is RAF 0 flow. Collateral filling from right coronary artery.     - Left Circumflex Artery:             The LCX has a 90% stenosis. There is RAF 3 flow.     - Right Coronary Artery:             The RCA has a 90% stenosis. There is RAF 3 flow.     - Common Femoral Artery:             The right CFA is normal.     - Superficial Femoral Artery:             The right proximal SFA has a 70% stenosis.                     Lesion Details:   There is severe thrombus. Saddle thrombus.     - Profunda Femoral Artery:             The right proximal PFA has a 70% stenosis.                     Lesion Details:   There is severe thrombus. Saddle Thrombus    Assessment/Plan:     NYHA Score: NYHA I: cardiac disease, but without resulting limitations of physical activity    * STEMI (ST elevation myocardial infarction)    43yo M with STEMI and multivessel disease    - Dr. Kolb to review angio, please await addendum for plan  - Hgb A1c pending            Thank you for your consult. I will follow-up with patient. Please contact us if you have any additional questions.    Magali Hanks MD  Cardiothoracic Surgery  [facility] Attending Note:    I have personally taken the history and examined this patient and agree with the resident's note as stated above. 42-year-old male with recent MI and severe multivessel disease.  I reviewed his angiogram with Dr. Conner, and discussed his case by phone with Dr. Patten.  The LAD never  does fill well on the angiogram.  Given the echo results, and an obvious embolic event to the leg, I am concerned that he has had a significant anterior MI.  Collectively we agreed upon a hybrid approach.  We will plan for percutaneous intervention for the right coronary and the circumflex.  If this provides imaging of the LAD that suggests it is a suitable target for bypass, then we will obtain a viability study. If there is a target, and viability, then we will proceed with staged arterial bypass to the anterior wall. If no target, or no viability, then plan for medical management after PCI.   Enbrel Pregnancy And Lactation Text: This medication is Pregnancy Category B and is considered safe during pregnancy. It is unknown if this medication is excreted in breast milk.

## 2022-05-13 ENCOUNTER — OFFICE VISIT (OUTPATIENT)
Dept: CARDIOLOGY | Facility: CLINIC | Age: 47
End: 2022-05-13
Payer: COMMERCIAL

## 2022-05-13 VITALS
SYSTOLIC BLOOD PRESSURE: 114 MMHG | DIASTOLIC BLOOD PRESSURE: 61 MMHG | RESPIRATION RATE: 18 BRPM | BODY MASS INDEX: 29.07 KG/M2 | WEIGHT: 191.81 LBS | OXYGEN SATURATION: 96 % | HEIGHT: 68 IN | HEART RATE: 82 BPM

## 2022-05-13 DIAGNOSIS — I74.9 ARTERIAL OCCLUSION DUE TO THROMBOEMBOLISM: ICD-10-CM

## 2022-05-13 DIAGNOSIS — R07.89 CHEST PAIN, ATYPICAL: ICD-10-CM

## 2022-05-13 DIAGNOSIS — E11.65 TYPE 2 DIABETES MELLITUS WITH HYPERGLYCEMIA, WITHOUT LONG-TERM CURRENT USE OF INSULIN: ICD-10-CM

## 2022-05-13 DIAGNOSIS — I25.10 CORONARY ARTERY DISEASE INVOLVING NATIVE CORONARY ARTERY OF NATIVE HEART WITHOUT ANGINA PECTORIS: Primary | ICD-10-CM

## 2022-05-13 DIAGNOSIS — E78.00 PURE HYPERCHOLESTEROLEMIA: ICD-10-CM

## 2022-05-13 DIAGNOSIS — I10 ESSENTIAL HYPERTENSION: ICD-10-CM

## 2022-05-13 PROCEDURE — 3074F SYST BP LT 130 MM HG: CPT | Mod: CPTII,S$GLB,, | Performed by: INTERNAL MEDICINE

## 2022-05-13 PROCEDURE — 4010F PR ACE/ARB THEARPY RXD/TAKEN: ICD-10-PCS | Mod: CPTII,S$GLB,, | Performed by: INTERNAL MEDICINE

## 2022-05-13 PROCEDURE — 3008F PR BODY MASS INDEX (BMI) DOCUMENTED: ICD-10-PCS | Mod: CPTII,S$GLB,, | Performed by: INTERNAL MEDICINE

## 2022-05-13 PROCEDURE — 99999 PR PBB SHADOW E&M-EST. PATIENT-LVL IV: ICD-10-PCS | Mod: PBBFAC,,, | Performed by: INTERNAL MEDICINE

## 2022-05-13 PROCEDURE — 3078F DIAST BP <80 MM HG: CPT | Mod: CPTII,S$GLB,, | Performed by: INTERNAL MEDICINE

## 2022-05-13 PROCEDURE — 3078F PR MOST RECENT DIASTOLIC BLOOD PRESSURE < 80 MM HG: ICD-10-PCS | Mod: CPTII,S$GLB,, | Performed by: INTERNAL MEDICINE

## 2022-05-13 PROCEDURE — 3074F PR MOST RECENT SYSTOLIC BLOOD PRESSURE < 130 MM HG: ICD-10-PCS | Mod: CPTII,S$GLB,, | Performed by: INTERNAL MEDICINE

## 2022-05-13 PROCEDURE — 99214 OFFICE O/P EST MOD 30 MIN: CPT | Mod: 25,S$GLB,, | Performed by: INTERNAL MEDICINE

## 2022-05-13 PROCEDURE — 4010F ACE/ARB THERAPY RXD/TAKEN: CPT | Mod: CPTII,S$GLB,, | Performed by: INTERNAL MEDICINE

## 2022-05-13 PROCEDURE — 1159F MED LIST DOCD IN RCRD: CPT | Mod: CPTII,S$GLB,, | Performed by: INTERNAL MEDICINE

## 2022-05-13 PROCEDURE — 1159F PR MEDICATION LIST DOCUMENTED IN MEDICAL RECORD: ICD-10-PCS | Mod: CPTII,S$GLB,, | Performed by: INTERNAL MEDICINE

## 2022-05-13 PROCEDURE — 93000 ELECTROCARDIOGRAM COMPLETE: CPT | Mod: S$GLB,,, | Performed by: INTERNAL MEDICINE

## 2022-05-13 PROCEDURE — 99214 PR OFFICE/OUTPT VISIT, EST, LEVL IV, 30-39 MIN: ICD-10-PCS | Mod: 25,S$GLB,, | Performed by: INTERNAL MEDICINE

## 2022-05-13 PROCEDURE — 99999 PR PBB SHADOW E&M-EST. PATIENT-LVL IV: CPT | Mod: PBBFAC,,, | Performed by: INTERNAL MEDICINE

## 2022-05-13 PROCEDURE — 3008F BODY MASS INDEX DOCD: CPT | Mod: CPTII,S$GLB,, | Performed by: INTERNAL MEDICINE

## 2022-05-13 PROCEDURE — 93000 EKG 12-LEAD: ICD-10-PCS | Mod: S$GLB,,, | Performed by: INTERNAL MEDICINE

## 2022-05-13 RX ORDER — METFORMIN HYDROCHLORIDE 1000 MG/1
1000 TABLET ORAL 2 TIMES DAILY WITH MEALS
COMMUNITY

## 2022-05-13 RX ORDER — ROSUVASTATIN CALCIUM 40 MG/1
10 TABLET, COATED ORAL NIGHTLY
COMMUNITY

## 2022-05-13 RX ORDER — FLUTICASONE PROPIONATE 50 UG/1
POWDER, METERED RESPIRATORY (INHALATION)
COMMUNITY

## 2022-05-13 NOTE — PROGRESS NOTES
Subjective:    Patient ID:  Kathy Vinson is a 46 y.o. male who presents for follow-up of Follow-up      HPI     CAD - NSTEMI RYLIE to LAD and RCA -  Cx  6/22/18, HTN, DM, HLD, LE arterial embolism - s/p 6 months of eliquis        Referred by Dr Patten  Mr Vinson is a 43 yo man with hypertension, DLPD, who was recently admitted for NSTEMI and s/p LAD PCI with 3.5  X 18 mm RYLIE and mid RCA PCI with 3.0 x 12 mm RYLIE and LCX/Om  who comes for clinical follow up.   Patient was supposed to have a stress test done today to evaluate underlying ischemia of the LCX/Om territory. He also had vascular US given luminal irregularities seen on angiogram.   Today he reports to be doing well and denies anginal symptoms.      Coronary artery disease involving native coronary artery of native heart  -Patient is totally asymptomatic at the moment. He is able to perform all his daily activities without any angina, palpitations or diaphoresis.   -Would continue with medical therapy for CAD and repeat 2D echo with CFD  -PET stress showed ischemia of the LCX/OM territory therefore we will do another angiogram with plan PCI  -LHC and coronary angiogram via R CFA  -IVF at 3 ml/hour  -Patient is a RYLIE candidate  -The risks, benefits & alternatives of the procedure were explained to the patient.    -The risks of coronary angiography include but are not limited to:  Bleeding, infection, heart rhythm abnormalities, allergic reactions, kidney injury, stroke and death.    -Should stenting be indicated, the patient has agreed to dual anti-platelet therapy for 1-consecutive year with a drug-eluting stent and a minimum of 1-month with the use of a bare metal stent.    -The risks of moderate sedation include hypotension, respiratory depression, arrhythmias, bronchospasm, & death.    -Informed consent was obtained & the patient is agreeable to proceed with the procedure.  -This patient was discussed with the attending interventional cardiologist who  agrees with the above assessment & plan.       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 There was an unsuccessful attempt at Cx .  Patent RCA and LAD stents.  Patient tolerated well the procedure and there were no complications.   He remained stable and was safely discharged home.      Stress test 10/6/21    Abnormal myocardial perfusion scan.    There is a moderate intensity, fixed defect consistent with scar in the inferolateral wall.    The gated perfusion images showed an ejection fraction of 44% post stress.    The EKG portion of this study is negative for ischemia.    The patient reported no chest pain during the stress test.    During stress, rare PVCs are noted.       Echo 8/2/19  · Normal left ventricular systolic function. The estimated ejection fraction is 60%  · Concentric left ventricular remodeling.  · No wall motion abnormalities.  · Normal right ventricular systolic function.     FRANCISCO 8/2/19  Normal FRANCISCO bilaterally.  Normal PVR waveforms bilaterally.      12/3/18 Has been on eliquis for 6 months  Denies CP or SOB  Did not do cardiac rehab  EKG NSR PRWP  Stop eliquis since he has been treated for 6 months due to arterial embolus  Cardiac stable  Declines cardiac rehab     3/14/19 Gets occasional CP improved with NTG  C/O ED  EKG NSR PRWP - similar to previous   Rx for viagra - instructed not to take with NTG  OV 6 months with echo and exercise myoview     7/22/19 EKG NSR PRWP  Denies significant CP or SOB  Reports some numbness and tingling on occasion in his RLE     2/11/20 Denies significant CP or SOB  Going to the gym regularly     8/27/20  Denies CP or SOB  Goes to the gym regularly  EKG NSR PRWP   BP controlled on current Rx  Denies angina  Requesting refill of ED medication  OV 6 months with echo     9/9/21 Staying active. Denies recent CP or SOB  EKG NSR old anterior MI   OV 1 month with echo and exercise myoview for CAD, CP  Continue Rx for CAD, HTN, DM, DM    5/13/22 Denies CP or SOB  BP controlled  EKG NSR minor NSSTT changes    Review of Systems   Constitutional: Negative for decreased appetite.   HENT: Negative for ear discharge.    Eyes: Negative for blurred vision.   Endocrine: Negative for polyphagia.   Skin: Negative for nail changes.   Genitourinary: Negative for bladder incontinence.   Neurological: Negative for aphonia.   Psychiatric/Behavioral: Negative for hallucinations.   Allergic/Immunologic: Negative for hives.        Objective:    Physical Exam  Constitutional:       Appearance: He is well-developed.   HENT:      Head: Normocephalic and atraumatic.   Eyes:      Conjunctiva/sclera: Conjunctivae normal.      Pupils: Pupils are equal, round, and reactive to light.   Cardiovascular:      Rate and Rhythm: Normal rate.      Pulses: Intact distal pulses.      Heart sounds: Normal heart sounds.   Pulmonary:      Effort: Pulmonary effort is normal.      Breath sounds: Normal breath sounds.   Abdominal:      General: Bowel sounds are normal.      Palpations: Abdomen is soft.   Musculoskeletal:         General: Normal range of motion.      Cervical back: Normal range of motion and neck supple.   Skin:     General: Skin is warm and dry.   Neurological:      Mental Status: He is alert and oriented to person, place, and time.           Assessment:       1. Coronary artery disease involving native coronary artery of native heart without angina pectoris    2. Essential hypertension    3. Pure hypercholesterolemia    4. 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 Type 2 diabetes mellitus with hyperglycemia, without long-term current use of  insulin    6. Chest pain, atypical         Plan:       Continue Rx for CAD, HTN, DM, HLD  OV 6 months

## 2023-02-09 ENCOUNTER — OFFICE VISIT (OUTPATIENT)
Dept: CARDIOLOGY | Facility: CLINIC | Age: 48
End: 2023-02-09
Payer: COMMERCIAL

## 2023-02-09 VITALS
RESPIRATION RATE: 18 BRPM | OXYGEN SATURATION: 98 % | HEART RATE: 80 BPM | SYSTOLIC BLOOD PRESSURE: 138 MMHG | WEIGHT: 203.06 LBS | BODY MASS INDEX: 30.87 KG/M2 | DIASTOLIC BLOOD PRESSURE: 86 MMHG

## 2023-02-09 DIAGNOSIS — E11.65 TYPE 2 DIABETES MELLITUS WITH HYPERGLYCEMIA, WITHOUT LONG-TERM CURRENT USE OF INSULIN: ICD-10-CM

## 2023-02-09 DIAGNOSIS — I10 ESSENTIAL HYPERTENSION: ICD-10-CM

## 2023-02-09 DIAGNOSIS — E78.00 PURE HYPERCHOLESTEROLEMIA: ICD-10-CM

## 2023-02-09 DIAGNOSIS — I25.10 CORONARY ARTERY DISEASE INVOLVING NATIVE CORONARY ARTERY OF NATIVE HEART WITHOUT ANGINA PECTORIS: ICD-10-CM

## 2023-02-09 DIAGNOSIS — I10 PRIMARY HYPERTENSION: Primary | ICD-10-CM

## 2023-02-09 DIAGNOSIS — R07.89 CHEST PAIN, ATYPICAL: ICD-10-CM

## 2023-02-09 PROCEDURE — 3008F BODY MASS INDEX DOCD: CPT | Mod: CPTII,S$GLB,, | Performed by: INTERNAL MEDICINE

## 2023-02-09 PROCEDURE — 3008F PR BODY MASS INDEX (BMI) DOCUMENTED: ICD-10-PCS | Mod: CPTII,S$GLB,, | Performed by: INTERNAL MEDICINE

## 2023-02-09 PROCEDURE — 99999 PR PBB SHADOW E&M-EST. PATIENT-LVL III: CPT | Mod: PBBFAC,,, | Performed by: INTERNAL MEDICINE

## 2023-02-09 PROCEDURE — 3079F PR MOST RECENT DIASTOLIC BLOOD PRESSURE 80-89 MM HG: ICD-10-PCS | Mod: CPTII,S$GLB,, | Performed by: INTERNAL MEDICINE

## 2023-02-09 PROCEDURE — 3079F DIAST BP 80-89 MM HG: CPT | Mod: CPTII,S$GLB,, | Performed by: INTERNAL MEDICINE

## 2023-02-09 PROCEDURE — 99214 OFFICE O/P EST MOD 30 MIN: CPT | Mod: S$GLB,,, | Performed by: INTERNAL MEDICINE

## 2023-02-09 PROCEDURE — 99999 PR PBB SHADOW E&M-EST. PATIENT-LVL III: ICD-10-PCS | Mod: PBBFAC,,, | Performed by: INTERNAL MEDICINE

## 2023-02-09 PROCEDURE — 93000 EKG 12-LEAD: ICD-10-PCS | Mod: S$GLB,,, | Performed by: INTERNAL MEDICINE

## 2023-02-09 PROCEDURE — 99214 PR OFFICE/OUTPT VISIT, EST, LEVL IV, 30-39 MIN: ICD-10-PCS | Mod: S$GLB,,, | Performed by: INTERNAL MEDICINE

## 2023-02-09 PROCEDURE — 3075F PR MOST RECENT SYSTOLIC BLOOD PRESS GE 130-139MM HG: ICD-10-PCS | Mod: CPTII,S$GLB,, | Performed by: INTERNAL MEDICINE

## 2023-02-09 PROCEDURE — 3075F SYST BP GE 130 - 139MM HG: CPT | Mod: CPTII,S$GLB,, | Performed by: INTERNAL MEDICINE

## 2023-02-09 PROCEDURE — 93000 ELECTROCARDIOGRAM COMPLETE: CPT | Mod: S$GLB,,, | Performed by: INTERNAL MEDICINE

## 2023-02-09 NOTE — PROGRESS NOTES
Subjective:    Patient ID:  Kathy Vinson is a 47 y.o. male who presents for follow-up of Follow-up (Cardiac clearance-colonoscopy/Plavix , asa advise)      HPI    CAD - NSTEMI RYLIE to LAD and RCA -  Cx  6/22/18, HTN, DM, HLD, LE arterial embolism - s/p 6 months of eliquis        Referred by Dr Patten  Mr Vinson is a 41 yo man with hypertension, DLPD, who was recently admitted for NSTEMI and s/p LAD PCI with 3.5  X 18 mm RYLIE and mid RCA PCI with 3.0 x 12 mm RYLIE and LCX/Om  who comes for clinical follow up.   Patient was supposed to have a stress test done today to evaluate underlying ischemia of the LCX/Om territory. He also had vascular US given luminal irregularities seen on angiogram.   Today he reports to be doing well and denies anginal symptoms.      Coronary artery disease involving native coronary artery of native heart  -Patient is totally asymptomatic at the moment. He is able to perform all his daily activities without any angina, palpitations or diaphoresis.   -Would continue with medical therapy for CAD and repeat 2D echo with CFD  -PET stress showed ischemia of the LCX/OM territory therefore we will do another angiogram with plan PCI  -LHC and coronary angiogram via R CFA  -IVF at 3 ml/hour  -Patient is a RYLIE candidate  -The risks, benefits & alternatives of the procedure were explained to the patient.    -The risks of coronary angiography include but are not limited to:  Bleeding, infection, heart rhythm abnormalities, allergic reactions, kidney injury, stroke and death.    -Should stenting be indicated, the patient has agreed to dual anti-platelet therapy for 1-consecutive year with a drug-eluting stent and a minimum of 1-month with the use of a bare metal stent.    -The risks of moderate sedation include hypotension, respiratory depression, arrhythmias, bronchospasm, & death.    -Informed consent was obtained & the patient is agreeable to proceed with the procedure.  -This patient was discussed  with the attending interventional cardiologist who agrees with the above assessment & plan.       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 There was an unsuccessful attempt at Cx .  Patent RCA and LAD stents.  Patient tolerated well the procedure and there were no complications.   He remained stable and was safely discharged home.      Stress test 10/6/21    Abnormal myocardial perfusion scan.    There is a moderate intensity, fixed defect consistent with scar in the inferolateral wall.    The gated perfusion images showed an ejection fraction of 44% post stress.    The EKG portion of this study is negative for ischemia.    The patient reported no chest pain during the stress test.    During stress, rare PVCs are noted.        Echo 8/2/19  Normal left ventricular systolic function. The estimated ejection fraction is 60%  Concentric left ventricular remodeling.  No wall motion abnormalities.  Normal right ventricular systolic function.     FRANCISCO 8/2/19  Normal FRANCISCO bilaterally.  Normal PVR waveforms bilaterally.      12/3/18 Has been on eliquis for 6 months  Denies CP or SOB  Did not do cardiac rehab  EKG NSR PRWP  Stop eliquis since he has been treated for 6 months due to arterial embolus  Cardiac stable  Declines cardiac rehab     3/14/19 Gets occasional CP improved with NTG  C/O ED  EKG NSR PRWP - similar to previous   Rx for viagra - instructed not to take with NTG  OV 6 months with echo and exercise myoview     7/22/19 EKG NSR PRWP  Denies significant CP or SOB  Reports some numbness and tingling on occasion in his RLE     2/11/20 Denies significant CP or  SOB  Going to the gym regularly     8/27/20 Denies CP or SOB  Goes to the gym regularly  EKG NSR PRWP   BP controlled on current Rx  Denies angina  Requesting refill of ED medication  OV 6 months with echo     9/9/21 Staying active. Denies recent CP or SOB  EKG NSR old anterior MI   OV 1 month with echo and exercise myoview for CAD, CP  Continue Rx for CAD, HTN, DM, DM     5/13/22 Denies CP or SOB  BP controlled  EKG NSR minor NSSTT changes  Continue Rx for CAD, HTN, DM, HLD  OV 6 months    2/9/23 Denies CP or SOB. Some upper abdominal discomfort  EKG NSR NSSTT changes  BP controlled  Needs clearance for colonoscopy    Review of Systems   Constitutional: Negative for decreased appetite.   HENT:  Negative for ear discharge.    Eyes:  Negative for blurred vision.   Endocrine: Negative for polyphagia.   Skin:  Negative for nail changes.   Genitourinary:  Negative for bladder incontinence.   Neurological:  Negative for aphonia.   Psychiatric/Behavioral:  Negative for hallucinations.    Allergic/Immunologic: Negative for hives.      Objective:    Physical Exam  Constitutional:       Appearance: He is well-developed.   HENT:      Head: Normocephalic and atraumatic.   Eyes:      Conjunctiva/sclera: Conjunctivae normal.      Pupils: Pupils are equal, round, and reactive to light.   Cardiovascular:      Rate and Rhythm: Normal rate.      Pulses: Intact distal pulses.      Heart sounds: Normal heart sounds.   Pulmonary:      Effort: Pulmonary effort is normal.      Breath sounds: Normal breath sounds.   Abdominal:      General: Bowel sounds are normal.      Palpations: Abdomen is soft.   Musculoskeletal:         General: Normal range of motion.      Cervical back: Normal range of motion and neck supple.   Skin:     General: Skin is warm and dry.   Neurological:      Mental Status: He is alert and oriented to person, place, and time.         Assessment:       1. Primary hypertension    2. Coronary artery disease involving  native coronary artery of native heart without angina pectoris    3. Essential hypertension    4. Pure hypercholesterolemia    5. Type 2 diabetes mellitus with hyperglycemia, without long-term current use of insulin    6. Chest pain, atypical         Plan:       Echo and lexiscan myoview for CAD - if stable will clear for colonoscopy    Continue Rx for CAD, HTN, DM, HLD

## 2023-03-09 ENCOUNTER — HOSPITAL ENCOUNTER (OUTPATIENT)
Dept: RADIOLOGY | Facility: HOSPITAL | Age: 48
Discharge: HOME | End: 2023-03-09
Attending: INTERNAL MEDICINE
Payer: COMMERCIAL

## 2023-03-09 ENCOUNTER — HOSPITAL ENCOUNTER (OUTPATIENT)
Dept: CARDIOLOGY | Facility: HOSPITAL | Age: 48
Discharge: HOME | End: 2023-03-09
Attending: INTERNAL MEDICINE
Payer: COMMERCIAL

## 2023-03-09 DIAGNOSIS — I10 ESSENTIAL HYPERTENSION: ICD-10-CM

## 2023-03-09 DIAGNOSIS — I10 PRIMARY HYPERTENSION: ICD-10-CM

## 2023-03-09 DIAGNOSIS — R07.89 CHEST PAIN, ATYPICAL: ICD-10-CM

## 2023-03-09 DIAGNOSIS — E78.00 PURE HYPERCHOLESTEROLEMIA: ICD-10-CM

## 2023-03-09 DIAGNOSIS — I25.10 CORONARY ARTERY DISEASE INVOLVING NATIVE CORONARY ARTERY OF NATIVE HEART WITHOUT ANGINA PECTORIS: ICD-10-CM

## 2023-03-09 DIAGNOSIS — E11.65 TYPE 2 DIABETES MELLITUS WITH HYPERGLYCEMIA, WITHOUT LONG-TERM CURRENT USE OF INSULIN: ICD-10-CM

## 2023-03-09 LAB
AORTIC ROOT ANNULUS: 3.16 CM
AORTIC VALVE CUSP SEPERATION: 2.44 CM
ASCENDING AORTA: 3.01 CM
AV INDEX (PROSTH): 0.94
AV MEAN GRADIENT: 3 MMHG
AV PEAK GRADIENT: 5 MMHG
AV VALVE AREA: 4.67 CM2
AV VELOCITY RATIO: 0.98
CV ECHO LV RWT: 0.58 CM
CV STRESS BASE HR: 62 BPM
DIASTOLIC BLOOD PRESSURE: 73 MMHG
DOP CALC AO PEAK VEL: 1.11 M/S
DOP CALC AO VTI: 27 CM
DOP CALC LVOT AREA: 5 CM2
DOP CALC LVOT DIAMETER: 2.52 CM
DOP CALC LVOT PEAK VEL: 1.09 M/S
DOP CALC LVOT STROKE VOLUME: 126.12 CM3
DOP CALCLVOT PEAK VEL VTI: 25.3 CM
E WAVE DECELERATION TIME: 161.31 MSEC
E/A RATIO: 1.06
E/E' RATIO: 11.33 M/S
ECHO LV POSTERIOR WALL: 1.44 CM (ref 0.6–1.1)
EJECTION FRACTION: 55 %
FRACTIONAL SHORTENING: 28 % (ref 28–44)
INTERVENTRICULAR SEPTUM: 1.64 CM (ref 0.6–1.1)
IVC DIAMETER: 15 CM
LA MAJOR: 5.02 CM
LA MINOR: 4.39 CM
LA WIDTH: 4.4 CM
LEFT ATRIUM SIZE: 3.89 CM
LEFT ATRIUM VOLUME: 68.14 CM3
LEFT INTERNAL DIMENSION IN SYSTOLE: 3.55 CM (ref 2.1–4)
LEFT VENTRICLE DIASTOLIC VOLUME: 116.2 ML
LEFT VENTRICLE SYSTOLIC VOLUME: 52.57 ML
LEFT VENTRICULAR INTERNAL DIMENSION IN DIASTOLE: 4.96 CM (ref 3.5–6)
LEFT VENTRICULAR MASS: 331.48 G
LV LATERAL E/E' RATIO: 8.5 M/S
LV SEPTAL E/E' RATIO: 17 M/S
LVOT MG: 2.73 MMHG
LVOT MV: 0.77 CM/S
MV PEAK A VEL: 0.8 M/S
MV PEAK E VEL: 0.85 M/S
MV STENOSIS PRESSURE HALF TIME: 52.78 MS
MV VALVE AREA P 1/2 METHOD: 4.17 CM2
NUC REST EJECTION FRACTION: 57
OHS CV CPX 85 PERCENT MAX PREDICTED HEART RATE MALE: 145
OHS CV CPX MAX PREDICTED HEART RATE: 171
OHS CV CPX PATIENT IS FEMALE: 0
OHS CV CPX PATIENT IS MALE: 1
OHS CV CPX PEAK DIASTOLIC BLOOD PRESSURE: 72 MMHG
OHS CV CPX PEAK HEAR RATE: 83 BPM
OHS CV CPX PEAK RATE PRESSURE PRODUCT: NORMAL
OHS CV CPX PEAK SYSTOLIC BLOOD PRESSURE: 124 MMHG
OHS CV CPX PERCENT MAX PREDICTED HEART RATE ACHIEVED: 49
OHS CV CPX RATE PRESSURE PRODUCT PRESENTING: 7626
PISA MRMAX VEL: 5.23 M/S
PISA TR MAX VEL: 2.66 M/S
PV PEAK VELOCITY: 0.76 CM/S
RA MAJOR: 4.79 CM
RA PRESSURE: 3 MMHG
RA WIDTH: 4.2 CM
RIGHT VENTRICULAR END-DIASTOLIC DIMENSION: 3.42 CM
SINUS: 3.34 CM
STJ: 2.97 CM
SYSTOLIC BLOOD PRESSURE: 123 MMHG
TDI LATERAL: 0.1 M/S
TDI SEPTAL: 0.05 M/S
TDI: 0.08 M/S
TR MAX PG: 28 MMHG
TRICUSPID ANNULAR PLANE SYSTOLIC EXCURSION: 2.45 CM
TV REST PULMONARY ARTERY PRESSURE: 31 MMHG

## 2023-03-09 PROCEDURE — 78452 HT MUSCLE IMAGE SPECT MULT: CPT

## 2023-03-09 PROCEDURE — 93018 NUCLEAR STRESS - CARDIOLOGY INTERPRETED (CUPID ONLY): ICD-10-PCS | Mod: ,,, | Performed by: INTERNAL MEDICINE

## 2023-03-09 PROCEDURE — 78452 HT MUSCLE IMAGE SPECT MULT: CPT | Mod: 26,,, | Performed by: INTERNAL MEDICINE

## 2023-03-09 PROCEDURE — 63600175 PHARM REV CODE 636 W HCPCS: Performed by: INTERNAL MEDICINE

## 2023-03-09 PROCEDURE — 93306 TTE W/DOPPLER COMPLETE: CPT

## 2023-03-09 PROCEDURE — 93306 ECHO (CUPID ONLY): ICD-10-PCS | Mod: 26,,, | Performed by: INTERNAL MEDICINE

## 2023-03-09 PROCEDURE — 93016 CV STRESS TEST SUPVJ ONLY: CPT | Mod: ,,, | Performed by: INTERNAL MEDICINE

## 2023-03-09 PROCEDURE — 93018 CV STRESS TEST I&R ONLY: CPT | Mod: ,,, | Performed by: INTERNAL MEDICINE

## 2023-03-09 PROCEDURE — 93016 NUCLEAR STRESS - CARDIOLOGY INTERPRETED (CUPID ONLY): ICD-10-PCS | Mod: ,,, | Performed by: INTERNAL MEDICINE

## 2023-03-09 PROCEDURE — 93306 TTE W/DOPPLER COMPLETE: CPT | Mod: 26,,, | Performed by: INTERNAL MEDICINE

## 2023-03-09 PROCEDURE — 93017 CV STRESS TEST TRACING ONLY: CPT

## 2023-03-09 PROCEDURE — 78452 NUCLEAR STRESS - CARDIOLOGY INTERPRETED (CUPID ONLY): ICD-10-PCS | Mod: 26,,, | Performed by: INTERNAL MEDICINE

## 2023-03-09 PROCEDURE — A9502 TC99M TETROFOSMIN: HCPCS

## 2023-03-09 RX ORDER — REGADENOSON 0.08 MG/ML
0.4 INJECTION, SOLUTION INTRAVENOUS ONCE
Status: COMPLETED | OUTPATIENT
Start: 2023-03-09 | End: 2023-03-09

## 2023-03-09 RX ADMIN — REGADENOSON 0.4 MG: 0.08 INJECTION, SOLUTION INTRAVENOUS at 09:03

## 2023-03-14 ENCOUNTER — OFFICE VISIT (OUTPATIENT)
Dept: CARDIOLOGY | Facility: CLINIC | Age: 48
End: 2023-03-14
Payer: COMMERCIAL

## 2023-03-14 VITALS
SYSTOLIC BLOOD PRESSURE: 134 MMHG | HEIGHT: 68 IN | RESPIRATION RATE: 18 BRPM | OXYGEN SATURATION: 97 % | WEIGHT: 196.31 LBS | DIASTOLIC BLOOD PRESSURE: 78 MMHG | BODY MASS INDEX: 29.75 KG/M2 | HEART RATE: 88 BPM

## 2023-03-14 DIAGNOSIS — E11.65 TYPE 2 DIABETES MELLITUS WITH HYPERGLYCEMIA, WITHOUT LONG-TERM CURRENT USE OF INSULIN: ICD-10-CM

## 2023-03-14 DIAGNOSIS — I10 PRIMARY HYPERTENSION: ICD-10-CM

## 2023-03-14 DIAGNOSIS — E78.00 PURE HYPERCHOLESTEROLEMIA: ICD-10-CM

## 2023-03-14 DIAGNOSIS — I25.10 CORONARY ARTERY DISEASE INVOLVING NATIVE CORONARY ARTERY OF NATIVE HEART WITHOUT ANGINA PECTORIS: Primary | ICD-10-CM

## 2023-03-14 PROCEDURE — 99214 PR OFFICE/OUTPT VISIT, EST, LEVL IV, 30-39 MIN: ICD-10-PCS | Mod: S$GLB,,, | Performed by: INTERNAL MEDICINE

## 2023-03-14 PROCEDURE — 3078F PR MOST RECENT DIASTOLIC BLOOD PRESSURE < 80 MM HG: ICD-10-PCS | Mod: CPTII,S$GLB,, | Performed by: INTERNAL MEDICINE

## 2023-03-14 PROCEDURE — 3075F SYST BP GE 130 - 139MM HG: CPT | Mod: CPTII,S$GLB,, | Performed by: INTERNAL MEDICINE

## 2023-03-14 PROCEDURE — 1159F MED LIST DOCD IN RCRD: CPT | Mod: CPTII,S$GLB,, | Performed by: INTERNAL MEDICINE

## 2023-03-14 PROCEDURE — 3075F PR MOST RECENT SYSTOLIC BLOOD PRESS GE 130-139MM HG: ICD-10-PCS | Mod: CPTII,S$GLB,, | Performed by: INTERNAL MEDICINE

## 2023-03-14 PROCEDURE — 3008F PR BODY MASS INDEX (BMI) DOCUMENTED: ICD-10-PCS | Mod: CPTII,S$GLB,, | Performed by: INTERNAL MEDICINE

## 2023-03-14 PROCEDURE — 99214 OFFICE O/P EST MOD 30 MIN: CPT | Mod: S$GLB,,, | Performed by: INTERNAL MEDICINE

## 2023-03-14 PROCEDURE — 99999 PR PBB SHADOW E&M-EST. PATIENT-LVL IV: ICD-10-PCS | Mod: PBBFAC,,, | Performed by: INTERNAL MEDICINE

## 2023-03-14 PROCEDURE — 99999 PR PBB SHADOW E&M-EST. PATIENT-LVL IV: CPT | Mod: PBBFAC,,, | Performed by: INTERNAL MEDICINE

## 2023-03-14 PROCEDURE — 3078F DIAST BP <80 MM HG: CPT | Mod: CPTII,S$GLB,, | Performed by: INTERNAL MEDICINE

## 2023-03-14 PROCEDURE — 3008F BODY MASS INDEX DOCD: CPT | Mod: CPTII,S$GLB,, | Performed by: INTERNAL MEDICINE

## 2023-03-14 PROCEDURE — 1159F PR MEDICATION LIST DOCUMENTED IN MEDICAL RECORD: ICD-10-PCS | Mod: CPTII,S$GLB,, | Performed by: INTERNAL MEDICINE

## 2023-03-14 NOTE — PROGRESS NOTES
Subjective:    Patient ID:  Kathy Vinson is a 47 y.o. male who presents for follow-up of Results      HPI       CAD - NSTEMI RYLIE to LAD and RCA -  Cx  6/22/18, HTN, DM, HLD, LE arterial embolism - s/p 6 months of eliquis        Referred by Dr Patten  Mr Vinson is a 43 yo man with hypertension, DLPD, who was recently admitted for NSTEMI and s/p LAD PCI with 3.5  X 18 mm RYLIE and mid RCA PCI with 3.0 x 12 mm RYLIE and LCX/Om  who comes for clinical follow up.   Patient was supposed to have a stress test done today to evaluate underlying ischemia of the LCX/Om territory. He also had vascular US given luminal irregularities seen on angiogram.   Today he reports to be doing well and denies anginal symptoms.      Coronary artery disease involving native coronary artery of native heart  -Patient is totally asymptomatic at the moment. He is able to perform all his daily activities without any angina, palpitations or diaphoresis.   -Would continue with medical therapy for CAD and repeat 2D echo with CFD  -PET stress showed ischemia of the LCX/OM territory therefore we will do another angiogram with plan PCI  -LHC and coronary angiogram via R CFA  -IVF at 3 ml/hour  -Patient is a RYLIE candidate  -The risks, benefits & alternatives of the procedure were explained to the patient.    -The risks of coronary angiography include but are not limited to:  Bleeding, infection, heart rhythm abnormalities, allergic reactions, kidney injury, stroke and death.    -Should stenting be indicated, the patient has agreed to dual anti-platelet therapy for 1-consecutive year with a drug-eluting stent and a minimum of 1-month with the use of a bare metal stent.    -The risks of moderate sedation include hypotension, respiratory depression, arrhythmias, bronchospasm, & death.    -Informed consent was obtained & the patient is agreeable to proceed with the procedure.  -This patient was discussed with the attending interventional cardiologist who  agrees with the above assessment & plan.       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 There was an unsuccessful attempt at Cx .  Patent RCA and LAD stents.  Patient tolerated well the procedure and there were no complications.   He remained stable and was safely discharged home.      Stress test 3/9/23    Abnormal myocardial perfusion scan.    There is a moderate to severe intensity, moderate to large sized, fixed perfusion abnormality consistent with scar in the lateral and inferolateral wall(s).    There are no other significant perfusion abnormalities.    The gated perfusion images showed an ejection fraction of 57% at rest.    There is normal wall motion at rest and post stress.    The ECG portion of the study is negative for ischemia.    The patient reported no chest pain during the stress test.    There were no arrhythmias during stress.  Similar to stress test 10/6/21      Echo 3/9/23  The estimated ejection fraction is 55%.  The left ventricle is normal in size with concentric hypertrophy and normal systolic function.  Normal left ventricular diastolic function.  Mild pulmonic regurgitation.  Mild mitral regurgitation.  Moderate to severe left atrial enlargement.  Moderate right atrial enlargement.  Normal right ventricular size with normal right ventricular systolic function.  Mild tricuspid regurgitation.  Normal central venous pressure (3 mmHg).  The estimated PA systolic pressure is 31 mmHg.     FRANCISCO 8/2/19  Normal FRANCISCO bilaterally.  Normal PVR waveforms bilaterally.      12/3/18 Has been on eliquis for 6 months  Denies CP or SOB  Did not do cardiac  rehab  EKG NSR PRWP  Stop eliquis since he has been treated for 6 months due to arterial embolus  Cardiac stable  Declines cardiac rehab     3/14/19 Gets occasional CP improved with NTG  C/O ED  EKG NSR PRWP - similar to previous   Rx for viagra - instructed not to take with NTG  OV 6 months with echo and exercise myoview     7/22/19 EKG NSR PRWP  Denies significant CP or SOB  Reports some numbness and tingling on occasion in his RLE     2/11/20 Denies significant CP or SOB  Going to the gym regularly     8/27/20 Denies CP or SOB  Goes to the gym regularly  EKG NSR PRWP   BP controlled on current Rx  Denies angina  Requesting refill of ED medication  OV 6 months with echo     9/9/21 Staying active. Denies recent CP or SOB  EKG NSR old anterior MI   OV 1 month with echo and exercise myoview for CAD, CP  Continue Rx for CAD, HTN, DM, DM     5/13/22 Denies CP or SOB  BP controlled  EKG NSR minor NSSTT changes  Continue Rx for CAD, HTN, DM, HLD  OV 6 months     2/9/23 Denies CP or SOB. Some upper abdominal discomfort  EKG NSR NSSTT changes  BP controlled  Needs clearance for colonoscopy  Echo and lexiscan myoview for CAD - if stable will clear for colonoscopy    Continue Rx for CAD, HTN, DM, HLD     3/14/23 Denies CP or SOB. Some numbness in his fingertips  BP controlled    Review of Systems   Constitutional: Negative for decreased appetite.   HENT:  Negative for ear discharge.    Eyes:  Negative for blurred vision.   Endocrine: Negative for polyphagia.   Skin:  Negative for nail changes.   Genitourinary:  Negative for bladder incontinence.   Neurological:  Negative for aphonia.   Psychiatric/Behavioral:  Negative for hallucinations.    Allergic/Immunologic: Negative for hives.      Objective:    Physical Exam  Constitutional:       Appearance: He is well-developed.   HENT:      Head: Normocephalic and atraumatic.   Eyes:      Conjunctiva/sclera: Conjunctivae normal.      Pupils: Pupils are equal, round, and reactive to  light.   Cardiovascular:      Rate and Rhythm: Normal rate.      Pulses: Intact distal pulses.      Heart sounds: Normal heart sounds.   Pulmonary:      Effort: Pulmonary effort is normal.      Breath sounds: Normal breath sounds.   Abdominal:      General: Bowel sounds are normal.      Palpations: Abdomen is soft.   Musculoskeletal:         General: Normal range of motion.      Cervical back: Normal range of motion and neck supple.   Skin:     General: Skin is warm and dry.   Neurological:      Mental Status: He is alert and oriented to person, place, and time.         Assessment:       1. Coronary artery disease involving native coronary artery of native heart without angina pectoris    2. Primary hypertension    3. Pure hypercholesterolemia    4. Type 2 diabetes mellitus with hyperglycemia, without long-term current use of insulin         Plan:       Stable echo and stress findings  Cleared for colonoscopy and ENT surgery at low cardiac risk - ok to hold plavix 5 days before    Continue Rx for CAD, HTN, DM, HLD  OV 6 months

## 2023-03-17 ENCOUNTER — TELEPHONE (OUTPATIENT)
Dept: CARDIOLOGY | Facility: CLINIC | Age: 48
End: 2023-03-17
Payer: COMMERCIAL

## 2023-03-17 NOTE — TELEPHONE ENCOUNTER
Call back placed to inform them that we do not have the request. Refax requested. Verbalized okay.           ----- Message from Julia Ham sent at 3/17/2023  8:35 AM CDT -----  Regarding: [phone]  Type: Patient Call Back    Who called:Alli BERUMEN    What is the request in detail: she states she is checking in on the pts clearance for being off of the PLAVIX for him to have his colonoscopy, Pt can not be scheduled until they have clearance for him to be off of his blood thinners.    Can the clinic reply by [facility]?no    Would the patient rather a call back or a response via [facility]? Call back    Best call back number:[phone] ext [phone]    Additional Information: fax over clearance to [phone]

## 2024-04-08 ENCOUNTER — TELEPHONE (OUTPATIENT)
Dept: CARDIOLOGY | Facility: CLINIC | Age: 49
End: 2024-04-08
Payer: COMMERCIAL

## 2024-05-02 ENCOUNTER — OFFICE VISIT (OUTPATIENT)
Dept: CARDIOLOGY | Facility: CLINIC | Age: 49
End: 2024-05-02
Payer: COMMERCIAL

## 2024-05-02 VITALS
HEIGHT: 68 IN | SYSTOLIC BLOOD PRESSURE: 120 MMHG | HEART RATE: 82 BPM | WEIGHT: 183.44 LBS | DIASTOLIC BLOOD PRESSURE: 78 MMHG | BODY MASS INDEX: 27.8 KG/M2 | OXYGEN SATURATION: 98 %

## 2024-05-02 DIAGNOSIS — I25.10 CORONARY ARTERY DISEASE INVOLVING NATIVE CORONARY ARTERY OF NATIVE HEART WITHOUT ANGINA PECTORIS: ICD-10-CM

## 2024-05-02 DIAGNOSIS — E78.00 PURE HYPERCHOLESTEROLEMIA: ICD-10-CM

## 2024-05-02 DIAGNOSIS — I10 ESSENTIAL HYPERTENSION: ICD-10-CM

## 2024-05-02 DIAGNOSIS — I10 PRIMARY HYPERTENSION: ICD-10-CM

## 2024-05-02 DIAGNOSIS — I74.9 ARTERIAL OCCLUSION DUE TO THROMBOEMBOLISM: Primary | ICD-10-CM

## 2024-05-02 PROCEDURE — 1159F MED LIST DOCD IN RCRD: CPT | Mod: CPTII,S$GLB,, | Performed by: INTERNAL MEDICINE

## 2024-05-02 PROCEDURE — 93000 ELECTROCARDIOGRAM COMPLETE: CPT | Mod: S$GLB,,, | Performed by: INTERNAL MEDICINE

## 2024-05-02 PROCEDURE — 3008F BODY MASS INDEX DOCD: CPT | Mod: CPTII,S$GLB,, | Performed by: INTERNAL MEDICINE

## 2024-05-02 PROCEDURE — 3074F SYST BP LT 130 MM HG: CPT | Mod: CPTII,S$GLB,, | Performed by: INTERNAL MEDICINE

## 2024-05-02 PROCEDURE — 3078F DIAST BP <80 MM HG: CPT | Mod: CPTII,S$GLB,, | Performed by: INTERNAL MEDICINE

## 2024-05-02 PROCEDURE — 99999 PR PBB SHADOW E&M-EST. PATIENT-LVL IV: CPT | Mod: PBBFAC,,, | Performed by: INTERNAL MEDICINE

## 2024-05-02 PROCEDURE — 99214 OFFICE O/P EST MOD 30 MIN: CPT | Mod: S$GLB,,, | Performed by: INTERNAL MEDICINE

## 2024-05-02 RX ORDER — SPIRONOLACTONE 25 MG/1
12.5 TABLET ORAL DAILY
Qty: 45 TABLET | Refills: 3 | Status: SHIPPED | OUTPATIENT
Start: 2024-05-02 | End: 2025-05-02

## 2024-05-02 RX ORDER — METOPROLOL TARTRATE 50 MG/1
50 TABLET ORAL 2 TIMES DAILY
Qty: 180 TABLET | Refills: 3 | Status: SHIPPED | OUTPATIENT
Start: 2024-05-02 | End: 2025-05-02

## 2024-05-02 RX ORDER — ROSUVASTATIN CALCIUM 40 MG/1
40 TABLET, COATED ORAL NIGHTLY
Qty: 90 TABLET | Refills: 3 | Status: SHIPPED | OUTPATIENT
Start: 2024-05-02

## 2024-05-02 RX ORDER — CLOPIDOGREL BISULFATE 75 MG/1
75 TABLET ORAL DAILY
Qty: 90 TABLET | Refills: 3 | Status: SHIPPED | OUTPATIENT
Start: 2024-05-02

## 2024-05-02 RX ORDER — NITROGLYCERIN 0.4 MG/1
0.4 TABLET SUBLINGUAL
Qty: 25 TABLET | Refills: 11 | Status: SHIPPED | OUTPATIENT
Start: 2024-05-02 | End: 2025-05-02

## 2024-05-02 RX ORDER — LISINOPRIL 10 MG/1
10 TABLET ORAL DAILY
Qty: 90 TABLET | Refills: 3 | Status: SHIPPED | OUTPATIENT
Start: 2024-05-02 | End: 2025-05-02

## 2024-05-02 NOTE — PROGRESS NOTES
Subjective   Patient ID:  Ktahy Vinson is a 48 y.o. male who presents for follow-up of No chief complaint on file.      HPI      CAD - NSTEMI RYLIE to LAD and RCA -  Cx  6/22/18, HTN, DM, HLD, LE arterial embolism - s/p 6 months of eliquis        Referred by Dr Patten  Mr Vinson is a 41 yo man with hypertension, DLPD, who was recently admitted for NSTEMI and s/p LAD PCI with 3.5  X 18 mm RYLIE and mid RCA PCI with 3.0 x 12 mm RYLIE and LCX/Om  who comes for clinical follow up.   Patient was supposed to have a stress test done today to evaluate underlying ischemia of the LCX/Om territory. He also had vascular US given luminal irregularities seen on angiogram.   Today he reports to be doing well and denies anginal symptoms.      Coronary artery disease involving native coronary artery of native heart  -Patient is totally asymptomatic at the moment. He is able to perform all his daily activities without any angina, palpitations or diaphoresis.   -Would continue with medical therapy for CAD and repeat 2D echo with CFD  -PET stress showed ischemia of the LCX/OM territory therefore we will do another angiogram with plan PCI  -LHC and coronary angiogram via R CFA  -IVF at 3 ml/hour  -Patient is a RYLIE candidate  -The risks, benefits & alternatives of the procedure were explained to the patient.    -The risks of coronary angiography include but are not limited to:  Bleeding, infection, heart rhythm abnormalities, allergic reactions, kidney injury, stroke and death.    -Should stenting be indicated, the patient has agreed to dual anti-platelet therapy for 1-consecutive year with a drug-eluting stent and a minimum of 1-month with the use of a bare metal stent.    -The risks of moderate sedation include hypotension, respiratory depression, arrhythmias, bronchospasm, & death.    -Informed consent was obtained & the patient is agreeable to proceed with the procedure.  -This patient was discussed with the attending interventional  cardiologist who agrees with the above assessment & plan.       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 There was an unsuccessful attempt at Cx .  Patent RCA and LAD stents.  Patient tolerated well the procedure and there were no complications.   He remained stable and was safely discharged home.      Stress test 3/9/23    Abnormal myocardial perfusion scan.    There is a moderate to severe intensity, moderate to large sized, fixed perfusion abnormality consistent with scar in the lateral and inferolateral wall(s).    There are no other significant perfusion abnormalities.    The gated perfusion images showed an ejection fraction of 57% at rest.    There is normal wall motion at rest and post stress.    The ECG portion of the study is negative for ischemia.    The patient reported no chest pain during the stress test.    There were no arrhythmias during stress.  Similar to stress test 10/6/21      Echo 3/9/23  The estimated ejection fraction is 55%.  The left ventricle is normal in size with concentric hypertrophy and normal systolic function.  Normal left ventricular diastolic function.  Mild pulmonic regurgitation.  Mild mitral regurgitation.  Moderate to severe left atrial enlargement.  Moderate right atrial enlargement.  Normal right ventricular size with normal right ventricular systolic function.  Mild tricuspid regurgitation.  Normal central venous pressure (3 mmHg).  The estimated PA systolic pressure is 31 mmHg.     FRACNISCO 8/2/19  Normal FRANCISCO bilaterally.  Normal PVR waveforms bilaterally.      12/3/18 Has been on eliquis for 6 months  Denies CP or SOB  Did  not do cardiac rehab  EKG NSR PRWP  Stop eliquis since he has been treated for 6 months due to arterial embolus  Cardiac stable  Declines cardiac rehab     3/14/19 Gets occasional CP improved with NTG  C/O ED  EKG NSR PRWP - similar to previous   Rx for viagra - instructed not to take with NTG  OV 6 months with echo and exercise myoview     7/22/19 EKG NSR PRWP  Denies significant CP or SOB  Reports some numbness and tingling on occasion in his RLE     2/11/20 Denies significant CP or SOB  Going to the gym regularly     8/27/20 Denies CP or SOB  Goes to the gym regularly  EKG NSR PRWP   BP controlled on current Rx  Denies angina  Requesting refill of ED medication  OV 6 months with echo     9/9/21 Staying active. Denies recent CP or SOB  EKG NSR old anterior MI   OV 1 month with echo and exercise myoview for CAD, CP  Continue Rx for CAD, HTN, DM, DM     5/13/22 Denies CP or SOB  BP controlled  EKG NSR minor NSSTT changes  Continue Rx for CAD, HTN, DM, HLD  OV 6 months     2/9/23 Denies CP or SOB. Some upper abdominal discomfort  EKG NSR NSSTT changes  BP controlled  Needs clearance for colonoscopy  Echo and lexiscan myoview for CAD - if stable will clear for colonoscopy    Continue Rx for CAD, HTN, DM, HLD     3/14/23 Denies CP or SOB. Some numbness in his fingertips  BP controlled  Stable echo and stress findings  Cleared for colonoscopy and ENT surgery at low cardiac risk - ok to hold plavix 5 days before    Continue Rx for CAD, HTN, DM, HLD  OV 6 months      5/2/24 Denies recent CP or SOB  BP controlled  EKG NSR LVH    Review of Systems   Constitutional: Negative for decreased appetite.   HENT:  Negative for ear discharge.    Eyes:  Negative for blurred vision.   Endocrine: Negative for polyphagia.   Skin:  Negative for nail changes.   Genitourinary:  Negative for bladder incontinence.   Neurological:  Negative for aphonia.   Psychiatric/Behavioral:  Negative for hallucinations.    Allergic/Immunologic: Negative  for hives.          Objective     Physical Exam  Constitutional:       Appearance: He is well-developed.   HENT:      Head: Normocephalic and atraumatic.   Eyes:      Conjunctiva/sclera: Conjunctivae normal.      Pupils: Pupils are equal, round, and reactive to light.   Cardiovascular:      Rate and Rhythm: Normal rate.      Pulses: Intact distal pulses.      Heart sounds: Normal heart sounds.   Pulmonary:      Effort: Pulmonary effort is normal.      Breath sounds: Normal breath sounds.   Abdominal:      General: Bowel sounds are normal.      Palpations: Abdomen is soft.   Musculoskeletal:         General: Normal range of motion.      Cervical back: Normal range of motion and neck supple.   Skin:     General: Skin is warm and dry.   Neurological:      Mental Status: He is alert and oriented to person, place, and time.            Assessment and Plan     1. 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 Pure hypercholesterolemia    3. Essential hypertension    4. Coronary artery disease involving native coronary artery of native heart without angina pectoris    5. Primary hypertension        Plan:    Continue Rx for CAD, HTN, DM, HLD  OV 6 months with echo and [employer]       Advance Care Planning     Date: 05/02/2024  Patient did not wish or was not able to name a surrogate decision maker or provide an Advance Care Plan.

## 2024-05-04 LAB
OHS QRS DURATION: 102 MS
OHS QTC CALCULATION: 429 MS

## 2024-10-30 ENCOUNTER — TELEPHONE (OUTPATIENT)
Dept: CARDIOLOGY | Facility: CLINIC | Age: 49
End: 2024-10-30
Payer: COMMERCIAL

## 2024-11-25 ENCOUNTER — HOSPITAL ENCOUNTER (OUTPATIENT)
Dept: RADIOLOGY | Facility: HOSPITAL | Age: 49
Discharge: HOME | End: 2024-11-25
Attending: INTERNAL MEDICINE
Payer: COMMERCIAL

## 2024-11-25 ENCOUNTER — HOSPITAL ENCOUNTER (OUTPATIENT)
Dept: CARDIOLOGY | Facility: HOSPITAL | Age: 49
Discharge: HOME | End: 2024-11-25
Attending: INTERNAL MEDICINE
Payer: COMMERCIAL

## 2024-11-25 DIAGNOSIS — I10 PRIMARY HYPERTENSION: ICD-10-CM

## 2024-11-25 DIAGNOSIS — I74.9 ARTERIAL OCCLUSION DUE TO THROMBOEMBOLISM: ICD-10-CM

## 2024-11-25 DIAGNOSIS — I25.10 CORONARY ARTERY DISEASE INVOLVING NATIVE CORONARY ARTERY OF NATIVE HEART WITHOUT ANGINA PECTORIS: ICD-10-CM

## 2024-11-25 DIAGNOSIS — E78.00 PURE HYPERCHOLESTEROLEMIA: ICD-10-CM

## 2024-11-25 DIAGNOSIS — I10 ESSENTIAL HYPERTENSION: ICD-10-CM

## 2024-11-25 LAB
ASCENDING AORTA: 3.13 CM
AV INDEX (PROSTH): 0.85
AV MEAN GRADIENT: 3.4 MMHG
AV PEAK GRADIENT: 5.8 MMHG
AV VALVE AREA BY VELOCITY RATIO: 2.4 CM²
AV VALVE AREA: 2.7 CM²
AV VELOCITY RATIO: 0.75
CV ECHO LV RWT: 0.5 CM
CV STRESS BASE HR: 84 BPM
DIASTOLIC BLOOD PRESSURE: 67 MMHG
DOP CALC AO PEAK VEL: 1.2 M/S
DOP CALC AO VTI: 21.1 CM
DOP CALC LVOT AREA: 3.1 CM2
DOP CALC LVOT DIAMETER: 2 CM
DOP CALC LVOT PEAK VEL: 0.9 M/S
DOP CALC LVOT STROKE VOLUME: 56.2 CM3
DOP CALCLVOT PEAK VEL VTI: 17.9 CM
E WAVE DECELERATION TIME: 141.77 MSEC
E/A RATIO: 0.85
E/E' RATIO: 10.17 M/S
ECHO LV POSTERIOR WALL: 1.2 CM (ref 0.6–1.1)
FRACTIONAL SHORTENING: 33.3 % (ref 28–44)
INTERVENTRICULAR SEPTUM: 1.3 CM (ref 0.6–1.1)
IVC DIAMETER: 1.38 CM
LA MAJOR: 3.44 CM
LA MINOR: 4.02 CM
LA WIDTH: 4.4 CM
LEFT ATRIUM SIZE: 3.81 CM
LEFT ATRIUM VOLUME: 52.83 CM3
LEFT INTERNAL DIMENSION IN SYSTOLE: 3.2 CM (ref 2.1–4)
LEFT VENTRICLE DIASTOLIC VOLUME: 105.03 ML
LEFT VENTRICLE SYSTOLIC VOLUME: 41.3 ML
LEFT VENTRICULAR INTERNAL DIMENSION IN DIASTOLE: 4.8 CM (ref 3.5–6)
LEFT VENTRICULAR MASS: 232.2 G
LV LATERAL E/E' RATIO: 7.63 M/S
LV SEPTAL E/E' RATIO: 15.25 M/S
LVED V (TEICH): 105.03 ML
LVES V (TEICH): 41.3 ML
LVOT MG: 1.81 MMHG
LVOT MV: 0.64 CM/S
MV PEAK A VEL: 0.72 M/S
MV PEAK E VEL: 0.61 M/S
MV STENOSIS PRESSURE HALF TIME: 41.11 MS
MV VALVE AREA P 1/2 METHOD: 5.35 CM2
NUC STRESS DIASTOLIC VOLUME INDEX: 121
NUC STRESS EJECTION FRACTION: 47 %
NUC STRESS SYSTOLIC VOLUME INDEX: 64
OHS CV CPX 85 PERCENT MAX PREDICTED HEART RATE MALE: 144
OHS CV CPX MAX PREDICTED HEART RATE: 169
OHS CV CPX PATIENT IS FEMALE: 0
OHS CV CPX PATIENT IS MALE: 1
OHS CV CPX PEAK DIASTOLIC BLOOD PRESSURE: 67 MMHG
OHS CV CPX PEAK HEAR RATE: 108 BPM
OHS CV CPX PEAK RATE PRESSURE PRODUCT: NORMAL
OHS CV CPX PEAK SYSTOLIC BLOOD PRESSURE: 106 MMHG
OHS CV CPX PERCENT MAX PREDICTED HEART RATE ACHIEVED: 64
OHS CV CPX RATE PRESSURE PRODUCT PRESENTING: 9744
OHS CV INITIAL DOSE: 10.6 MCG/KG/MIN
OHS CV PEAK DOSE: 30.2 MCG/KG/MIN
OHS CV RV/LV RATIO: 0.73 CM
PV PEAK GRADIENT: 3 MMHG
PV PEAK VELOCITY: 0.9 M/S
RA MAJOR: 3.3 CM
RA PRESSURE ESTIMATED: 8 MMHG
RA WIDTH: 2.7 CM
RIGHT VENTRICLE DIASTOLIC BASEL DIMENSION: 3.5 CM
RIGHT VENTRICULAR END-DIASTOLIC DIMENSION: 3.5 CM
RV TISSUE DOPPLER FREE WALL SYSTOLIC VELOCITY 1 (APICAL 4 CHAMBER VIEW): 10.68 CM/S
SINUS: 3.35 CM
STJ: 3.05 CM
SYSTOLIC BLOOD PRESSURE: 116 MMHG
TDI LATERAL: 0.08 M/S
TDI SEPTAL: 0.04 M/S
TDI: 0.06 M/S
TRICUSPID ANNULAR PLANE SYSTOLIC EXCURSION: 2.01 CM

## 2024-11-25 PROCEDURE — 93306 TTE W/DOPPLER COMPLETE: CPT

## 2024-11-25 PROCEDURE — 93018 CV STRESS TEST I&R ONLY: CPT | Mod: ,,, | Performed by: INTERNAL MEDICINE

## 2024-11-25 PROCEDURE — 63600175 PHARM REV CODE 636 W HCPCS: Performed by: INTERNAL MEDICINE

## 2024-11-25 PROCEDURE — 93017 CV STRESS TEST TRACING ONLY: CPT

## 2024-11-25 PROCEDURE — 93016 CV STRESS TEST SUPVJ ONLY: CPT | Mod: ,,, | Performed by: INTERNAL MEDICINE

## 2024-11-25 PROCEDURE — 78452 HT MUSCLE IMAGE SPECT MULT: CPT

## 2024-11-25 PROCEDURE — A9502 TC99M TETROFOSMIN: HCPCS | Performed by: INTERNAL MEDICINE

## 2024-11-25 PROCEDURE — 78452 HT MUSCLE IMAGE SPECT MULT: CPT | Mod: 26,,, | Performed by: INTERNAL MEDICINE

## 2024-11-25 PROCEDURE — 93306 TTE W/DOPPLER COMPLETE: CPT | Mod: 26,,, | Performed by: INTERNAL MEDICINE

## 2024-11-25 RX ORDER — REGADENOSON 0.08 MG/ML
0.4 INJECTION, SOLUTION INTRAVENOUS ONCE
Status: COMPLETED | OUTPATIENT
Start: 2024-11-25 | End: 2024-11-25

## 2024-11-25 RX ADMIN — TETROFOSMIN 10.6 MILLICURIE: 1.38 INJECTION, POWDER, LYOPHILIZED, FOR SOLUTION INTRAVENOUS at 07:11

## 2024-11-25 RX ADMIN — REGADENOSON 0.4 MG: 0.08 INJECTION, SOLUTION INTRAVENOUS at 08:11

## 2024-11-25 RX ADMIN — TETROFOSMIN 30.2 MILLICURIE: 1.38 INJECTION, POWDER, LYOPHILIZED, FOR SOLUTION INTRAVENOUS at 08:11

## 2025-05-28 ENCOUNTER — OFFICE VISIT (OUTPATIENT)
Dept: CARDIOLOGY | Facility: CLINIC | Age: 50
End: 2025-05-28
Payer: COMMERCIAL

## 2025-05-28 VITALS
HEIGHT: 68 IN | DIASTOLIC BLOOD PRESSURE: 78 MMHG | HEART RATE: 85 BPM | SYSTOLIC BLOOD PRESSURE: 100 MMHG | BODY MASS INDEX: 27.58 KG/M2 | WEIGHT: 182 LBS

## 2025-05-28 DIAGNOSIS — E78.00 PURE HYPERCHOLESTEROLEMIA: ICD-10-CM

## 2025-05-28 DIAGNOSIS — R07.89 CHEST PAIN, ATYPICAL: ICD-10-CM

## 2025-05-28 DIAGNOSIS — I25.10 CORONARY ARTERY DISEASE INVOLVING NATIVE CORONARY ARTERY OF NATIVE HEART WITHOUT ANGINA PECTORIS: Primary | ICD-10-CM

## 2025-05-28 DIAGNOSIS — I10 ESSENTIAL HYPERTENSION: ICD-10-CM

## 2025-05-28 PROCEDURE — 93000 ELECTROCARDIOGRAM COMPLETE: CPT | Mod: S$GLB,,, | Performed by: INTERNAL MEDICINE

## 2025-05-28 PROCEDURE — 3078F DIAST BP <80 MM HG: CPT | Mod: CPTII,S$GLB,, | Performed by: INTERNAL MEDICINE

## 2025-05-28 PROCEDURE — 3074F SYST BP LT 130 MM HG: CPT | Mod: CPTII,S$GLB,, | Performed by: INTERNAL MEDICINE

## 2025-05-28 PROCEDURE — 99214 OFFICE O/P EST MOD 30 MIN: CPT | Mod: S$GLB,,, | Performed by: INTERNAL MEDICINE

## 2025-05-28 PROCEDURE — 1159F MED LIST DOCD IN RCRD: CPT | Mod: CPTII,S$GLB,, | Performed by: INTERNAL MEDICINE

## 2025-05-28 PROCEDURE — 99999 PR PBB SHADOW E&M-EST. PATIENT-LVL IV: CPT | Mod: PBBFAC,,, | Performed by: INTERNAL MEDICINE

## 2025-05-28 PROCEDURE — 3008F BODY MASS INDEX DOCD: CPT | Mod: CPTII,S$GLB,, | Performed by: INTERNAL MEDICINE

## 2025-05-28 NOTE — PROGRESS NOTES
Subjective   Patient ID:  Kathy Vinson is a 49 y.o. male who presents for follow-up of No chief complaint on file.      HPI      CAD - NSTEMI RYLIE to LAD and RCA -  Cx  6/22/18, HTN, DM, HLD, LE arterial embolism - s/p 6 months of eliquis        Referred by Dr Patten  Mr Vinson is a 41 yo man with hypertension, DLPD, who was recently admitted for NSTEMI and s/p LAD PCI with 3.5  X 18 mm RYLIE and mid RCA PCI with 3.0 x 12 mm RYLIE and LCX/Om  who comes for clinical follow up.   Patient was supposed to have a stress test done today to evaluate underlying ischemia of the LCX/Om territory. He also had vascular US given luminal irregularities seen on angiogram.   Today he reports to be doing well and denies anginal symptoms.      Coronary artery disease involving native coronary artery of native heart  -Patient is totally asymptomatic at the moment. He is able to perform all his daily activities without any angina, palpitations or diaphoresis.   -Would continue with medical therapy for CAD and repeat 2D echo with CFD  -PET stress showed ischemia of the LCX/OM territory therefore we will do another angiogram with plan PCI  -LHC and coronary angiogram via R CFA  -IVF at 3 ml/hour  -Patient is a RYLIE candidate  -The risks, benefits & alternatives of the procedure were explained to the patient.    -The risks of coronary angiography include but are not limited to:  Bleeding, infection, heart rhythm abnormalities, allergic reactions, kidney injury, stroke and death.    -Should stenting be indicated, the patient has agreed to dual anti-platelet therapy for 1-consecutive year with a drug-eluting stent and a minimum of 1-month with the use of a bare metal stent.    -The risks of moderate sedation include hypotension, respiratory depression, arrhythmias, bronchospasm, & death.    -Informed consent was obtained & the patient is agreeable to proceed with the procedure.  -This patient was discussed with the attending interventional  cardiologist who agrees with the above assessment & plan.       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 There was an unsuccessful attempt at Cx .  Patent RCA and LAD stents.  Patient tolerated well the procedure and there were no complications.   He remained stable and was safely discharged home.      Stress test 11/25/24    Abnormal myocardial perfusion scan.    There is amoderate intensity, medium sized, mostly fixed perfusion abnormality with some reversibilty in the inferolateral wall(s).    There are no other significant perfusion abnormalities.    The gated perfusion images showed an ejection fraction of 47% post stress.    There is mild regional hypokinesis during post-stress in the inferolateral wall(s).  Similar to stress test 10/6/21 and 3/9/23     Echo 11/25/24    Left Ventricle: The left ventricle is normal in size. Mildly increased wall thickness. There is mild concentric hypertrophy. There is normal systolic function with a visually estimated ejection fraction of 55 - 60%. Grade I diastolic dysfunction.    Right Ventricle: Normal right ventricular cavity size. Systolic function is norm     FRANCISCO 8/2/19  Normal FRANCISCO bilaterally.  Normal PVR waveforms bilaterally.      12/3/18 Has been on eliquis for 6 months  Denies CP or SOB  Did not do cardiac rehab  EKG NSR PRWP  Stop eliquis since he has been treated for 6 months due to arterial embolus  Cardiac stable  Declines cardiac rehab     3/14/19 Gets occasional CP improved with NTG  C/O ED  EKG NSR PRWP - similar to previous   Rx for viagra - instructed not to take with NTG  OV 6 months with echo and  exercise myoview     7/22/19 EKG NSR PRWP  Denies significant CP or SOB  Reports some numbness and tingling on occasion in his RLE     2/11/20 Denies significant CP or SOB  Going to the gym regularly     8/27/20 Denies CP or SOB  Goes to the gym regularly  EKG NSR PRWP   BP controlled on current Rx  Denies angina  Requesting refill of ED medication  OV 6 months with echo     9/9/21 Staying active. Denies recent CP or SOB  EKG NSR old anterior MI   OV 1 month with echo and exercise myoview for CAD, CP  Continue Rx for CAD, HTN, DM, DM     5/13/22 Denies CP or SOB  BP controlled  EKG NSR minor NSSTT changes  Continue Rx for CAD, HTN, DM, HLD  OV 6 months     2/9/23 Denies CP or SOB. Some upper abdominal discomfort  EKG NSR NSSTT changes  BP controlled  Needs clearance for colonoscopy  Echo and lexiscan myoview for CAD - if stable will clear for colonoscopy    Continue Rx for CAD, HTN, DM, HLD     3/14/23 Denies CP or SOB. Some numbness in his fingertips  BP controlled  Stable echo and stress findings  Cleared for colonoscopy and ENT surgery at low cardiac risk - ok to hold plavix 5 days before    Continue Rx for CAD, HTN, DM, HLD  OV 6 months      5/2/24 Denies recent CP or SOB  BP controlled  EKG NSR LVH    Continue Rx for CAD, HTN, DM, HLD  OV 6 months with echo and lexiscan myoview     5/28/25 Denies CP or SOB  BP controlled  EKG NSR LVH    Review of Systems   Constitutional: Negative for decreased appetite.   HENT:  Negative for ear discharge.    Eyes:  Negative for blurred vision.   Endocrine: Negative for polyphagia.   Skin:  Negative for nail changes.   Genitourinary:  Negative for bladder incontinence.   Neurological:  Negative for aphonia.   Psychiatric/Behavioral:  Negative for hallucinations.    Allergic/Immunologic: Negative for hives.          Objective     Physical Exam  Constitutional:       Appearance: He is well-developed.   HENT:      Head: Normocephalic and atraumatic.   Eyes:       Conjunctiva/sclera: Conjunctivae normal.      Pupils: Pupils are equal, round, and reactive to light.   Cardiovascular:      Rate and Rhythm: Normal rate.      Pulses: Intact distal pulses.      Heart sounds: Normal heart sounds.   Pulmonary:      Effort: Pulmonary effort is normal.      Breath sounds: Normal breath sounds.   Abdominal:      General: Bowel sounds are normal.      Palpations: Abdomen is soft.   Musculoskeletal:         General: Normal range of motion.      Cervical back: Normal range of motion and neck supple.   Skin:     General: Skin is warm and dry.   Neurological:      Mental Status: He is alert and oriented to person, place, and time.            Assessment and Plan     1. Coronary artery disease involving native coronary artery of native heart without angina pectoris    2. Essential hypertension    3. Pure hypercholesterolemia    4. Chest pain, atypical        Plan:    Echo and stress test stable     Continue Rx for CAD, HTN, DM, HLD  OV 6 months    Advance Care Planning     Date: 05/28/2025  Patient did not wish or was not able to name a surrogate decision maker or provide an Advance Care Plan.

## 2025-05-29 LAB
OHS QRS DURATION: 88 MS
OHS QTC CALCULATION: 423 MS